# Patient Record
Sex: MALE | Race: WHITE | NOT HISPANIC OR LATINO | Employment: OTHER | URBAN - METROPOLITAN AREA
[De-identification: names, ages, dates, MRNs, and addresses within clinical notes are randomized per-mention and may not be internally consistent; named-entity substitution may affect disease eponyms.]

---

## 2017-08-09 ENCOUNTER — ALLSCRIPTS OFFICE VISIT (OUTPATIENT)
Dept: OTHER | Facility: OTHER | Age: 82
End: 2017-08-09

## 2017-08-09 LAB
BILIRUB UR QL STRIP: NEGATIVE
CLARITY UR: NORMAL
COLOR UR: YELLOW
GLUCOSE (HISTORICAL): NEGATIVE
HGB UR QL STRIP.AUTO: NORMAL
KETONES UR STRIP-MCNC: NEGATIVE MG/DL
LEUKOCYTE ESTERASE UR QL STRIP: NORMAL
NITRITE UR QL STRIP: NEGATIVE
PH UR STRIP.AUTO: 7 [PH]
PROT UR STRIP-MCNC: > = 300 MG/DL
SP GR UR STRIP.AUTO: 1.02
UROBILINOGEN UR QL STRIP.AUTO: 0.2

## 2018-01-12 VITALS
HEIGHT: 68 IN | BODY MASS INDEX: 24.86 KG/M2 | SYSTOLIC BLOOD PRESSURE: 144 MMHG | DIASTOLIC BLOOD PRESSURE: 68 MMHG | WEIGHT: 164 LBS

## 2018-05-24 ENCOUNTER — TELEPHONE (OUTPATIENT)
Dept: UROLOGY | Facility: MEDICAL CENTER | Age: 83
End: 2018-05-24

## 2018-05-24 NOTE — TELEPHONE ENCOUNTER
An Auto-fax Refill Request for Cephalexin was received from [facility] mail order pharmacy  Patient was last seen in August, 2017 by Dr Cheri Caputo  At that time, prolonged antibiotic treatment was only expected for several months, not a long-term therapy  Continuation of the medication is NOT APPROVED  [facility] notified via phone; I spoke with Aristeo Alva  Ph

## 2018-08-13 ENCOUNTER — TELEPHONE (OUTPATIENT)
Dept: UROLOGY | Facility: MEDICAL CENTER | Age: 83
End: 2018-08-13

## 2018-08-13 ENCOUNTER — OFFICE VISIT (OUTPATIENT)
Dept: UROLOGY | Facility: MEDICAL CENTER | Age: 83
End: 2018-08-13
Payer: COMMERCIAL

## 2018-08-13 VITALS
DIASTOLIC BLOOD PRESSURE: 78 MMHG | BODY MASS INDEX: 26.2 KG/M2 | HEIGHT: 66 IN | WEIGHT: 163 LBS | SYSTOLIC BLOOD PRESSURE: 126 MMHG

## 2018-08-13 DIAGNOSIS — N52.9 ERECTILE DYSFUNCTION, UNSPECIFIED ERECTILE DYSFUNCTION TYPE: ICD-10-CM

## 2018-08-13 DIAGNOSIS — N39.41 URGE INCONTINENCE OF URINE: Primary | ICD-10-CM

## 2018-08-13 DIAGNOSIS — R35.1 BENIGN PROSTATIC HYPERPLASIA WITH NOCTURIA: ICD-10-CM

## 2018-08-13 DIAGNOSIS — N40.1 BENIGN PROSTATIC HYPERPLASIA WITH NOCTURIA: ICD-10-CM

## 2018-08-13 LAB
SL AMB  POCT GLUCOSE, UA: NEGATIVE
SL AMB LEUKOCYTE ESTERASE,UA: ABNORMAL
SL AMB POCT BILIRUBIN,UA: ABNORMAL
SL AMB POCT BLOOD,UA: ABNORMAL
SL AMB POCT CLARITY,UA: ABNORMAL
SL AMB POCT COLOR,UA: YELLOW
SL AMB POCT KETONES,UA: ABNORMAL
SL AMB POCT NITRITE,UA: NEGATIVE
SL AMB POCT PH,UA: 7
SL AMB POCT SPECIFIC GRAVITY,UA: 1.02
SL AMB POCT URINE PROTEIN: ABNORMAL
SL AMB POCT UROBILINOGEN: 0.2

## 2018-08-13 PROCEDURE — 4040F PNEUMOC VAC/ADMIN/RCVD: CPT | Performed by: UROLOGY

## 2018-08-13 PROCEDURE — 99214 OFFICE O/P EST MOD 30 MIN: CPT | Performed by: UROLOGY

## 2018-08-13 PROCEDURE — 81003 URINALYSIS AUTO W/O SCOPE: CPT | Performed by: UROLOGY

## 2018-08-13 RX ORDER — DULOXETIN HYDROCHLORIDE 60 MG/1
60 CAPSULE, DELAYED RELEASE ORAL DAILY
COMMUNITY
End: 2021-01-01 | Stop reason: ALTCHOICE

## 2018-08-13 RX ORDER — NIACIN 500 MG
TABLET ORAL DAILY
COMMUNITY

## 2018-08-13 RX ORDER — TRAMADOL HYDROCHLORIDE 50 MG/1
TABLET ORAL AS NEEDED
COMMUNITY
Start: 2018-02-27 | End: 2019-08-21

## 2018-08-13 RX ORDER — CALCIUM CARBONATE/VITAMIN D3 500-10/5ML
LIQUID (ML) ORAL DAILY
COMMUNITY
Start: 2014-07-10

## 2018-08-13 RX ORDER — METOPROLOL TARTRATE 50 MG/1
TABLET, FILM COATED ORAL 2 TIMES DAILY
COMMUNITY
Start: 2018-06-13

## 2018-08-13 RX ORDER — CITALOPRAM 40 MG/1
TABLET ORAL DAILY
COMMUNITY
Start: 2018-07-17 | End: 2021-01-01 | Stop reason: ALTCHOICE

## 2018-08-13 RX ORDER — PRAVASTATIN SODIUM 80 MG/1
TABLET ORAL DAILY
COMMUNITY
Start: 2018-07-20

## 2018-08-13 RX ORDER — SILDENAFIL 100 MG/1
100 TABLET, FILM COATED ORAL DAILY PRN
Qty: 3 TABLET | Refills: 0 | Status: SHIPPED | OUTPATIENT
Start: 2018-08-13 | End: 2021-01-01 | Stop reason: ALTCHOICE

## 2018-08-13 RX ORDER — ASPIRIN 325 MG
325 TABLET ORAL DAILY
COMMUNITY
Start: 2008-06-11

## 2018-08-13 RX ORDER — GABAPENTIN 100 MG/1
100 CAPSULE ORAL AS NEEDED
COMMUNITY
Start: 2018-03-05 | End: 2019-08-21

## 2018-08-13 RX ORDER — CEPHALEXIN 500 MG/1
1 CAPSULE ORAL DAILY
COMMUNITY
Start: 2017-08-09 | End: 2018-08-15 | Stop reason: SDUPTHER

## 2018-08-13 RX ORDER — LOSARTAN POTASSIUM 100 MG/1
TABLET ORAL DAILY
COMMUNITY
Start: 2018-07-17

## 2018-08-13 NOTE — PATIENT INSTRUCTIONS
Erectile Dysfunction   WHAT YOU NEED TO KNOW:   What is erectile dysfunction? Erectile dysfunction (ED), or impotence, is when you cannot get or keep an erection for sexual activity  What causes ED? · Conditions that lead to nerve problems, such as a spinal cord injury, diabetes, or stroke    · Hormone imbalances, such as low testosterone, high prolactin, or a thyroid disorder    · Medical conditions that decrease blood flow, such as high blood pressure and arteriosclerosis    · Multiple sclerosis or Parkinson disease    · Medicines, such as those used to treat depression and high blood pressure    · Injury to the testicles from radiation therapy to the testicles  What increases my risk for ED? · Obesity    · Diabetes that is not controlled    · Smoking, or drug or alcohol abuse    · An enlarged prostate    · Increasing age    · Spine or groin surgeries    · Stress, depression, relationship problems, and anxiety  How is ED diagnosed? Your healthcare provider will ask questions about your symptoms  He will also ask about any medical conditions you have and medicines you take  Your healthcare provider will examine your abdomen, penis, and testicles  A rectal exam may also be done to check for an enlarged prostate  Blood and urine tests are done to check for medical conditions that may have caused your ED  You may also need tests to check your blood flow and nerve function  How is ED treated? Treatment depends on the cause of your ED  You may need any of the following:  · ED medicines  help you have an erection  These medicines are taken before you have sex  Follow your healthcare provider's instructions on how to use these medicines  You may have a life-threatening reaction if you mix ED medicines with medicines that contain nitrates  Medicines with nitrates include nitroglycerin and other heart medicines  · Testosterone  may be given to increase the levels in your blood and improve your ED   You may need to use a skin cream or wear a patch  · Penis injections  may be done to help improve your blood flow  · A vacuum device  is a tube that is placed over the penis  A hand pump is connected to the tube and acts as a vacuum  This may help increase blood flow to the penis  · Therapy  may be needed to treat emotional or relationship problems that may be causing your ED  · Surgery  may be recommended if other treatments do not work  Surgery includes a penile implant or prosthesis  Surgery may also be done to improve blood flow  Ask for more information about surgeries for ED  How can I decrease my risk for ED? · Do not smoke  Smoking can cause ED  Nicotine and other chemicals in cigarettes and cigars can also cause lung damage  Ask your healthcare provider for information if you currently smoke and need help to quit  E-cigarettes or smokeless tobacco still contain nicotine  Talk to your healthcare provider before you use these products  · Control your blood sugar levels if you have diabetes  Over time, high blood sugar levels can cause ED  · Limit alcohol  Men should limit alcohol to 2 drinks a day  A drink of alcohol is 12 ounces of beer, 5 ounces of wine, or 1½ ounces of liquor  · Do not use illegal drugs  They increase your risk of ED  · Manage your medical conditions  Eat a variety of healthy foods and stay physically active  Take your medicines as directed  They can help control conditions that may cause ED  · Manage stress  Learn ways to relax, such as deep breathing, meditation, and listening to music  When should I seek immediate care? · You have chest pain, dizziness, or nausea after you take ED medicines or during or after sex  · You have an erection for more than 4 hours after you take your ED medicine  · You see blood in your urine  When should I contact my healthcare provider?    · You have changes in your vision, headaches, or back pain after you take your ED medicine  · You have a painful erection  · You have questions or concerns about your condition or care  CARE AGREEMENT:   You have the right to help plan your care  Learn about your health condition and how it may be treated  Discuss treatment options with your caregivers to decide what care you want to receive  You always have the right to refuse treatment  The above information is an  only  It is not intended as medical advice for individual conditions or treatments  Talk to your doctor, nurse or pharmacist before following any medical regimen to see if it is safe and effective for you  © 2017 [facility] Information is for End User's use only and may not be sold, redistributed or otherwise used for commercial purposes  All illustrations and images included in CareNotes® are the copyrighted property of A D A M , Inc  or Vance Borja

## 2018-08-13 NOTE — PROGRESS NOTES
Assessment/Plan:    Erectile dysfunction   Trial of sildenafil in progress    Benign prostatic hyperplasia with nocturia   The patient is satisfied with the status quo  Urge incontinence of urine    Seldom occurs unless he is infected  Continue Keflex at bedtime  Diagnoses and all orders for this visit:    Urge incontinence of urine  -     POCT urine dip auto non-scope    Benign prostatic hyperplasia with nocturia    Erectile dysfunction, unspecified erectile dysfunction type  -     sildenafil (VIAGRA) 100 mg tablet; Take 1 tablet (100 mg total) by mouth daily as needed for erectile dysfunction    Other orders  -     cephalexin (KEFLEX) 500 mg capsule; Take 1 capsule by mouth daily  -     aspirin 325 mg tablet; Take 325 mg by mouth daily  -     citalopram (CeleXA) 40 mg tablet; daily  -     Omega-3 Fatty Acids (FISH OIL PO); daily  -     DULoxetine (CYMBALTA) 60 mg delayed release capsule; Take 60 mg by mouth daily  -     gabapentin (NEURONTIN) 100 mg capsule; Take 100 mg by mouth daily  -     losartan (COZAAR) 100 MG tablet; daily  -     Magnesium Oxide 400 MG CAPS; daily  -     metoprolol tartrate (LOPRESSOR) 50 mg tablet; 2 (two) times a day  -     pravastatin (PRAVACHOL) 80 mg tablet; daily  -     traMADol (ULTRAM) 50 mg tablet; as needed  -     niacin 500 mg tablet; daily          Subjective:      Patient ID: Stanislaw Zendejas is a 82 y o  male  HPI  BPH:   He notes urinary urgency, nocturia x 2  Able to fal back to sleep  Rosella Goldmann He denies other significant urinary symptoms  He denies gross hematuria, urinary tract infections or incontinence  He is taking neither medications nor supplements for his symptoms  Urge incontinence:    Abx have decreased leakage to a rare event and volumes are small  Does not wear a pad  Pt has been taking Keflex q h s  for a year     The patient is prone to urinary tract infections because of his post -void residual of approximately 150 mL as measured at the time of his cystoscopy 2 years ago  Keflex prescription will be renewed  Erectile dysfunction:  Tried Cialis (dose unknown) and Viagra 50 in the past and neither worked  Will try Viagra 100 mg  The following portions of the patient's history were reviewed and updated as appropriate: allergies, current medications, past family history, past medical history, past social history, past surgical history and problem list     Review of Systems   Constitutional: Negative for activity change and fatigue  Respiratory: Negative for shortness of breath and wheezing  Cardiovascular: Negative for chest pain  Gastrointestinal: Negative for abdominal pain  Genitourinary: Negative for difficulty urinating, dysuria, frequency, hematuria and urgency  Musculoskeletal: Negative for back pain and gait problem  Skin: Negative  Allergic/Immunologic: Negative  Neurological: Negative  Psychiatric/Behavioral: Negative  Objective:      /78 (BP Location: Left arm, Patient Position: Sitting, Cuff Size: Standard)   Ht 5' 6" (1 676 m)   Wt 73 9 kg (163 lb)   BMI 26 31 kg/m²          Physical Exam   Constitutional: He is oriented to person, place, and time  He appears well-developed and well-nourished  HENT:   Head: Normocephalic and atraumatic  Eyes: EOM are normal    Neck: Normal range of motion  Neck supple  Pulmonary/Chest: Effort normal    Genitourinary: Rectum normal    Genitourinary Comments: The prostate is 30 gm, firm, smooth, non-tender  Musculoskeletal: Normal range of motion  Neurological: He is alert and oriented to person, place, and time  Skin: Skin is warm and dry  Psychiatric: He has a normal mood and affect   His behavior is normal  Judgment and thought content normal

## 2018-08-13 NOTE — PROGRESS NOTES
IPSS Questionnaire (AUA-7): Over the past month    1)  How often have you had a sensation of not emptying your bladder completely after you finish urinating? 0 - Not at all   2)  How often have you had to urinate again less than two hours after you finished urinating? 1 - Less than 1 time in 5   3)  How often have you found you stopped and started again several times when you urinated? 0 - Not at all   4) How difficult have you found it to postpone urination? 2 - Less than half the time   5) How often have you had a weak urinary stream?  1 - Less than 1 time in 5   6) How often have you had to push or strain to begin urination? 0 - Not at all   7) How many times did you most typically get up to urinate from the time you went to bed until the time you got up in the morning? 2 - 2 times   Total Score:  6     QOL: Mostly satisfied

## 2018-08-13 NOTE — TELEPHONE ENCOUNTER
Patient left a message on the Medication Refill voice mail line  He was seen by Dr Ruthy Fernandez today and wanted to clarify if he was to continue a prescription for a "flex-something" named drug (Cephalexin) through [facility]  Office note from today's visit was incomplete and did not mention continuation of the medication  However, this very topic was discussed by on 5/24/18 (see note)  At that time, long-term treatment was not felt to be indicated and the medication was discontinued at that time  Wife thought he was to continue the drug  Message forwarded to Dr Ruthy Fernandez

## 2018-08-13 NOTE — LETTER
August 14, 2018     Rosalinaandrew SantosDO  [address]    Patient: Terrance Pepe   YOB: 1935   Date of Visit: 8/13/2018       Dear Dr Goldberg Slider: Thank you for referring Alfa Ibarra to me for evaluation  Below are my notes for this consultation  If you have questions, please do not hesitate to call me  I look forward to following your patient along with you  Sincerely,        Roverto Lester MD        CC: No Recipients  Roverto Lester MD  8/14/2018  2:41 PM  Sign at close encounter  Assessment/Plan:    Erectile dysfunction   Trial of sildenafil in progress    Benign prostatic hyperplasia with nocturia   The patient is satisfied with the status quo  Urge incontinence of urine    Seldom occurs unless he is infected  Continue Keflex at bedtime  Diagnoses and all orders for this visit:    Urge incontinence of urine  -     POCT urine dip auto non-scope    Benign prostatic hyperplasia with nocturia    Erectile dysfunction, unspecified erectile dysfunction type  -     sildenafil (VIAGRA) 100 mg tablet; Take 1 tablet (100 mg total) by mouth daily as needed for erectile dysfunction    Other orders  -     cephalexin (KEFLEX) 500 mg capsule; Take 1 capsule by mouth daily  -     aspirin 325 mg tablet; Take 325 mg by mouth daily  -     citalopram (CeleXA) 40 mg tablet; daily  -     Omega-3 Fatty Acids (FISH OIL PO); daily  -     DULoxetine (CYMBALTA) 60 mg delayed release capsule; Take 60 mg by mouth daily  -     gabapentin (NEURONTIN) 100 mg capsule; Take 100 mg by mouth daily  -     losartan (COZAAR) 100 MG tablet; daily  -     Magnesium Oxide 400 MG CAPS; daily  -     metoprolol tartrate (LOPRESSOR) 50 mg tablet; 2 (two) times a day  -     pravastatin (PRAVACHOL) 80 mg tablet; daily  -     traMADol (ULTRAM) 50 mg tablet; as needed  -     niacin 500 mg tablet; daily          Subjective:      Patient ID: Terrance Pepe is a 82 y o  male     HPI  BPH:   He notes urinary urgency, nocturia x 2  Able to fal back to sleep  Blaise Lam He denies other significant urinary symptoms  He denies gross hematuria, urinary tract infections or incontinence  He is taking neither medications nor supplements for his symptoms  Urge incontinence:    Abx have decreased leakage to a rare event and volumes are small  Does not wear a pad  Pt has been taking Keflex q h s  for a year  The patient is prone to urinary tract infections because of his post -void residual of approximately 150 mL as measured at the time of his cystoscopy 2 years ago  Keflex prescription will be renewed  Erectile dysfunction:  Tried Cialis (dose unknown) and Viagra 50 in the past and neither worked  Will try Viagra 100 mg  The following portions of the patient's history were reviewed and updated as appropriate: allergies, current medications, past family history, past medical history, past social history, past surgical history and problem list     Review of Systems   Constitutional: Negative for activity change and fatigue  Respiratory: Negative for shortness of breath and wheezing  Cardiovascular: Negative for chest pain  Gastrointestinal: Negative for abdominal pain  Genitourinary: Negative for difficulty urinating, dysuria, frequency, hematuria and urgency  Musculoskeletal: Negative for back pain and gait problem  Skin: Negative  Allergic/Immunologic: Negative  Neurological: Negative  Psychiatric/Behavioral: Negative  Objective:      /78 (BP Location: Left arm, Patient Position: Sitting, Cuff Size: Standard)   Ht 5' 6" (1 676 m)   Wt 73 9 kg (163 lb)   BMI 26 31 kg/m²           Physical Exam   Constitutional: He is oriented to person, place, and time  He appears well-developed and well-nourished  HENT:   Head: Normocephalic and atraumatic  Eyes: EOM are normal    Neck: Normal range of motion  Neck supple     Pulmonary/Chest: Effort normal    Genitourinary: Rectum normal    Genitourinary Comments: The prostate is 30 gm, firm, smooth, non-tender  Musculoskeletal: Normal range of motion  Neurological: He is alert and oriented to person, place, and time  Skin: Skin is warm and dry  Psychiatric: He has a normal mood and affect   His behavior is normal  Judgment and thought content normal

## 2018-08-14 PROBLEM — R35.1 BENIGN PROSTATIC HYPERPLASIA WITH NOCTURIA: Status: ACTIVE | Noted: 2018-08-14

## 2018-08-14 PROBLEM — N40.1 BENIGN PROSTATIC HYPERPLASIA WITH NOCTURIA: Status: ACTIVE | Noted: 2018-08-14

## 2018-08-14 PROBLEM — N39.41 URGE INCONTINENCE OF URINE: Status: ACTIVE | Noted: 2018-08-14

## 2018-08-14 PROBLEM — N52.9 ERECTILE DYSFUNCTION: Status: ACTIVE | Noted: 2018-08-14

## 2018-08-15 DIAGNOSIS — N39.0 RECURRENT UTI: ICD-10-CM

## 2018-08-15 DIAGNOSIS — N39.41 URGE INCONTINENCE: Primary | ICD-10-CM

## 2018-08-15 RX ORDER — CEPHALEXIN 500 MG/1
500 CAPSULE ORAL
Qty: 90 CAPSULE | Refills: 3 | Status: SHIPPED | OUTPATIENT
Start: 2018-08-15 | End: 2019-06-20 | Stop reason: SDUPTHER

## 2018-08-15 NOTE — TELEPHONE ENCOUNTER
Per office note dated 8/13/18, YES patient is to continue with QHS dosing of Cephalexin (Keflex) 500mg    Script for same, 90 day supply with 3 refills was queued and forwarded to Dr Faiza Gordillo for approval

## 2018-08-15 NOTE — TELEPHONE ENCOUNTER
Per office note dated 8/13/18 states patient to continue with QHS dosing of Cephalexin (Keflex) 500mg    Script for same, 90 day supply with 3 refills was queued and forwarded to Dr Hayde Simon for approval

## 2019-03-20 ENCOUNTER — TELEPHONE (OUTPATIENT)
Dept: UROLOGY | Facility: MEDICAL CENTER | Age: 84
End: 2019-03-20

## 2019-03-20 NOTE — TELEPHONE ENCOUNTER
Pt called asking to have his urine results in Care Everywhere reviewed states his protein level is elevated

## 2019-03-22 NOTE — TELEPHONE ENCOUNTER
Patient is to follow up with the provider that ordered the testing which would be his internal medicine physician    At that time he will be able to interpret the results with the patient and explain it is meaning to the patient

## 2019-06-20 DIAGNOSIS — N39.41 URGE INCONTINENCE: ICD-10-CM

## 2019-06-22 RX ORDER — CEPHALEXIN 500 MG/1
CAPSULE ORAL
Qty: 90 CAPSULE | Refills: 3 | Status: SHIPPED | OUTPATIENT
Start: 2019-06-22 | End: 2019-09-20

## 2019-08-21 ENCOUNTER — OFFICE VISIT (OUTPATIENT)
Dept: UROLOGY | Facility: MEDICAL CENTER | Age: 84
End: 2019-08-21
Payer: COMMERCIAL

## 2019-08-21 VITALS
SYSTOLIC BLOOD PRESSURE: 138 MMHG | WEIGHT: 162 LBS | BODY MASS INDEX: 24.55 KG/M2 | DIASTOLIC BLOOD PRESSURE: 84 MMHG | HEART RATE: 62 BPM | HEIGHT: 68 IN

## 2019-08-21 DIAGNOSIS — N40.1 BENIGN PROSTATIC HYPERPLASIA WITH NOCTURIA: Primary | ICD-10-CM

## 2019-08-21 DIAGNOSIS — N52.9 ERECTILE DYSFUNCTION, UNSPECIFIED ERECTILE DYSFUNCTION TYPE: ICD-10-CM

## 2019-08-21 DIAGNOSIS — N39.0 RECURRENT URINARY TRACT INFECTION: ICD-10-CM

## 2019-08-21 DIAGNOSIS — R35.1 BENIGN PROSTATIC HYPERPLASIA WITH NOCTURIA: Primary | ICD-10-CM

## 2019-08-21 LAB
SL AMB  POCT GLUCOSE, UA: NEGATIVE
SL AMB LEUKOCYTE ESTERASE,UA: ABNORMAL
SL AMB POCT BILIRUBIN,UA: NEGATIVE
SL AMB POCT BLOOD,UA: NEGATIVE
SL AMB POCT CLARITY,UA: ABNORMAL
SL AMB POCT COLOR,UA: YELLOW
SL AMB POCT KETONES,UA: NEGATIVE
SL AMB POCT NITRITE,UA: NEGATIVE
SL AMB POCT PH,UA: 8
SL AMB POCT SPECIFIC GRAVITY,UA: 1.02
SL AMB POCT URINE PROTEIN: ABNORMAL
SL AMB POCT UROBILINOGEN: 0.2

## 2019-08-21 PROCEDURE — 99214 OFFICE O/P EST MOD 30 MIN: CPT | Performed by: UROLOGY

## 2019-08-21 PROCEDURE — 81003 URINALYSIS AUTO W/O SCOPE: CPT | Performed by: UROLOGY

## 2019-08-21 RX ORDER — TADALAFIL 20 MG/1
20 TABLET ORAL AS NEEDED
Qty: 10 TABLET | Refills: 1 | Status: SHIPPED | OUTPATIENT
Start: 2019-08-21 | End: 2021-01-01 | Stop reason: ALTCHOICE

## 2019-08-21 RX ORDER — METHOCARBAMOL 750 MG/1
TABLET, FILM COATED ORAL
Refills: 0 | COMMUNITY
Start: 2019-08-08 | End: 2020-03-12

## 2019-08-21 RX ORDER — IBUPROFEN 400 MG/1
TABLET ORAL AS NEEDED
Refills: 0 | COMMUNITY
Start: 2019-08-08 | End: 2020-03-12

## 2019-08-21 NOTE — PROGRESS NOTES
Assessment/Plan:    Benign prostatic hyperplasia with nocturia  The patient is content with the status quo  Return in 1 year  Erectile dysfunction  Sildenafil did not work  Trial of tadalafil  Recurrent urinary tract infection  Continue Keflex at bedtime  Diagnoses and all orders for this visit:    Benign prostatic hyperplasia with nocturia  -     POCT urine dip auto non-scope    Erectile dysfunction, unspecified erectile dysfunction type  -     tadalafil (CIALIS) 20 MG tablet; Take 1 tablet (20 mg total) by mouth as needed for erectile dysfunction for up to 1010 doses    Recurrent urinary tract infection    Other orders  -     ibuprofen (MOTRIN) 400 mg tablet; as needed  -     methocarbamol (ROBAXIN) 750 mg tablet; take 1 tablets by mouth three times a day if needed FOR MUSCLE SPASMS          Subjective:      Patient ID: Michaela Calabrese is a 82 y o  male  HPI   Recurrent UTI:  Cephalexin at bedtime has kept him from getting infected  BPH:  He notes urinary urgency and weak stream   He denies other significant urinary symptoms  He denies gross hematuria, urinary tract infections or incontinence  He is taking neither medications nor supplements for his symptoms  PSA:  No longer monitored due to the patient's age  AUA SYMPTOM SCORE      Most Recent Value   AUA SYMPTOM SCORE   How often have you had a sensation of not emptying your bladder completely after you finished urinating? 1   How often have you had to urinate again less than two hours after you finished urinating? 1   How often have you found you stopped and started again several times when you urinate? 1   How often have you found it difficult to postpone urination? 3   How often have you had a weak urinary stream?  2   How often have you had to push or strain to begin urination? 0   How many times did you most typically get up to urinate from the time you went to bed at night until the time you got up in the morning?   1 Quality of Life: If you were to spend the rest of your life with your urinary condition just the way it is now, how would you feel about that?  3   AUA SYMPTOM SCORE  9        Erectile dysfunction:  Sildenafil 100 did not work  Will try tadalafil 20 mg  The following portions of the patient's history were reviewed and updated as appropriate: allergies, current medications, past family history, past medical history, past social history, past surgical history and problem list     Review of Systems   Constitutional: Negative for activity change and fatigue  Respiratory: Negative for shortness of breath and wheezing  Cardiovascular: Negative for chest pain  Hypertension  Gastrointestinal: Negative for abdominal pain  Genitourinary: Negative for difficulty urinating, dysuria, frequency, hematuria and urgency  Musculoskeletal: Negative for back pain and gait problem  Skin: Negative  Allergic/Immunologic: Negative  Neurological: Negative  Psychiatric/Behavioral: Negative  Taking duloxetine  Objective:      /84 (BP Location: Right arm, Patient Position: Sitting, Cuff Size: Standard)   Pulse 62   Ht 5' 8" (1 727 m)   Wt 73 5 kg (162 lb)   BMI 24 63 kg/m²          Physical Exam   Constitutional: He is oriented to person, place, and time  He appears well-developed and well-nourished  HENT:   Head: Normocephalic and atraumatic  Eyes: EOM are normal    Neck: Normal range of motion  Neck supple  Pulmonary/Chest: Effort normal    Genitourinary: Rectum normal    Genitourinary Comments: The prostate is 20 gm, firm, smooth, non-tender  Musculoskeletal: Normal range of motion  Neurological: He is alert and oriented to person, place, and time  Skin: Skin is warm and dry  Psychiatric: He has a normal mood and affect   His behavior is normal  Judgment and thought content normal

## 2019-08-22 ENCOUNTER — TELEPHONE (OUTPATIENT)
Dept: UROLOGY | Facility: MEDICAL CENTER | Age: 84
End: 2019-08-22

## 2019-08-22 NOTE — TELEPHONE ENCOUNTER
Patient of Dr Tatiana Leos seen in the [facility] office  Patient is requesting an after visit summary for his visit on 8/21/19 be mailed to his home address

## 2019-11-15 ENCOUNTER — TELEPHONE (OUTPATIENT)
Dept: NEUROLOGY | Facility: CLINIC | Age: 84
End: 2019-11-15

## 2019-11-15 NOTE — TELEPHONE ENCOUNTER
Best contact number for patient:[phone]    Emergency Contact name and number:    Referring provider:Rishi Guerra    Referring provider Telephone:________________    Primary Care Provider Name: Rudi Guerrero    Reason for Appointment/Dx: [place] of left lower extremity    Neurology Location patient would like to be seen:    Order received? Yes                                                Records Received? No    Have you ever seen another Neurologist? No    Insurance Information    Insurance Name:[employer] Medicare    ID/Policy #:    Secondary Insurance:    ID/Policy#: Workman's Comp/ Accident/ School  Information      Workman's Comp/Accident/School related?  No    If yes name of Insurance company:    Date of Injury:    Open Claim:No    [address] Name and Telephone Number:    Notes:Piedad Medina new pt pack sent                   Appointment date:05/04/20 3:00pm _____________________________

## 2019-12-18 ENCOUNTER — TELEPHONE (OUTPATIENT)
Dept: NEUROLOGY | Facility: CLINIC | Age: 84
End: 2019-12-18

## 2019-12-18 NOTE — TELEPHONE ENCOUNTER
LMOM letting patient know we are trying to schedule patient an appointment for him to come in and see Dr Guerita Ortiz had asked if it was possible for them to get an appointment  We have appointments available in April but once we schedule an appointment if their is any cancellations we will give them a call for a sooner appointment  Let them know to call and ask for Maximus Dumas

## 2019-12-19 NOTE — TELEPHONE ENCOUNTER
Patient's wife returning call  Please call patient back  She said she is going to be there until 10am, and after that she will be out

## 2020-01-01 ENCOUNTER — TELEPHONE (OUTPATIENT)
Dept: PAIN MEDICINE | Facility: CLINIC | Age: 85
End: 2020-01-01

## 2020-01-01 ENCOUNTER — TELEPHONE (OUTPATIENT)
Dept: UROLOGY | Facility: AMBULATORY SURGERY CENTER | Age: 85
End: 2020-01-01

## 2020-01-01 ENCOUNTER — TELEPHONE (OUTPATIENT)
Dept: NEUROSURGERY | Facility: CLINIC | Age: 85
End: 2020-01-01

## 2020-01-01 ENCOUNTER — TELEMEDICINE (OUTPATIENT)
Dept: PAIN MEDICINE | Facility: CLINIC | Age: 85
End: 2020-01-01
Payer: COMMERCIAL

## 2020-01-01 ENCOUNTER — TELEPHONE (OUTPATIENT)
Dept: UROLOGY | Facility: MEDICAL CENTER | Age: 85
End: 2020-01-01

## 2020-01-01 ENCOUNTER — OFFICE VISIT (OUTPATIENT)
Dept: NEUROSURGERY | Facility: CLINIC | Age: 85
End: 2020-01-01
Payer: COMMERCIAL

## 2020-01-01 VITALS
HEIGHT: 68 IN | SYSTOLIC BLOOD PRESSURE: 143 MMHG | TEMPERATURE: 97.1 F | BODY MASS INDEX: 23.64 KG/M2 | HEART RATE: 61 BPM | RESPIRATION RATE: 16 BRPM | WEIGHT: 156 LBS | DIASTOLIC BLOOD PRESSURE: 72 MMHG

## 2020-01-01 DIAGNOSIS — G89.29 CHRONIC BILATERAL LOW BACK PAIN WITHOUT SCIATICA: ICD-10-CM

## 2020-01-01 DIAGNOSIS — M48.062 SPINAL STENOSIS OF LUMBAR REGION WITH NEUROGENIC CLAUDICATION: ICD-10-CM

## 2020-01-01 DIAGNOSIS — M51.26 HERNIATED NUCLEUS PULPOSUS, L4-5: ICD-10-CM

## 2020-01-01 DIAGNOSIS — M96.1 LUMBAR POST-LAMINECTOMY SYNDROME: ICD-10-CM

## 2020-01-01 DIAGNOSIS — R26.1 SPASTIC GAIT: ICD-10-CM

## 2020-01-01 DIAGNOSIS — M54.50 CHRONIC BILATERAL LOW BACK PAIN WITHOUT SCIATICA: ICD-10-CM

## 2020-01-01 DIAGNOSIS — G89.4 CHRONIC PAIN SYNDROME: Primary | ICD-10-CM

## 2020-01-01 DIAGNOSIS — M48.02 CERVICAL SPINAL STENOSIS: Primary | ICD-10-CM

## 2020-01-01 DIAGNOSIS — N39.0 URINARY TRACT INFECTION WITHOUT HEMATURIA, SITE UNSPECIFIED: Primary | ICD-10-CM

## 2020-01-01 DIAGNOSIS — M54.16 LUMBAR RADICULITIS: ICD-10-CM

## 2020-01-01 PROCEDURE — 99443 PR PHYS/QHP TELEPHONE EVALUATION 21-30 MIN: CPT | Performed by: NURSE PRACTITIONER

## 2020-01-01 PROCEDURE — 99213 OFFICE O/P EST LOW 20 MIN: CPT | Performed by: NEUROLOGICAL SURGERY

## 2020-01-01 RX ORDER — NITROFURANTOIN 25; 75 MG/1; MG/1
100 CAPSULE ORAL 2 TIMES DAILY
Qty: 10 CAPSULE | Refills: 0 | Status: SHIPPED | OUTPATIENT
Start: 2020-01-01 | End: 2020-01-01

## 2020-01-01 RX ORDER — NITROFURANTOIN 25; 75 MG/1; MG/1
100 CAPSULE ORAL 2 TIMES DAILY
Qty: 10 CAPSULE | Refills: 0 | Status: SHIPPED | OUTPATIENT
Start: 2020-01-01 | End: 2021-01-01

## 2020-02-06 ENCOUNTER — TELEPHONE (OUTPATIENT)
Dept: NEUROLOGY | Facility: CLINIC | Age: 85
End: 2020-02-06

## 2020-02-06 NOTE — TELEPHONE ENCOUNTER
Ps with Zeina Heads rescheduled appt for 02/17/20 with Janeth Medina at [facility]

## 2020-02-17 ENCOUNTER — OFFICE VISIT (OUTPATIENT)
Dept: NEUROLOGY | Facility: CLINIC | Age: 85
End: 2020-02-17
Payer: COMMERCIAL

## 2020-02-17 VITALS
BODY MASS INDEX: 25.85 KG/M2 | WEIGHT: 170.6 LBS | HEART RATE: 59 BPM | DIASTOLIC BLOOD PRESSURE: 63 MMHG | HEIGHT: 68 IN | SYSTOLIC BLOOD PRESSURE: 140 MMHG

## 2020-02-17 DIAGNOSIS — M48.02 CERVICAL STENOSIS OF SPINAL CANAL: Primary | ICD-10-CM

## 2020-02-17 DIAGNOSIS — M48.062 LUMBAR STENOSIS WITH NEUROGENIC CLAUDICATION: ICD-10-CM

## 2020-02-17 PROCEDURE — 99204 OFFICE O/P NEW MOD 45 MIN: CPT | Performed by: PHYSICAL MEDICINE & REHABILITATION

## 2020-02-17 RX ORDER — TRAMADOL HYDROCHLORIDE 50 MG/1
50 TABLET ORAL
COMMUNITY
End: 2020-03-12

## 2020-02-17 RX ORDER — SOLIFENACIN SUCCINATE 10 MG/1
10 TABLET, FILM COATED ORAL DAILY
COMMUNITY
End: 2020-03-12

## 2020-02-17 NOTE — PROGRESS NOTES
Physical Medicine & Rehabilitation New Patient Evaluation  Estefani Martinez 82 y o  male      ASSESSMENT/PLAN:   Gait dysfunction   - history of lumbar and cervical spine surgeries, last MRI approximately 7 years ago  - history consistent with spinal stenosis, will check cervical and lumbar MRIs   - possible cerebellar component, history of alcoholic cirrhosis, but quit drinking 7 years ago   - consider checking B12/folate levels  - recommend follow up with neurology movement disorder specialist if MRI lumbar/cervical spine unchanged from prior (has appointment with neurology, will attempt to move to sooner date)  - PT referral for gait training, core/pelvic stabilization  - no significant dorsiflexion weakness noted LLE, advised patient that he does not need left AFO     No radicular symptoms or isolated motor weakness on exam, will defer EMG/NCS         *I have spent 60 minutes with Patient and family today in which greater than 50% of this time was spent in counseling/coordination of care regarding Prognosis, Risks and benefits of tx options, Intructions for management, Risk factor reductions and Impressions  SUBJECTIVE:  Patient presents today in the office with chief complaint of gait dysfunction    HPI:   Estefani Martinez 82 y o  male, who  has a past medical history of Acute cystitis without hematuria, Atherosclerotic heart disease, Erectile dysfunction, Flaccid neuropathic bladder, not elsewhere classified, Hypercholesteremia, Hypertension, Muscle spasms of neck, Urge incontinence of urine, Urinary retention, and UTI (urinary tract infection)  Old records were reviewed personally  He has history of C5-C6 spinal stenosis, L3-L4 stenosis  He had cervical decompression in 2008  He has history of lumbar laminectomies as well  He reports low back pain 10 years ago that was treated with epidural injections  At the time, he had radiating pain to the LLE  He reports good relief with epidurals for 2 years   He denies low back pain at rest, exacerbated with standing or walking  He reports chronic imbalance, gait dysfunction even before his cervical spine surgery  He reports worsening of gait in the past few months  He tried seeing PT approximately 3 months ago but stopped after 1 appointment as he felt it was ineffective  Imaging:   XR c-spine 9/12/2019 (per [facility] records)  5 erect projections of the cervical spine were obtained  There is no fracture  There is anterior screw fixation extending from C3-C6  There are no previous  studies  There is extensive carotid artery calcification  There is minimal  impingement upon the neuroforamina at C4/C5 on the left  There are degenerative  changes of the apophyseal joints of C2/C3 bilaterally  There is loss of the  normal cervical lordosis at the level of C5/C6  C5 is slightly angulated  compared to C6  MRI lumbar spine 2013 (per [facility] records)  1  Since the prior MRI of 2007, the patient has undergone right   lateral L5 laminectomies  The central canal stenosis at L4-5 has   decreased  2  Mild anterolisthesis of L4, increased since the prior study  No   change in the bilateral L4-5 facet arthropathy  Increased neural   foraminal narrowing at L4-5 with compression of the exiting L4 nerve   roots  3  Congenital central canal stenosis  MRI c-spine 2013 (per [facility] records)  1  Increased bulging of the C7-T1 disc  Mild central canal stenosis   at C7-T1  These are new findings  2  Mild central canal stenosis at C6-7  Neural foraminal narrowing on   the left at C2-3 and C7-T1  and bilaterally from C3-4 through C6-7  These findings are unchanged  3  Status post anterior fusion from C3-C6  ROS:  No fever, chills, chest pain, SOB, cough, nausea, vomiting, diarrhea, constipation, abdominal pain, dysuria, bowel/bladder incontinence, new weakness or changes in sensation   Complete ROS obtained and otherwise negative       OBJECTIVE:   /63 (BP Location: Left arm, Patient Position: Sitting, Cuff Size: Adult)   Pulse 59   Ht 5' 8" (1 727 m)   Wt 77 4 kg (170 lb 9 6 oz)   BMI 25 94 kg/m²      GEN: NAD, sitting comfortably in chair  Head: NCAT, no gross lesions  Eyes: PERRL, EOMI  Throat: clear, no thrush, MMM  Pulm: CTAB, no rales/wheezes  CV: RRR, normal s1/s2  Abd: soft, NTND  Ext: no pedal edema bilaterally, distal extremities warm and well perfused  Psych: normal affect, no agitation  Skin: no observable rashes  Neuro:  A+Ox3, fluent speech, follows commands  Negative ariza/babinski bilateral  2+ patellar and achilles reflex   Sensation intact light touch bilateral LE  Proprioception intact bilateral hallux  Able to perform finger to nose bilateral, heel to shin bilateral    Gait:   Unable to perform tandem gait  Short stride length, wide based gait, shuffling steps   No rigidity on exam     Motor Exam:   Right Left Site Right Left Site   5 5 S Ab:  Shoulder Abductors 5 5 HF:  Hip Flexors   5 5 EF:  Elbow Flexors 5 5 KE:  Knee Extensors   5 5 EE:  Elbow Extensors 5 5 DR:  Dorsi Flexors   5 5 WE:  Wrist Extensors 5 5 EHL: Ext Mcdonald Longus   5 5 FF:  Finger Flexors 5 5 PF:  Plantar Flexors   5 5 HI:  Hand Intrinsics          Labs:   No results found for: WBC, HGB, HCT, MCV, PLT  No results found for: GLUCOSE, CALCIUM, NA, K, CO2, CL, BUN, CREATININE      Past Medical History:   Diagnosis Date    Acute cystitis without hematuria     Atherosclerotic heart disease     Erectile dysfunction     Flaccid neuropathic bladder, not elsewhere classified     Hypercholesteremia     Hypertension     Muscle spasms of neck     Urge incontinence of urine     Urinary retention     UTI (urinary tract infection)        Patient Active Problem List    Diagnosis Date Noted    Recurrent urinary tract infection 08/21/2019    Erectile dysfunction 08/14/2018    Urge incontinence of urine 08/14/2018    Benign prostatic hyperplasia with nocturia 08/14/2018       Past Surgical History:   Procedure Laterality Date    CERVICAL SPINE SURGERY  2010    CYSTOSCOPY  02/08/2017    LUMBAR SPINE SURGERY  2006       Family History   Problem Relation Age of Onset    Cancer Mother     Heart disease Father        Social History    Prior alcohol use (quit 7 years ago)      Allergies   Allergen Reactions    Ace Inhibitors Cough     Cough    Magnesium Chloride Diarrhea    Oxycodone-Acetaminophen Diarrhea         Current Outpatient Medications:     aspirin 325 mg tablet, Take 325 mg by mouth daily, Disp: , Rfl:     citalopram (CeleXA) 40 mg tablet, daily, Disp: , Rfl:     DULoxetine (CYMBALTA) 60 mg delayed release capsule, Take 60 mg by mouth daily, Disp: , Rfl:     losartan (COZAAR) 100 MG tablet, daily, Disp: , Rfl:     Magnesium Oxide 400 MG CAPS, daily, Disp: , Rfl:     metoprolol tartrate (LOPRESSOR) 50 mg tablet, 2 (two) times a day, Disp: , Rfl:     niacin 500 mg tablet, daily, Disp: , Rfl:     Omega-3 Fatty Acids (FISH OIL PO), daily, Disp: , Rfl:     pravastatin (PRAVACHOL) 80 mg tablet, daily, Disp: , Rfl:     solifenacin (VESICARE) 10 MG tablet, Take 10 mg by mouth daily, Disp: , Rfl:     traMADol (ULTRAM) 50 mg tablet, Take 50 mg by mouth daily at bedtime, Disp: , Rfl:     ibuprofen (MOTRIN) 400 mg tablet, as needed, Disp: , Rfl: 0    methocarbamol (ROBAXIN) 750 mg tablet, take 1 tablets by mouth three times a day if needed FOR MUSCLE SPASMS, Disp: , Rfl: 0    sildenafil (VIAGRA) 100 mg tablet, Take 1 tablet (100 mg total) by mouth daily as needed for erectile dysfunction (Patient not taking: Reported on 2/17/2020), Disp: 3 tablet, Rfl: 0    tadalafil (CIALIS) 20 MG tablet, Take 1 tablet (20 mg total) by mouth as needed for erectile dysfunction for up to 1010 doses (Patient not taking: Reported on 2/17/2020), Disp: 10 tablet, Rfl: 1

## 2020-03-02 ENCOUNTER — HOSPITAL ENCOUNTER (OUTPATIENT)
Dept: RADIOLOGY | Facility: HOSPITAL | Age: 85
Discharge: HOME | End: 2020-03-02
Attending: PHYSICAL MEDICINE & REHABILITATION
Payer: COMMERCIAL

## 2020-03-02 DIAGNOSIS — M48.02 CERVICAL STENOSIS OF SPINAL CANAL: ICD-10-CM

## 2020-03-02 DIAGNOSIS — M48.062 LUMBAR STENOSIS WITH NEUROGENIC CLAUDICATION: ICD-10-CM

## 2020-03-02 PROCEDURE — 72148 MRI LUMBAR SPINE W/O DYE: CPT

## 2020-03-02 PROCEDURE — 72141 MRI NECK SPINE W/O DYE: CPT

## 2020-03-04 ENCOUNTER — TELEPHONE (OUTPATIENT)
Dept: NEUROLOGY | Facility: CLINIC | Age: 85
End: 2020-03-04

## 2020-03-04 DIAGNOSIS — M48.02 CERVICAL STENOSIS OF SPINAL CANAL: Primary | ICD-10-CM

## 2020-03-04 NOTE — TELEPHONE ENCOUNTER
Wife called regarding the same  Provided the contact number for neurosurgery, she will call to schedule referral appt

## 2020-03-04 NOTE — TELEPHONE ENCOUNTER
----- Message from Airam Burgos MD sent at 3/4/2020  8:55 AM EST -----  Regarding: MRI results   I called and left voicemail today  Can you call again and let them know I placed a referral to neurosurgery for cervical stenosis?

## 2020-03-04 NOTE — TELEPHONE ENCOUNTER
Patient returned call  Informed him of results and recommendations  Verbalized understanding  Transferred to Neurosurgery to schedule appt

## 2020-03-04 NOTE — TELEPHONE ENCOUNTER
Burak Tobias from [facility] ([facility] radiology called to report immediate findings on MRI cervical spine  Results in epic    Tiger text Dr Clem Jamison

## 2020-03-09 ENCOUNTER — TELEPHONE (OUTPATIENT)
Dept: NEUROLOGY | Facility: CLINIC | Age: 85
End: 2020-03-09

## 2020-03-09 NOTE — TELEPHONE ENCOUNTER
Dr Amaya Earl,    Patient states you gave him a call early Friday morning and he is returning your call  I do not see a telephone encounter but I did see a patient message via my chart   Please advise    Patient phone: [phone]

## 2020-03-09 NOTE — PROGRESS NOTES
DEPARTMENT OF NEUROLOGICAL SCIENCES  [address] and [facility]        NEW PATIENT EVALUATION NOTE    Patient: [address] [facility] Record Number: # [id number]  YOB: 1935  Date of visit: 3/10/2020    Referring provider: No ref  provider found    ASSESSMENT     Diagnoses for this encounter:  1  Cervical spinal stenosis     2  Lumbar radiculitis     3  Spastic gait       Impression of this 83 yo gentleman with known hx of cervical and lumbar stenosis s/p laminectomy and instrumentation, presenting for asymmetric lower extremity weakness  His examination was significant for an altered, stiff gait with some circumduction of the L leg and inability to balance without his walking stick  He did not have any shuffling gait  Also, he was hyperreflexic, no muscle atrophy noted  Recent neck and back imaging has revealed return of stenosis in high and mid cervical cord  The lumbar foraminal stenosis may contribute to his gait abnormalities but he denies any radiating pain  We will await Neurosurgical opinion and Neurology will likely assist in conservative management  He denies any ongoing pain however  PLAN     · Follow Neurosurgery opinion on the treatment of his new cervical stenosis  · No recent or relevant laboratory results in last 3 months to review in Epic  · Relevant neuroimaging results reviewed as per HPI  · Obtain EMG/NCS if he does show additional signs of progressive weakness beyond presently seen  · Less need for MRI Brain at this time given focal findings on exam can likely be explained by C-spine, L-spine pathology  · Thank you very much for sending me this interesting patient  · The patient has been instructed to call us about any new neurological problems or medication side effects  · Return to Clinic in 3 months   If his weakness improves after surgical intervention then it would be even more evident that weakness was secondary to structural cord lesion and less likely neurodegenerative  A total of 60 minutes were spent face-to-face with this patient, of which 25% was spent on counseling and coordination of care  We discussed the natural history of the patient's condition, differential diagnosis, level of diagnostic certainty, treatment alternatives and their side effects and possible complications  HISTORY OF PRESENT ILLNESS:     Mr Elder Warren is a 82 y o  right handed male with past hx of acute cystitis without hematuria, Atherosclerotic heart disease, Erectile dysfunction, Flaccid neuropathic bladder, not elsewhere classified, Hypercholesteremia, Hypertension, Muscle spasms of neck, Urge incontinence of urine, Urinary retention, and UTI (urinary tract infection) who has been referred to the [facility] [address] [place] [address] for evaluation of left lower leg weakness in setting of chronic back pain and spinal stenosis  The patient was accompanied today  History was obtained from patient and spouse  Referred from Dr Daisha Villareal    Per records, he has seen Dr Israel Neves on 2/1/7/20 recently for evaluation of the indication, originally referred from PCP  The following is taken from earlier notes and confirmed by patient:     He has history of C5-C6 spinal stenosis, L3-L4 stenosis  He had cervical decompression in 2008  He has history of lumbar laminectomies as well  He has a 10+ year history of low back pain treated with epidural injections  At the time, he had radiating pain to the LLE  He reported good relief with epidurals for 2 years  He denies low back pain at rest, but they are exacerbated with standing or walking  He reports chronic imbalance, gait dysfunction even before his cervical spine surgery  He reports worsening of gait in the past few months  He tried seeing PT approximately 3 months ago but stopped after 1 appointment as he felt it was ineffective       Imaging:   - XR c-spine 9/12/2019 (per [facility]): No fracture  There is anterior screw fixation extending from C3-C6  There are no previous studies  There is extensive carotid artery calcification  There is minimal impingement upon the neuroforamina at C4/C5 on the left  There are degenerative changes of the apophyseal joints of C2/C3 bilaterally  There is loss of the normal cervical lordosis at the level of C5/C6  C5 is slightly angulated compared to C6   - MRI lumbar spine 2013 (per [facility] records)  1  Since the prior MRI of 2007, the patient has undergone right lateral L5 laminectomies  The central canal stenosis at L4-5 has  decreased  2  Mild anterolisthesis of L4, increased since the prior study  No change in the bilateral L4-5 facet arthropathy  Increased neural  foraminal narrowing at L4-5 with compression of the exiting L4 nerve roots  3  Congenital central canal stenosis  - MRI Cervical spine 2013 (per [facility] records):   1  Increased bulging of the C7-T1 disc  Mild central canal stenosis at C7-T1  These are new findings  2  Mild central canal stenosis at C6-7  Neural foraminal narrowing on the left at C2-3 and C7-T1  and bilaterally from C3-4 through C6-7  These findings are unchanged  3  Status post anterior fusion from C3-C6   - MRI Lumbar Spine 3/2/2020:  Degenerative L4-L5, moderate severe bilateral foraminal stenosis and severe bilateral lateral recess stenosis at risk for L5 radiculitis  4 5cm aortic aneurysm    - MRI Cervical Spine 3/2/2020:  Cord compression at C2-C3 without corresponding signal changes, nonspecific narrowing beyond that  EMG/NCS was deferred given lack of findings on physical examination  He does have Neurosurgery appointment scheduled 3/17/20       REVIEW OF PAST MEDICAL, SOCIAL AND FAMILY HISTORY:  This is the list of problems as per our Medical Records:    Patient Active Problem List    Diagnosis Date Noted    Recurrent urinary tract infection 08/21/2019    Erectile dysfunction 08/14/2018    Urge incontinence of urine 08/14/2018  Benign prostatic hyperplasia with nocturia 08/14/2018     Past Medical History:   Diagnosis Date    Acute cystitis without hematuria     Atherosclerotic heart disease     Erectile dysfunction     Flaccid neuropathic bladder, not elsewhere classified     Hypercholesteremia     Hypertension     Muscle spasms of neck     Urge incontinence of urine     Urinary retention     UTI (urinary tract infection)       Past Surgical History:   Procedure Laterality Date    CERVICAL SPINE SURGERY  2010    CYSTOSCOPY  02/08/2017    LUMBAR SPINE SURGERY  2006      Allergies   Allergen Reactions    Ace Inhibitors Cough     Cough    Magnesium Chloride Diarrhea    Oxycodone-Acetaminophen Diarrhea      Outpatient Encounter Medications as of 3/10/2020   Medication Sig Dispense Refill    aspirin 325 mg tablet Take 325 mg by mouth daily      citalopram (CeleXA) 40 mg tablet daily      DULoxetine (CYMBALTA) 60 mg delayed release capsule Take 60 mg by mouth daily      ibuprofen (MOTRIN) 400 mg tablet as needed  0    losartan (COZAAR) 100 MG tablet daily      Magnesium Oxide 400 MG CAPS daily      methocarbamol (ROBAXIN) 750 mg tablet take 1 tablets by mouth three times a day if needed FOR MUSCLE SPASMS  0    metoprolol tartrate (LOPRESSOR) 50 mg tablet 2 (two) times a day      niacin 500 mg tablet daily      Omega-3 Fatty Acids (FISH OIL PO) daily      pravastatin (PRAVACHOL) 80 mg tablet daily      solifenacin (VESICARE) 10 MG tablet Take 10 mg by mouth daily      sildenafil (VIAGRA) 100 mg tablet Take 1 tablet (100 mg total) by mouth daily as needed for erectile dysfunction (Patient not taking: Reported on 2/17/2020) 3 tablet 0    tadalafil (CIALIS) 20 MG tablet Take 1 tablet (20 mg total) by mouth as needed for erectile dysfunction for up to 1010 doses (Patient not taking: Reported on 2/17/2020) 10 tablet 1    traMADol (ULTRAM) 50 mg tablet Take 50 mg by mouth daily at bedtime       No facility-administered encounter medications on file as of 3/10/2020  Social History     Tobacco Use    Smoking status: Never Smoker    Smokeless tobacco: Never Used   Substance Use Topics    Alcohol use: No      Family History   Problem Relation Age of Onset    Cancer Mother     Heart disease Father         REVIEW OF SYSTEMS:  The patient has entered data on an intake form regarding present illness, past medical and surgical history, medications, allergies, family and social history, and a full review of 14 systems  I have reviewed this form with the patient, and all the relevant information has been included on this note  The full review of systems was negative except as stated in HPI and below  Constitutional: Positive for fatigue  Negative for appetite change and fever  HENT: Negative  Negative for hearing loss, tinnitus, trouble swallowing and voice change  Eyes: Negative  Negative for photophobia and pain  Respiratory: Negative  Negative for shortness of breath  Cardiovascular: Negative  Negative for palpitations  Gastrointestinal: Negative  Negative for nausea and vomiting  Endocrine: Negative  Negative for cold intolerance and heat intolerance  Genitourinary: Negative  Negative for dysuria, frequency and urgency  Musculoskeletal: Positive for gait problem  Negative for myalgias and neck pain  Skin: Negative  Negative for rash  Neurological: Positive for numbness  Negative for dizziness, tremors, seizures, syncope, facial asymmetry, speech difficulty, weakness, light-headedness and headaches  Hematological: Negative  Does not bruise/bleed easily  Psychiatric/Behavioral: Negative  Negative for confusion, hallucinations and sleep disturbance       PHYSICAL EXAMINATION:     Vital signs:  BP (!) 171/79 (BP Location: Right arm, Patient Position: Sitting, Cuff Size: Adult)   Pulse 79   Ht 5' 8" (1 727 m)   Wt 78 5 kg (173 lb)   BMI 26 30 kg/m²     General: Well-appearing, well nourished, pleasant patient in no acute distress  Mood appropriate  Head:  Normocephalic, atraumatic  Oropharynx and conjunctiva are clear  Speech  No hypophonia, no bradylalia  No scanning speech  Language: Comprehension intact  Neck:  Supple, strong 5/5 forward flexion and retroflexion  Extremities: Range of motion is normal       Cognitive and Mental Exam:  The patient is alert, oriented to self, location, date and situation  Memory is normal to provide accurate details of health history    Cranial Nerves:  CN II:  Direct and consensual light reflexes were equally reactive to light symmetrically  No afferent pupillary defect   Visual fields are full to confrontation  CN III / IV / VI: Extraocular movements were full, with normal pursuit and saccades  CN V:   Facial sensation to light touch was intact  CN VII: Face is symmetric with normal strength  CN VIII: Hearing was assessed using the Calibrated Finger Rub Auditory Screening Test (CALFRAST) and was not abnormal (Better than CALFRAST-Strong-70)  CN X:   Palate is up going bilaterally and symmetrically  CN XI:  Neck muscles are strong  CN XII: Tongue protrusion is at midline with normal movements  No dysarthria  Motor:    Dystonia: hammertoe on the 2nd toes of feet, overriding toes  No flexed toes otherwise  Jeannetta Given Dyskinesia: none  Myoclonus: none  Chorea: none  Tics: none       [id number]                Time since last dose:   3/10/20      Speech  0     Facial Expression  0    Postural Tremor (Right) 0    Postural Tremor (Left) 0    Kinetic Tremor (Right)  0    Kinetic Tremor (Left)  0    Rest tremor amplitude RUE 0    Rest tremor amplitude LUE 0    Rest tremor amplitude RLE 0    Rest tremor amplitude LLE 0    Lip/Jaw Tremor  0    Consistency of tremor 0    Finger Taps (Right)   0    Finger Taps (Left)  0    Hand Movement (Right)  0    Hand Movement (Left)   0    Pronation/Supination (Right)  0 Pronation/Supination (Left)   0    Toe Tapping (Right) 1    Toe Tapping (Left) 1    Leg Agility (Right)  0    Leg Agility (Left)   0     Rigidity - Neck  2     Rigidity - Upper Extremity (Right)  0      Rigidity - Upper Extremity (Left)   0     Rigidity - Lower Extremity (Right)  0    Rigidity - Lower Extremity (Left)   0    Arising from Chair   1      Gait   2     Freezing of Gait 0     Postural Stability  -     Posture 0     Global spontaneity of movement 1     No clear rigidity, no clear spasticity    -------------------------------------------------------------------------------------    Muscle Strength Right Left  Muscle Strength Right Left   Deltoid 5/5 5/5  Hip Adductors 5/5 5/5   Biceps 5/5 5/5  Hip Abductors 5/5 5/5   Triceps 5/5 5/5  Knee Extensors 5/5 5/5   Wrist Extensors 5/5 5/5  Knee Flexors 5/5 5/5   Wrist Flexors 5/5 5/5  Ankle Extensors 5/5 5/5    5/5 5/5  Ankle Flexors 5/5 5/5   Finger Abductors 5/5 5/5       Hip Flexors 5/5 5/5   Hip Extensors 5/5 5/5   No clear spasticity on muscle group testing of limbs    Coordination:  Finger-to-nose-finger: normal except for intention tremor on the left hand  Gait:  Stands up normally, uses walking stick, has shortened R leg strides and letting the L leg swing more  Slightly scissoring  He cannot initiate Tandem gait at all  Reflexes:    Right Left   Biceps 2/4 3/4   Brachioradialis 2/4 2/4   Triceps 2/4 2/4   Knee 3/4 3/4   Ankle 2/4 2/4   Positive Mclean's on the R hand  Hyperreflexic in both knees, but without clonus        Plantar cutaneous reflex:  Right: upgoing  Left: upgoing

## 2020-03-10 ENCOUNTER — OFFICE VISIT (OUTPATIENT)
Dept: NEUROLOGY | Facility: CLINIC | Age: 85
End: 2020-03-10
Payer: COMMERCIAL

## 2020-03-10 VITALS
HEART RATE: 79 BPM | HEIGHT: 68 IN | BODY MASS INDEX: 26.22 KG/M2 | DIASTOLIC BLOOD PRESSURE: 79 MMHG | SYSTOLIC BLOOD PRESSURE: 171 MMHG | WEIGHT: 173 LBS

## 2020-03-10 DIAGNOSIS — M48.02 CERVICAL SPINAL STENOSIS: Primary | ICD-10-CM

## 2020-03-10 DIAGNOSIS — M54.16 LUMBAR RADICULITIS: ICD-10-CM

## 2020-03-10 DIAGNOSIS — R26.1 SPASTIC GAIT: ICD-10-CM

## 2020-03-10 PROCEDURE — 99204 OFFICE O/P NEW MOD 45 MIN: CPT | Performed by: PSYCHIATRY & NEUROLOGY

## 2020-03-10 NOTE — PROGRESS NOTES
Review of Systems   Constitutional: Positive for fatigue  Negative for appetite change and fever  HENT: Negative  Negative for hearing loss, tinnitus, trouble swallowing and voice change  Eyes: Negative  Negative for photophobia and pain  Respiratory: Negative  Negative for shortness of breath  Cardiovascular: Negative  Negative for palpitations  Gastrointestinal: Negative  Negative for nausea and vomiting  Endocrine: Negative  Negative for cold intolerance and heat intolerance  Genitourinary: Negative  Negative for dysuria, frequency and urgency  Musculoskeletal: Positive for gait problem  Negative for myalgias and neck pain  Skin: Negative  Negative for rash  Neurological: Positive for numbness  Negative for dizziness, tremors, seizures, syncope, facial asymmetry, speech difficulty, weakness, light-headedness and headaches  Hematological: Negative  Does not bruise/bleed easily  Psychiatric/Behavioral: Negative  Negative for confusion, hallucinations and sleep disturbance

## 2020-03-10 NOTE — PATIENT INSTRUCTIONS
· No recent or relevant laboratory results in last 3 months to review in Epic  · Relevant neuroimaging results reviewed as per HPI  · Obtain EMG/NCS if he does show additional signs  · Thank you very much for sending me this interesting patient  · The patient has been instructed to call us about any new neurological problems or medication side effects  · Return to Clinic in 3 months  If his weakness improves after surgical intervention then it would be even more evident that weakness was secondary to structural cord lesion and less likely neurodegenerative

## 2020-03-10 NOTE — LETTER
March 10, 2020     Juana Hamman, MD  [address] [address]    Patient: Juana Steward   YOB: 1935   Date of Visit: 3/10/2020       Dear Dr Shine Kraus:    Thank you for referring Marianna Bloch to me for evaluation  Below are my notes for this consultation  If you have questions, please do not hesitate to call me  I look forward to following your patient along with you  Sincerely,        Ahmet Sandy MD        CC: DO Esteban Davis  3/10/2020  3:39 PM  Sign at close encounter  Review of Systems   Constitutional: Positive for fatigue  Negative for appetite change and fever  HENT: Negative  Negative for hearing loss, tinnitus, trouble swallowing and voice change  Eyes: Negative  Negative for photophobia and pain  Respiratory: Negative  Negative for shortness of breath  Cardiovascular: Negative  Negative for palpitations  Gastrointestinal: Negative  Negative for nausea and vomiting  Endocrine: Negative  Negative for cold intolerance and heat intolerance  Genitourinary: Negative  Negative for dysuria, frequency and urgency  Musculoskeletal: Positive for gait problem  Negative for myalgias and neck pain  Skin: Negative  Negative for rash  Neurological: Positive for numbness  Negative for dizziness, tremors, seizures, syncope, facial asymmetry, speech difficulty, weakness, light-headedness and headaches  Hematological: Negative  Does not bruise/bleed easily  Psychiatric/Behavioral: Negative  Negative for confusion, hallucinations and sleep disturbance  Ahmet Sandy MD  3/10/2020  9:22 PM  Sign at close encounter  [facility]        NEW PATIENT EVALUATION NOTE    Patient: Juana Steward  Medical Record Number: # [id number]  YOB: 1935  Date of visit: 3/10/2020    Referring provider: No ref   provider found    ASSESSMENT     Diagnoses for this encounter:  1  Cervical spinal stenosis     2  Lumbar radiculitis     3  Spastic gait       Impression of this 83 yo gentleman with known hx of cervical and lumbar stenosis s/p laminectomy and instrumentation, presenting for asymmetric lower extremity weakness  His examination was significant for an altered, stiff gait with some circumduction of the L leg and inability to balance without his walking stick  He did not have any shuffling gait  Also, he was hyperreflexic, no muscle atrophy noted  Recent neck and back imaging has revealed return of stenosis in high and mid cervical cord  The lumbar foraminal stenosis may contribute to his gait abnormalities but he denies any radiating pain  We will await Neurosurgical opinion and Neurology will likely assist in conservative management  He denies any ongoing pain however  PLAN     · No recent or relevant laboratory results in last 3 months to review in Epic  · Relevant neuroimaging results reviewed as per HPI  · Obtain EMG/NCS if he does show additional signs of progressive weakness beyond presently seen  · Thank you very much for sending me this interesting patient  · The patient has been instructed to call us about any new neurological problems or medication side effects  · Return to Clinic in 3 months  If his weakness improves after surgical intervention then it would be even more evident that weakness was secondary to structural cord lesion and less likely neurodegenerative  A total of 60 minutes were spent face-to-face with this patient, of which 25% was spent on counseling and coordination of care  We discussed the natural history of the patient's condition, differential diagnosis, level of diagnostic certainty, treatment alternatives and their side effects and possible complications       HISTORY OF PRESENT ILLNESS:     Mr Lamont Galan is a 82 y o  right handed male with past hx of acute cystitis without hematuria, Atherosclerotic heart disease, Erectile dysfunction, Flaccid neuropathic bladder, not elsewhere classified, Hypercholesteremia, Hypertension, Muscle spasms of neck, Urge incontinence of urine, Urinary retention, and UTI (urinary tract infection) who has been referred to the [address] for evaluation of left lower leg weakness in setting of chronic back pain and spinal stenosis  The patient was accompanied today  History was obtained from patient and spouse  Referred from Dr Enid Paz    Per records, he has seen Dr Eduardo Sigala on 2/1/7/20 recently for evaluation of the indication, originally referred from PCP  The following is taken from earlier notes and confirmed by patient:     He has history of C5-C6 spinal stenosis, L3-L4 stenosis  He had cervical decompression in 2008  He has history of lumbar laminectomies as well  He has a 10+ year history of low back pain treated with epidural injections  At the time, he had radiating pain to the LLE  He reported good relief with epidurals for 2 years  He denies low back pain at rest, but they are exacerbated with standing or walking  He reports chronic imbalance, gait dysfunction even before his cervical spine surgery  He reports worsening of gait in the past few months  He tried seeing PT approximately 3 months ago but stopped after 1 appointment as he felt it was ineffective       Imaging:   - XR c-spine 9/12/2019 (per [facility] records): No fracture  There is anterior screw fixation extending from C3-C6  There are no previous studies  There is extensive carotid artery calcification  There is minimal impingement upon the neuroforamina at C4/C5 on the left  There are degenerative changes of the apophyseal joints of C2/C3 bilaterally  There is loss of the normal cervical lordosis at the level of C5/C6  C5 is slightly angulated compared to C6   - MRI lumbar spine 2013 (per [facility] records)  1   Since the prior MRI of 2007, the patient has undergone right lateral L5 laminectomies  The central canal stenosis at L4-5 has  decreased  2  Mild anterolisthesis of L4, increased since the prior study  No change in the bilateral L4-5 facet arthropathy  Increased neural  foraminal narrowing at L4-5 with compression of the exiting L4 nerve roots  3  Congenital central canal stenosis  - MRI Cervical spine 2013 (per [facility] records):   1  Increased bulging of the C7-T1 disc  Mild central canal stenosis at C7-T1  These are new findings  2  Mild central canal stenosis at C6-7  Neural foraminal narrowing on the left at C2-3 and C7-T1  and bilaterally from C3-4 through C6-7  These findings are unchanged  3  Status post anterior fusion from C3-C6   - MRI Lumbar Spine 3/2/2020:  Degenerative L4-L5, moderate severe bilateral foraminal stenosis and severe bilateral lateral recess stenosis at risk for L5 radiculitis  4 5cm aortic aneurysm    - MRI Cervical Spine 3/2/2020:  Cord compression at C2-C3 without corresponding signal changes, nonspecific narrowing beyond that  EMG/NCS was deferred given lack of findings on physical examination  He does have Neurosurgery appointment scheduled 3/17/20       REVIEW OF PAST MEDICAL, SOCIAL AND FAMILY HISTORY:  This is the list of problems as per our Medical Records:    Patient Active Problem List    Diagnosis Date Noted    Recurrent urinary tract infection 08/21/2019    Erectile dysfunction 08/14/2018    Urge incontinence of urine 08/14/2018    Benign prostatic hyperplasia with nocturia 08/14/2018     Past Medical History:   Diagnosis Date    Acute cystitis without hematuria     Atherosclerotic heart disease     Erectile dysfunction     Flaccid neuropathic bladder, not elsewhere classified     Hypercholesteremia     Hypertension     Muscle spasms of neck     Urge incontinence of urine     Urinary retention     UTI (urinary tract infection)       Past Surgical History:   Procedure Laterality Date    CERVICAL SPINE SURGERY 2010    CYSTOSCOPY  02/08/2017    LUMBAR SPINE SURGERY  2006      Allergies   Allergen Reactions    Ace Inhibitors Cough     Cough    Magnesium Chloride Diarrhea    Oxycodone-Acetaminophen Diarrhea      Outpatient Encounter Medications as of 3/10/2020   Medication Sig Dispense Refill    aspirin 325 mg tablet Take 325 mg by mouth daily      citalopram (CeleXA) 40 mg tablet daily      DULoxetine (CYMBALTA) 60 mg delayed release capsule Take 60 mg by mouth daily      ibuprofen (MOTRIN) 400 mg tablet as needed  0    losartan (COZAAR) 100 MG tablet daily      Magnesium Oxide 400 MG CAPS daily      methocarbamol (ROBAXIN) 750 mg tablet take 1 tablets by mouth three times a day if needed FOR MUSCLE SPASMS  0    metoprolol tartrate (LOPRESSOR) 50 mg tablet 2 (two) times a day      niacin 500 mg tablet daily      Omega-3 Fatty Acids (FISH OIL PO) daily      pravastatin (PRAVACHOL) 80 mg tablet daily      solifenacin (VESICARE) 10 MG tablet Take 10 mg by mouth daily      sildenafil (VIAGRA) 100 mg tablet Take 1 tablet (100 mg total) by mouth daily as needed for erectile dysfunction (Patient not taking: Reported on 2/17/2020) 3 tablet 0    tadalafil (CIALIS) 20 MG tablet Take 1 tablet (20 mg total) by mouth as needed for erectile dysfunction for up to 1010 doses (Patient not taking: Reported on 2/17/2020) 10 tablet 1    traMADol (ULTRAM) 50 mg tablet Take 50 mg by mouth daily at bedtime       No facility-administered encounter medications on file as of 3/10/2020        Social History     Tobacco Use    Smoking status: Never Smoker    Smokeless tobacco: Never Used   Substance Use Topics    Alcohol use: No      Family History   Problem Relation Age of Onset    Cancer Mother     Heart disease Father         REVIEW OF SYSTEMS:  The patient has entered data on an intake form regarding present illness, past medical and surgical history, medications, allergies, family and social history, and a full review of 14 systems  I have reviewed this form with the patient, and all the relevant information has been included on this note  The full review of systems was negative except as stated in HPI and below  Constitutional: Positive for fatigue  Negative for appetite change and fever  HENT: Negative  Negative for hearing loss, tinnitus, trouble swallowing and voice change  Eyes: Negative  Negative for photophobia and pain  Respiratory: Negative  Negative for shortness of breath  Cardiovascular: Negative  Negative for palpitations  Gastrointestinal: Negative  Negative for nausea and vomiting  Endocrine: Negative  Negative for cold intolerance and heat intolerance  Genitourinary: Negative  Negative for dysuria, frequency and urgency  Musculoskeletal: Positive for gait problem  Negative for myalgias and neck pain  Skin: Negative  Negative for rash  Neurological: Positive for numbness  Negative for dizziness, tremors, seizures, syncope, facial asymmetry, speech difficulty, weakness, light-headedness and headaches  Hematological: Negative  Does not bruise/bleed easily  Psychiatric/Behavioral: Negative  Negative for confusion, hallucinations and sleep disturbance  PHYSICAL EXAMINATION:     Vital signs:  BP (!) 171/79 (BP Location: Right arm, Patient Position: Sitting, Cuff Size: Adult)   Pulse 79   Ht 5' 8" (1 727 m)   Wt 78 5 kg (173 lb)   BMI 26 30 kg/m²      General:  Well-appearing, well nourished, pleasant patient in no acute distress  Mood appropriate  Head:  Normocephalic, atraumatic  Oropharynx and conjunctiva are clear  Speech  No hypophonia, no bradylalia  No scanning speech  Language: Comprehension intact  Neck:  Supple, strong 5/5 forward flexion and retroflexion  Extremities: Range of motion is normal       Cognitive and Mental Exam:  The patient is alert, oriented to self, location, date and situation   Memory is normal to provide accurate details of health history    Cranial Nerves:  CN II:  Direct and consensual light reflexes were equally reactive to light symmetrically  No afferent pupillary defect   Visual fields are full to confrontation  CN III / IV / VI: Extraocular movements were full, with normal pursuit and saccades  CN V:   Facial sensation to light touch was intact  CN VII: Face is symmetric with normal strength  CN VIII: Hearing was assessed using the Calibrated Finger Rub Auditory Screening Test (CALFRAST) and was not abnormal (Better than CALFRAST-Strong-70)  CN X:   Palate is up going bilaterally and symmetrically  CN XI:  Neck muscles are strong  CN XII: Tongue protrusion is at midline with normal movements  No dysarthria  Motor:    Dystonia: hammertoe on the 2nd toes of feet, overriding toes  No flexed toes otherwise  Patsi Reil Dyskinesia: none  Myoclonus: none  Chorea: none  Tics: none  [id number]                Time since last dose:    3/10/20      Speech  0     Facial Expression  0    Postural Tremor (Right) 0    Postural Tremor (Left) 0    Kinetic Tremor (Right)  0    Kinetic Tremor (Left)  0    Rest tremor amplitude RUE 0    Rest tremor amplitude LUE 0    Rest tremor amplitude RLE 0    Rest tremor amplitude LLE 0    Lip/Jaw Tremor  0    Consistency of tremor 0    Finger Taps (Right)   0    Finger Taps (Left)  0    Hand Movement (Right)  0    Hand Movement (Left)   0    Pronation/Supination (Right)  0    Pronation/Supination (Left)   0    Toe Tapping (Right) 1    Toe Tapping (Left) 1    Leg Agility (Right)  0    Leg Agility (Left)   0     Rigidity - Neck  2     Rigidity - Upper Extremity (Right)  0      Rigidity - Upper Extremity (Left)   0     Rigidity - Lower Extremity (Right)  0    Rigidity - Lower Extremity (Left)   0    Arising from Chair   1      Gait    2     Freezing of Gait 0     Postural Stability  -     Posture 0     Global spontaneity of movement 1     No clear rigidity, no clear spasticity  -------------------------------------------------------------------------------------    Muscle Strength Right Left  Muscle Strength Right Left   Deltoid 5/5 5/5  Hip Adductors 5/5 5/5   Biceps 5/5 5/5  Hip Abductors 5/5 5/5   Triceps 5/5 5/5  Knee Extensors 5/5 5/5   Wrist Extensors 5/5 5/5  Knee Flexors 5/5 5/5   Wrist Flexors 5/5 5/5  Ankle Extensors 5/5 5/5    5/5 5/5  Ankle Flexors 5/5 5/5   Finger Abductors 5/5 5/5       Hip Flexors 5/5 5/5   Hip Extensors 5/5 5/5   No clear spasticity on muscle group testing of limbs    Coordination:  Finger-to-nose-finger: normal except for intention tremor on the left hand  Gait:  Stands up normally, uses walking stick, has shortened R leg strides and letting the L leg swing more  Slightly scissoring  He cannot initiate Tandem gait at all  Reflexes:    Right Left   Biceps 2/4 3/4   Brachioradialis 2/4 2/4   Triceps 2/4 2/4   Knee 3/4 3/4   Ankle 2/4 2/4   Positive Mclean's on the R hand  Hyperreflexic in both knees, but without clonus        Plantar cutaneous reflex:  Right: upgoing  Left: upgoing

## 2020-03-12 ENCOUNTER — CONSULT (OUTPATIENT)
Dept: NEUROSURGERY | Facility: CLINIC | Age: 85
End: 2020-03-12
Payer: COMMERCIAL

## 2020-03-12 VITALS
BODY MASS INDEX: 25.67 KG/M2 | RESPIRATION RATE: 16 BRPM | WEIGHT: 169.4 LBS | SYSTOLIC BLOOD PRESSURE: 159 MMHG | DIASTOLIC BLOOD PRESSURE: 64 MMHG | HEIGHT: 68 IN | HEART RATE: 68 BPM | TEMPERATURE: 96.9 F

## 2020-03-12 DIAGNOSIS — M48.02 CERVICAL SPINAL STENOSIS: Primary | ICD-10-CM

## 2020-03-12 DIAGNOSIS — M48.02 CERVICAL STENOSIS OF SPINAL CANAL: ICD-10-CM

## 2020-03-12 DIAGNOSIS — R26.1 SPASTIC GAIT: ICD-10-CM

## 2020-03-12 PROCEDURE — 99205 OFFICE O/P NEW HI 60 MIN: CPT | Performed by: NEUROLOGICAL SURGERY

## 2020-03-12 RX ORDER — CHLORHEXIDINE GLUCONATE 0.12 MG/ML
15 RINSE ORAL ONCE
Status: CANCELLED | OUTPATIENT
Start: 2020-03-12 | End: 2020-03-12

## 2020-03-12 RX ORDER — CEFAZOLIN SODIUM 1 G/50ML
1000 SOLUTION INTRAVENOUS ONCE
Status: CANCELLED | OUTPATIENT
Start: 2020-03-12 | End: 2020-03-12
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
## 2020-03-17 ENCOUNTER — TELEPHONE (OUTPATIENT)
Dept: NEUROSURGERY | Facility: CLINIC | Age: 85
End: 2020-03-17

## 2020-03-17 NOTE — TELEPHONE ENCOUNTER
----- Message from Anshul Bruner MD sent at 3/12/2020  4:57 PM EDT -----  Please send a copy of the patient's note to the patient and his cardiologist and family doctor    Thank you

## 2020-03-19 ENCOUNTER — TELEPHONE (OUTPATIENT)
Dept: NEUROSURGERY | Facility: CLINIC | Age: 85
End: 2020-03-19

## 2020-03-19 NOTE — TELEPHONE ENCOUNTER
Wife reports pt was seen in consult and surgery recommended  She would like the OV note faxed to cardiologist [phone]  Pt notified it was sent

## 2020-04-15 ENCOUNTER — TELEPHONE (OUTPATIENT)
Dept: NEUROLOGY | Facility: CLINIC | Age: 85
End: 2020-04-15

## 2020-04-15 DIAGNOSIS — M65.322 TRIGGER INDEX FINGER OF LEFT HAND: Primary | ICD-10-CM

## 2020-06-08 ENCOUNTER — OFFICE VISIT (OUTPATIENT)
Dept: NEUROLOGY | Facility: CLINIC | Age: 85
End: 2020-06-08
Payer: COMMERCIAL

## 2020-06-08 VITALS
BODY MASS INDEX: 24.71 KG/M2 | DIASTOLIC BLOOD PRESSURE: 64 MMHG | HEIGHT: 68 IN | SYSTOLIC BLOOD PRESSURE: 134 MMHG | HEART RATE: 64 BPM | WEIGHT: 163 LBS

## 2020-06-08 DIAGNOSIS — G95.9 CERVICAL MYELOPATHY (HCC): Primary | ICD-10-CM

## 2020-06-08 PROCEDURE — 99213 OFFICE O/P EST LOW 20 MIN: CPT | Performed by: PHYSICAL MEDICINE & REHABILITATION

## 2020-06-22 ENCOUNTER — OFFICE VISIT (OUTPATIENT)
Dept: NEUROLOGY | Facility: CLINIC | Age: 85
End: 2020-06-22
Payer: COMMERCIAL

## 2020-06-22 VITALS
HEART RATE: 55 BPM | DIASTOLIC BLOOD PRESSURE: 56 MMHG | TEMPERATURE: 98.9 F | WEIGHT: 163.5 LBS | HEIGHT: 68 IN | SYSTOLIC BLOOD PRESSURE: 122 MMHG | BODY MASS INDEX: 24.78 KG/M2

## 2020-06-22 DIAGNOSIS — R26.1 SPASTIC GAIT: ICD-10-CM

## 2020-06-22 DIAGNOSIS — M48.02 CERVICAL SPINAL STENOSIS: Primary | ICD-10-CM

## 2020-06-22 PROCEDURE — 99213 OFFICE O/P EST LOW 20 MIN: CPT | Performed by: PSYCHIATRY & NEUROLOGY

## 2020-06-29 ENCOUNTER — EVALUATION (OUTPATIENT)
Dept: PHYSICAL THERAPY | Facility: CLINIC | Age: 85
End: 2020-06-29
Payer: COMMERCIAL

## 2020-06-29 DIAGNOSIS — G95.9 CERVICAL MYELOPATHY (HCC): ICD-10-CM

## 2020-06-29 PROCEDURE — 97163 PT EVAL HIGH COMPLEX 45 MIN: CPT | Performed by: PHYSICAL THERAPIST

## 2020-07-07 ENCOUNTER — OFFICE VISIT (OUTPATIENT)
Dept: PHYSICAL THERAPY | Facility: CLINIC | Age: 85
End: 2020-07-07
Payer: COMMERCIAL

## 2020-07-07 DIAGNOSIS — G95.9 CERVICAL MYELOPATHY (HCC): Primary | ICD-10-CM

## 2020-07-07 PROCEDURE — 97112 NEUROMUSCULAR REEDUCATION: CPT | Performed by: PHYSICAL THERAPIST

## 2020-07-07 PROCEDURE — 97116 GAIT TRAINING THERAPY: CPT | Performed by: PHYSICAL THERAPIST

## 2020-07-07 PROCEDURE — 97110 THERAPEUTIC EXERCISES: CPT | Performed by: PHYSICAL THERAPIST

## 2020-07-07 NOTE — PROGRESS NOTES
Daily Note     Today's date: 2020  Patient name: Bradley Thayer  : 1935  MRN: [id number]  Referring provider: Franky Barros MD  Dx:   Encounter Diagnosis     ICD-10-CM    1  Cervical myelopathy (HCC) G95 9                   Subjective: reports he has been having a recurrence of neck pain this week, had this  A while ago and re'cd muscle relaxers and injections which helped  Also reports he had a fall the day after his PT evaluation, was gardening with this his down for too long when he stood up he lost his balance backwards, feel backwards and hit his head , denies any pain, headache of dizziness at this time  Objective: See treatment diary below      Assessment: Tolerated treatment well demonstrate lob backwards with static balance, required verbal cues to readjust back to center  SOLO step in use for most of today's session, try more activities without use of hand support in harness systerm as able to focus on natural balance reactions  Requires verbal cues to lift feet, land with heel strike and take longer step while walking on treadmill, unable to continue adequate gait pattern when distracted  Good tolerance to ambulation over ground in solo step without use of AD  Plan: Continue per plan of care  continue to progress tolerance to activity and gait mechanics        Short Term Goal Expiration Date:(4 weeks 2020 )  Long Term Goal Expiration Date: (8 weeks 202012 weeks 2020)  POC Expiration Date: (12 weeks 2020)            Precautions Fall risk, cervical myelopathy        Manuals 2020                                       Neuro Re-Ed         sidestepping airex foam beams  4 laps       Static balance airex   FAEO 30"x2  HT 30"x2  HN 30"x2  FTEO 30"x1           Alt Cone taps airex  20x wUE  20x w/0 UE                                         Ther Ex        Step ups Fwd 10x ea   Lat 10x ea                                                               Ther Activity Gait Training        // bars 4 laps        SOLO step  No AD   120ft x2 with standing rest break       treadmill 1  1mph  5 min  RPE 7/10       Modalities

## 2020-07-10 ENCOUNTER — OFFICE VISIT (OUTPATIENT)
Dept: PHYSICAL THERAPY | Facility: CLINIC | Age: 85
End: 2020-07-10
Payer: COMMERCIAL

## 2020-07-10 DIAGNOSIS — G95.9 CERVICAL MYELOPATHY (HCC): Primary | ICD-10-CM

## 2020-07-10 PROCEDURE — 97116 GAIT TRAINING THERAPY: CPT | Performed by: PHYSICAL THERAPIST

## 2020-07-10 PROCEDURE — 97112 NEUROMUSCULAR REEDUCATION: CPT | Performed by: PHYSICAL THERAPIST

## 2020-07-10 NOTE — PROGRESS NOTES
Daily Note     Today's date: 7/10/2020  Patient name: Mj Alexis  : 1935  MRN: [id number]  Referring provider: Raymond Morales MD  Dx:   Encounter Diagnosis     ICD-10-CM    1  Cervical myelopathy (HCC) G95 9                   Subjective: Patient questions if he is coming to PT for diagnosis of treatment  Agrees he needs to work on his walking an balance, would like exercises to perform at home  Objective: See treatment diary below      Assessment: Reviewed activities for hep, educated patient on purpose of each activity performed today, required cues for safety awareness, little carry over noted during exercises - makes the same mistake each repeititon - unable to adjust footing to clear steps with side stepping over hurdles  Requires cues for heel strike and forward weightshift with gait overground and on treadmill  Patient issued written instructions and pictures for activities with noted safety advice  Plan: Continue per plan of care  continue to progress tolerance to activity and gait mechanics  Continue to progress tolerance to consecutive walking on treadmill and overground without seated rest break        Short Term Goal Expiration Date:(4 weeks 2020 )  Long Term Goal Expiration Date: (8 weeks 202012 weeks 2020)  POC Expiration Date: (12 weeks 2020)            Precautions Fall risk, cervical myelopathy        Manuals 2020 07/10/                                      Neuro Re-Ed         sidestepping airex foam beams  4 laps Solo step    no UE  15ft x 4 laps      Static balance airex   FAEO 30"x2  HT 30"x2  HN 30"x2  FTEO 30"x1     Weight shifting   A/P   EO 10x  EC 10x    M/L   EO 10x  EC 10x       Alt Cone taps airex  20x wUE  20x w/0 UE         Backwards walking  SOLO step   No Ue   4 laps      hurdels  SOLO step   Fwd 4 laps no UE  Lat 4 laps no UE                      Ther Ex        Step ups Fwd 10x ea   Lat 10x ea 6inch  Fwd 10x -15x  Lat 10x-15x        STS 20x                                                      Ther Activity                        Gait Training        // bars 4 laps        SOLO step  No AD   120ft x2 with standing rest break No AD  Fwd weight shifting  50ft    50ft concentrating on lifting feet from floor     100ft x 2      treadmill 1  1mph  5 min  RPE 7/10 SOLO step   1 5 mph  2min 30"  1 2 mph  3 min      Modalities

## 2020-07-13 ENCOUNTER — OFFICE VISIT (OUTPATIENT)
Dept: PHYSICAL THERAPY | Facility: CLINIC | Age: 85
End: 2020-07-13
Payer: COMMERCIAL

## 2020-07-13 DIAGNOSIS — G95.9 CERVICAL MYELOPATHY (HCC): Primary | ICD-10-CM

## 2020-07-13 PROCEDURE — 97112 NEUROMUSCULAR REEDUCATION: CPT | Performed by: PHYSICAL THERAPIST

## 2020-07-13 PROCEDURE — 97110 THERAPEUTIC EXERCISES: CPT | Performed by: PHYSICAL THERAPIST

## 2020-07-13 PROCEDURE — 97116 GAIT TRAINING THERAPY: CPT | Performed by: PHYSICAL THERAPIST

## 2020-07-13 NOTE — PROGRESS NOTES
Daily Note     Today's date: 2020  Patient name: Oswald Das  : 1935  MRN: [id number]  Referring provider: Josy Evans MD  Dx:   Encounter Diagnosis     ICD-10-CM    1  Cervical myelopathy (HCC) G95 9                   Subjective: Reports he has sore legs today, not necessarily from the "workouts" but in general  [place] Philip went okay  Objective: See treatment diary below      Assessment: step ups limited by patients fatigue  With instances of losing balance backwards, has a hard time recognizing his loss of balance and unable to correct quick enough, requires verbal and manual cues  Requires cues to attend to tasks  Plan: Continue per plan of care  continue to progress tolerance to activity and gait mechanics  Continue to progress tolerance to consecutive walking on treadmill and overground without seated rest break        Short Term Goal Expiration Date:(4 weeks 2020 )  Long Term Goal Expiration Date: (8 weeks 202012 weeks 2020)  POC Expiration Date: (12 weeks 2020)            Precautions Fall risk, cervical myelopathy        Manuals 2020 07/10/ 07/13                                     Neuro Re-Ed         sidestepping airex foam beams  4 laps Solo step    no UE  15ft x 4 laps      Static balance airex   FAEO 30"x2  HT 30"x2  HN 30"x2  FTEO 30"x1     Weight shifting   A/P   EO 10x  EC 10x    M/L   EO 10x  EC 10x  airex  FTEO 30"x1  FAEC 30"x2  FAEO  HT 30"x2  HN 30"x1     Alt Cone taps airex  20x wUE  20x w/0 UE         Backwards walking  SOLO step   No Ue   4 laps SOLO step 120ft     hurdels  SOLO step   Fwd 4 laps no UE  Lat 4 laps no UE                      Ther Ex        Step ups Fwd 10x ea   Lat 10x ea 6inch  Fwd 10x -15x  Lat 10x-15x   Solo step  6inch    Fwd: L10x  R 5x    Lat   R 6x  L 5x       STS  20x                                                      Ther Activity                        Gait Training        // bars 4 laps        SOLO step  No AD 120ft x2 with standing rest break No AD  Fwd weight shifting  50ft    50ft concentrating on lifting feet from floor     100ft x 2 Solo step no AD   180ft    Marching no 180ft       treadmill 1  1mph  5 min   RPE 7/10 SOLO step   1 5 mph  2min 30"  1 2 mph  3 min SOLO step   1  5mph  5 min x 1  7 min  X 1  RPE 8/10     Modalities

## 2020-07-16 ENCOUNTER — OFFICE VISIT (OUTPATIENT)
Dept: PHYSICAL THERAPY | Facility: CLINIC | Age: 85
End: 2020-07-16
Payer: COMMERCIAL

## 2020-07-16 DIAGNOSIS — G95.9 CERVICAL MYELOPATHY (HCC): Primary | ICD-10-CM

## 2020-07-16 PROCEDURE — 97110 THERAPEUTIC EXERCISES: CPT | Performed by: PHYSICAL THERAPIST

## 2020-07-16 PROCEDURE — 97112 NEUROMUSCULAR REEDUCATION: CPT | Performed by: PHYSICAL THERAPIST

## 2020-07-16 PROCEDURE — 97116 GAIT TRAINING THERAPY: CPT | Performed by: PHYSICAL THERAPIST

## 2020-07-16 NOTE — PROGRESS NOTES
Daily Note     Today's date: 2020  Patient name: Osvaldo Mora  : 1935  MRN: [id number]  Referring provider: Murtaza Kuo MD  Dx:   Encounter Diagnosis     ICD-10-CM    1  Cervical myelopathy (HCC) G95 9                   Subjective: has been compliant with HEP would like a list of cues to concentrate on while walking  Didn't like the pace of the treadmill last session, asked for adjustment this session  Objective: See treatment diary below      Assessment: patient found 1 4 mph to be a more comfortable pace on treadmill this session  Overall demonstrated improved  tolerance and performance with all activities with minor loss of balance  Plan: Continue per plan of care  continue to progress tolerance to activity and gait mechanics  Continue to progress tolerance to consecutive walking on treadmill and overground without seated rest break        Short Term Goal Expiration Date:(4 weeks 2020 )  Long Term Goal Expiration Date: (8 weeks 202012 weeks 2020)  POC Expiration Date: (12 weeks 2020)            Precautions Fall risk, cervical myelopathy        Manuals 2020 07/10/ 07/13 07/16                                    Neuro Re-Ed         sidestepping airex foam beams  4 laps Solo step    no UE  15ft x 4 laps      Static balance airex   FAEO 30"x2  HT 30"x2  HN 30"x2  FTEO 30"x1     Weight shifting   A/P   EO 10x  EC 10x    M/L   EO 10x  EC 10x  airex  FTEO 30"x1  FAEC 30"x2  FAEO  HT 30"x2  HN 30"x1     Alt Cone taps airex  20x wUE  20x w/0 UE         Backwards walking  SOLO step   No Ue   4 laps SOLO step 120ft SOLO step 120ft    hurdels  SOLO step   Fwd 4 laps no UE  Lat 4 laps no UE  SOLO step   Fwd 4 laps no UE  Lat 4 laps no UE                    Ther Ex        Step ups Fwd 10x ea   Lat 10x ea 6inch  Fwd 10x -15x  Lat 10x-15x   Solo step  6inch    Fwd: L10x  R 5x    Lat   R 6x  L 5x   Solo step   6 inch    HHA   10x ea     Lat 10x ea       STS  20x  2x10 Ther Activity                        Gait Training        // bars 4 laps        SOLO step  No AD   120ft x2 with standing rest break No AD  Fwd weight shifting  50ft    50ft concentrating on lifting feet from floor     100ft x 2 Solo step no AD   180ft    Marching no 180ft   No AD  180ft     Focus on forward weight shift and landing on heel    treadmill 1  1mph  5 min   RPE 7/10 SOLO step   1 5 mph  2min 30"  1 2 mph  3 min SOLO step   1  5mph  5 min x 1  7 min  X 1  RPE 8/10 SOLO step   1  4mph  12 min      Modalities

## 2020-07-20 ENCOUNTER — APPOINTMENT (OUTPATIENT)
Dept: PHYSICAL THERAPY | Facility: CLINIC | Age: 85
End: 2020-07-20
Payer: COMMERCIAL

## 2020-07-24 ENCOUNTER — OFFICE VISIT (OUTPATIENT)
Dept: PHYSICAL THERAPY | Facility: CLINIC | Age: 85
End: 2020-07-24
Payer: COMMERCIAL

## 2020-07-24 DIAGNOSIS — G95.9 CERVICAL MYELOPATHY (HCC): Primary | ICD-10-CM

## 2020-07-24 PROCEDURE — 97110 THERAPEUTIC EXERCISES: CPT | Performed by: PHYSICAL THERAPIST

## 2020-07-24 PROCEDURE — 97112 NEUROMUSCULAR REEDUCATION: CPT | Performed by: PHYSICAL THERAPIST

## 2020-07-24 NOTE — PROGRESS NOTES
Daily Note     Today's date: 2020  Patient name: Keaton Andres  : 1935  MRN: [id number]  Referring provider: Loulou Thorne MD  Dx:   Encounter Diagnosis     ICD-10-CM    1  Cervical myelopathy ([facility] [address] ) G95 9        Start Time: 1105          Subjective: reports he has  been compliant with hep  Notes his neck has been bothering him the only thing that has helped in the past was massage, but is taking muscle relaxer's      Objective: See treatment diary below      Assessment: Asked patient if he would like to start PT for cervical spine at this time he declined  Remains with decreased step height and length with all activities  Trouble with static balance redirecting back to center  continues to keep weight posterior  Plan: Continue per plan of care  continue to progress tolerance to activity and gait mechanics  Continue to progress tolerance to consecutive walking on treadmill and overground without seated rest break        Short Term Goal Expiration Date:(4 weeks 2020 )  Long Term Goal Expiration Date: (8 weeks 202012 weeks 2020)  POC Expiration Date: (12 weeks 2020)            Precautions Fall risk, cervical myelopathy        Manuals 2020 07/10/ 07/13 07/16 2020                                   Neuro Re-Ed         sidestepping airex foam beams  4 laps Solo step    no UE  15ft x 4 laps   // bars 4 laps   Static balance airex   FAEO 30"x2  HT 30"x2  HN 30"x2  FTEO 30"x1     Weight shifting   A/P   EO 10x  EC 10x    M/L   EO 10x  EC 10x  airex  FTEO 30"x1  FAEC 30"x2  FAEO  HT 30"x2  HN 30"x1  Airex  FAEO 30"  FAEC 30"x2    FTEO 30"  Ht 30"x2  HN 30"x  FTEC 30"x2   Alt Cone taps airex  20x wUE  20x w/0 UE         Backwards walking  SOLO step   No Ue   4 laps SOLO step 120ft SOLO step 120ft // bars   4 laps   hurdels  SOLO step   Fwd 4 laps no UE  Lat 4 laps no UE  SOLO step   Fwd 4 laps no UE  Lat 4 laps no UE    Dynamic      HT/HN   // bars 4 laps each            Ther Ex Step ups Fwd 10x ea   Lat 10x ea 6inch  Fwd 10x -15x  Lat 10x-15x   Solo step  6inch    Fwd: L10x  R 5x    Lat   R 6x  L 5x   Solo step   6 inch    HHA   10x ea     Lat 10x ea       STS  20x  2x10    bike     L1 10 min                                           Ther Activity                        Gait Training        // bars 4 laps        SOLO step  No AD   120ft x2 with standing rest break No AD  Fwd weight shifting  50ft    50ft concentrating on lifting feet from floor     100ft x 2 Solo step no AD   180ft    Marching no 180ft   No AD  180ft     Focus on forward weight shift and landing on heel    treadmill 1  1mph  5 min   RPE 7/10 SOLO step   1 5 mph  2min 30"  1 2 mph  3 min SOLO step   1  5mph  5 min x 1  7 min  X 1  RPE 8/10 SOLO step   1  4mph  12 min      Modalities

## 2020-07-27 ENCOUNTER — OFFICE VISIT (OUTPATIENT)
Dept: PHYSICAL THERAPY | Facility: CLINIC | Age: 85
End: 2020-07-27
Payer: COMMERCIAL

## 2020-07-27 DIAGNOSIS — G95.9 CERVICAL MYELOPATHY (HCC): Primary | ICD-10-CM

## 2020-07-27 PROCEDURE — 97112 NEUROMUSCULAR REEDUCATION: CPT | Performed by: PHYSICAL THERAPIST

## 2020-07-27 PROCEDURE — 97116 GAIT TRAINING THERAPY: CPT | Performed by: PHYSICAL THERAPIST

## 2020-07-27 NOTE — PROGRESS NOTES
Daily Note     Today's date: 2020  Patient name: Sandip Riddle  : 1935  MRN: [id number]  Referring provider: John Paige MD  Dx:   Encounter Diagnosis     ICD-10-CM    1  Cervical myelopathy (HCC) G95 9                   Subjective: denies any pain or issues prior to session today, but feels he isn't making much progress as he is still off balance  Objective: See treatment diary below      Assessment: trouble getting adjusted on bike today, feet frequently falling out of pedals requiring assistance to adjust - required seated rest break post  Challenged with static balance uneven surface with FT - especially eyes closed frequently illicits stepping pattern for recovery but needing outside support surface to maintain balance  Difficulty with marching  Requires consistent cues for adequate step length and landing on heel and for weight shift forward, with tendency to lean  with M/L weight shift   Plan: Continue per plan of care  PROGRESS UPDATE at [facility]  continue to progress tolerance to activity and gait mechanics  Continue to progress tolerance to consecutive walking on treadmill and overground without seated rest break        Short Term Goal Expiration Date:(4 weeks 2020 )  Long Term Goal Expiration Date: (8 weeks 202012 weeks 2020)  POC Expiration Date: (12 weeks 2020)            Precautions Fall risk, cervical myelopathy        Manuals 2020                                   Neuro Re-Ed         sidestepping     // bars 4 laps   Static balance solo step    airex  FAEO 30"  HT/HN 30" ea    FAEC 30"  FTEO 30"x2  FTEC 30"x3        airex  FTEO 30"x1  FAEC 30"x2  FAEO  HT 30"x2  HN 30"x1  Airex  FAEO 30"  FAEC 30"x2    FTEO 30"  Ht 30"x2  HN 30"x  FTEC 30"x2   Weight shifting  Level  EO  M/L 5x  A/P 5x   EC   M/L 7x  A/P 7x          Backwards walking   SOLO step 120ft SOLO step 120ft // bars   4 laps   hurdels    SOLO step   Fwd 4 laps no UE  Lat 4 laps no UE    Dynamic      HT/HN   // bars 4 laps each            Ther Ex        Step ups   Solo step  6inch    Fwd: L10x  R 5x    Lat   R 6x  L 5x   Solo step   6 inch    HHA   10x ea     Lat 10x ea       STS    2x10    bike L2 x 8 min on and off    L1 10 min                                           Ther Activity                        Gait Training        // bars        SOLO step  No AD  180ft  Solo step no AD   180ft    Marching no 180ft   No AD  180ft     Focus on forward weight shift and landing on heel    treadmill   SOLO step   1  5mph  5 min x 1  7 min  X 1  RPE 8/10 SOLO step   1  4mph  12 min      Modalities

## 2020-07-29 ENCOUNTER — OFFICE VISIT (OUTPATIENT)
Dept: PHYSICAL THERAPY | Facility: CLINIC | Age: 85
End: 2020-07-29
Payer: COMMERCIAL

## 2020-07-29 DIAGNOSIS — G95.9 CERVICAL MYELOPATHY (HCC): Primary | ICD-10-CM

## 2020-07-29 PROCEDURE — 97112 NEUROMUSCULAR REEDUCATION: CPT | Performed by: PHYSICAL THERAPIST

## 2020-07-29 PROCEDURE — 97116 GAIT TRAINING THERAPY: CPT | Performed by: PHYSICAL THERAPIST

## 2020-07-29 NOTE — PROGRESS NOTES
Discharge    Today's date: 2020  Patient name: Yesika Narayan  : 1935  MRN: [id number]  Referring provider: Abner Yeung MD  Dx:   Encounter Diagnosis     ICD-10-CM    1  Cervical myelopathy (HCC) G95 9                   Assessment: Despite patient subjective of not making any progress, he demonstrate improved endurance by being able to complete 6 MWT, improved gait speed and 5x STS  Remains a fall risk per TUG  Patient was educated on the importance of keeping up with exercises to maintain the gains he achieved  Instructed to use walking stick more often as he has tendency to carry it around and grab or furniture and walls  Despite PT's recommendation to continue with therapy, patient declines and wishes to be D/C'd  Goals  STG  Patient will improve TUG to 12s or less with or w/o AD indicating he is at decreased risk for falls within 4 weeks - NOT MET  Patient will improve gait speedy by 0 5ft/s or greater within 4 weeks - MET  Patient will improve 5x STS to 15sec or less indicating improve LE strength and endurance within 4 weeks -MET  LTG  Patient will be able to complete 6 MWT within 8 weeks  - MET  Patient will demonstrate improved static balance with eyes closed during 2nd and 4th condition of mCTSIB within 8 weeks - Partially MET  Patient will complete 6MWT with or without AD in 1000ft or greater within 12 weeks - NOT MET  Patient will improve gait speed to 3 0ft/s or greater within 12 weeks - NET MET     Plan: Continue per plan of care  PROGRESS UPDATE at [facility]  continue to progress tolerance to activity and gait mechanics  Continue to progress tolerance to consecutive walking on treadmill and overground without seated rest break     Subjective: denies any pain or issues prior to session today, but feels he isn't making much progress as he is still off balance  Objective: See treatment diary below      Balance Test    6 Minute Walk Test (ft): Unable to compltete  2020: 600ft w/walking stick 2 Minute Walk Test (ft): 200ft with walking stick    Gait Speed (ft/s): 20ft/10 69 = 1 8ft/s  2020: 20ft/8 59=2 32ft/s   5x Sit To Stand (s): 16 53s    Multiple attempts to get up   2020: 15s  NO UE 1st attempt   TU 62s w/walking stick   2020: 16 9s     MCTSIB:  FTEO 30"  FTEC 30"  Airex  FTEO 30"  FTEC 9"       Short Term Goal Expiration Date:(4 weeks 2020 )  Long Term Goal Expiration Date: (8 weeks 202012 weeks 2020)  POC Expiration Date: (12 weeks 2020)            Precautions Fall risk, cervical myelopathy        Manuals 2020                                   Neuro Re-Ed         sidestepping     // bars 4 laps   Static balance solo step    airex  FAEO 30"  HT/HN 30" ea    FAEC 30"  FTEO 30"x2  FTEC 30"x3        airex  FTEO 30"x1  FAEC 30"x2  FAEO  HT 30"x2  HN 30"x1  Airex  FAEO 30"  FAEC 30"x2    FTEO 30"  Ht 30"x2  HN 30"x  FTEC 30"x2   Weight shifting  Level  EO  M/L 5x  A/P 5x   EC   M/L 7x  A/P 7x    Level  EO  M/L 5x  A/P 5x   EC   M/L 7x  A/P 7x       Backwards walking   SOLO step 120ft SOLO step 120ft // bars   4 laps   hurdels    SOLO step   Fwd 4 laps no UE  Lat 4 laps no UE    Dynamic   Eyes closed // bars 4 laps    HT/HN   // bars 4 laps each            Ther Ex        Step ups   Solo step  6inch    Fwd: L10x  R 5x    Lat   R 6x  L 5x   Solo step   6 inch    HHA   10x ea     Lat 10x ea       STS    2x10    bike L2 x 8 min on and off    L1 10 min                                           Ther Activity                        Gait Training        // bars        SOLO step  No AD  180ft  Solo step no AD   180ft    Marching no 180ft   No AD  180ft     Focus on forward weight shift and landing on heel    treadmill   SOLO step   1  5mph  5 min x 1  7 min  X 1  RPE 8/10 SOLO step   1  4mph  12 min      Modalities

## 2020-08-26 ENCOUNTER — OFFICE VISIT (OUTPATIENT)
Dept: UROLOGY | Facility: MEDICAL CENTER | Age: 85
End: 2020-08-26
Payer: COMMERCIAL

## 2020-08-26 VITALS
HEART RATE: 56 BPM | HEIGHT: 68 IN | TEMPERATURE: 97.8 F | DIASTOLIC BLOOD PRESSURE: 70 MMHG | WEIGHT: 163 LBS | BODY MASS INDEX: 24.71 KG/M2 | SYSTOLIC BLOOD PRESSURE: 116 MMHG

## 2020-08-26 DIAGNOSIS — N39.41 URGE INCONTINENCE OF URINE: Primary | ICD-10-CM

## 2020-08-26 DIAGNOSIS — N52.9 ERECTILE DYSFUNCTION, UNSPECIFIED ERECTILE DYSFUNCTION TYPE: ICD-10-CM

## 2020-08-26 DIAGNOSIS — N40.1 BENIGN PROSTATIC HYPERPLASIA WITH NOCTURIA: ICD-10-CM

## 2020-08-26 DIAGNOSIS — R35.1 BENIGN PROSTATIC HYPERPLASIA WITH NOCTURIA: ICD-10-CM

## 2020-08-26 DIAGNOSIS — R39.15 URGENCY OF URINATION: ICD-10-CM

## 2020-08-26 DIAGNOSIS — N52.8 OTHER MALE ERECTILE DYSFUNCTION: ICD-10-CM

## 2020-08-26 PROCEDURE — 99214 OFFICE O/P EST MOD 30 MIN: CPT | Performed by: UROLOGY

## 2020-08-26 RX ORDER — AMLODIPINE BESYLATE 5 MG/1
TABLET ORAL
COMMUNITY
Start: 2020-08-08 | End: 2021-01-01 | Stop reason: ALTCHOICE

## 2020-08-26 RX ORDER — METHOCARBAMOL 500 MG/1
500 TABLET, FILM COATED ORAL 3 TIMES DAILY PRN
COMMUNITY
Start: 2020-08-21 | End: 2021-01-01 | Stop reason: ALTCHOICE

## 2020-08-26 RX ORDER — CEPHALEXIN 500 MG/1
CAPSULE ORAL
COMMUNITY
End: 2021-01-01

## 2020-08-26 RX ORDER — SOLIFENACIN SUCCINATE 5 MG/1
TABLET, FILM COATED ORAL
COMMUNITY
End: 2021-01-01 | Stop reason: ALTCHOICE

## 2020-08-26 NOTE — PROGRESS NOTES
Assessment/Plan:    Urgency of urination  Offered Rx but pt declined  Benign prostatic hyperplasia with nocturia  Symptomatic but content with the status quo  Reassess in 1 year  Erectile dysfunction  Oral medications have not worked  The patient does not desire further of treatment  Diagnoses and all orders for this visit:    Urge incontinence of urine    Benign prostatic hyperplasia with nocturia    Urgency of urination    Erectile dysfunction, unspecified erectile dysfunction type    Other male erectile dysfunction    Other orders  -     amLODIPine (NORVASC) 5 mg tablet  -     cephalexin (KEFLEX) 500 mg capsule; TAKE 1 CAPSULE BY MOUTH EVERY MORNING  -     methocarbamol (ROBAXIN) 500 mg tablet; Take 500 mg by mouth 3 (three) times a day as needed  -     solifenacin (VESICARE) 5 mg tablet; TAKE TWO TABLETS BY MOUTH EVERY DAY          Subjective:      Patient ID: Alin Kate is a 82 y o  male  HPI  BPH:  He notes urinary urgency and nocturia x 1  He denies other significant urinary symptoms  He denies gross hematuria, urinary tract infections or incontinence  He is taking neither medications nor supplements for his symptoms  Major complaint is urgency  No accidents  Knows the BR's in town  Does not carry urinal   Content to live with this  PSA:  No longer monitored due to the patient's age  AUA SYMPTOM SCORE      Most Recent Value   AUA SYMPTOM SCORE   How often have you had a sensation of not emptying your bladder completely after you finished urinating? 1   How often have you had to urinate again less than two hours after you finished urinating? 1   How often have you found you stopped and started again several times when you urinate?  0   How often have you found it difficult to postpone urination? 3   How often have you had a weak urinary stream?  0   How often have you had to push or strain to begin urination?   0   How many times did you most typically get up to urinate from the time you went to bed at night until the time you got up in the morning? 1   Quality of Life: If you were to spend the rest of your life with your urinary condition just the way it is now, how would you feel about that?  2   AUA SYMPTOM SCORE  6        No urine specimen  Erectile dysfunction:  Neither sildenafil nor tadalafil worked  No longer an issue  The following portions of the patient's history were reviewed and updated as appropriate: allergies, current medications, past family history, past medical history, past social history, past surgical history and problem list     Review of Systems   Constitutional: Negative for activity change and fatigue  Respiratory: Negative for shortness of breath and wheezing  Cardiovascular: Negative for chest pain  Hypertension  Gastrointestinal: Negative for abdominal pain  Genitourinary: Negative for difficulty urinating, dysuria, frequency, hematuria and urgency  Musculoskeletal: Negative for back pain and gait problem  Skin: Negative  Allergic/Immunologic: Negative  Neurological: Negative  Having balance problems  Uses staff to walk and clings to walls  Psychiatric/Behavioral: Negative  Objective:      /70   Pulse 56   Temp 97 8 °F (36 6 °C)   Ht 5' 8" (1 727 m)   Wt 73 9 kg (163 lb)   BMI 24 78 kg/m²          Physical Exam  Constitutional:       Appearance: He is well-developed  HENT:      Head: Normocephalic and atraumatic  Neck:      Musculoskeletal: Normal range of motion  Pulmonary:      Effort: Pulmonary effort is normal    Genitourinary:     Comments: Did not perform  Prostate has always been small  Musculoskeletal: Normal range of motion  Skin:     General: Skin is warm and dry  Neurological:      Mental Status: He is alert and oriented to person, place, and time  Psychiatric:         Behavior: Behavior normal          Thought Content:  Thought content normal  Judgment: Judgment normal

## 2020-09-02 ENCOUNTER — TELEPHONE (OUTPATIENT)
Dept: NEUROLOGY | Facility: CLINIC | Age: 85
End: 2020-09-02

## 2020-09-02 ENCOUNTER — OFFICE VISIT (OUTPATIENT)
Dept: NEUROLOGY | Facility: CLINIC | Age: 85
End: 2020-09-02
Payer: COMMERCIAL

## 2020-09-02 VITALS
SYSTOLIC BLOOD PRESSURE: 144 MMHG | HEIGHT: 68 IN | TEMPERATURE: 97 F | WEIGHT: 164 LBS | BODY MASS INDEX: 24.86 KG/M2 | DIASTOLIC BLOOD PRESSURE: 78 MMHG | HEART RATE: 61 BPM

## 2020-09-02 DIAGNOSIS — M48.02 CERVICAL SPINAL STENOSIS: Primary | ICD-10-CM

## 2020-09-02 DIAGNOSIS — R26.1 SPASTIC GAIT: ICD-10-CM

## 2020-09-02 PROCEDURE — 99214 OFFICE O/P EST MOD 30 MIN: CPT | Performed by: PHYSICIAN ASSISTANT

## 2020-09-02 NOTE — TELEPHONE ENCOUNTER
MSW attempted to reach patient/wife at [phone]  No answer  MSW left a message requesting callback  Awaiting same

## 2020-09-02 NOTE — PROGRESS NOTES
Patient ID: Bradley Thayer is a 82 y o  male  Assessment/Plan:    Cervical spinal stenosis  Patient presents for follow-up regarding chronic issues including cervical stenosis and neck pain/spasms  He had been following with Dr Ora Guardado with physiatry however he is no longer outpatient and we discussed referral to [address] [place] [address] physiatry at this time  We also had a long discussion regarding his MRI of the cervical spine  We reviewed this in detail in the office today with both patient and his wife  We discussed the severity of the stenosis or tightening around the cervical cord at 2 levels within the spine  I also reviewed his previous neurosurgery note which discussed potential surgery for this  I advised that he contact the neurosurgeon to have further discussion regarding the risk versus benefit of having this surgery performed  In the interim I did discuss the importance of fall precautions and trying to avoid situations put him risk  All questions were answered  Subjective:    Mr Shen Bradley is an 82 y o  right handed male with past hx of acute cystitis without hematuria, Atherosclerotic heart disease, Erectile dysfunction, Flaccid neuropathic bladder, Hypercholesteremia, Hypertension, Muscle spasms of neck, Urge incontinence, Urinary retention, and UTI who returns for follow up regarding leg weakness, cervical / lumbar stenosis and myelopathy  Last visit 6/22/20 with Dr Chilo Chanel  At his last visit no changes were made and he was planning on starting PT  He was recommended have a cervical laminectomy and fusion but the patient opted to defer  He also continuesdto follow with Dr Ora Guardado in physiatry  Interval history:  No real changes since his last visit  He continues to use a walking stick for extra balance  He was recently seen at [facility] for neck pain and spasms  He was told to continue using Robaxin which had been prescribed by his PCP as well as Lidoderm patches  He has completed PT  He does have less sensation in the fingers however he is not dropping things out of the hands  He does have more trouble using the nail clipper than in the past    He uses the cane or walker  No recent falls  No issues with controlling bowel or bladder function  INITIAL HISTORY  Per records, he has seen Dr Bernardino Romero on 2/1/7/20 recently for evaluation of the indication, originally referred from PCP  The following is taken from earlier notes and confirmed by patient:      He has history of C5-C6 spinal stenosis, L3-L4 stenosis  He had cervical decompression in 2008  He has history of lumbar laminectomies as well  He has a 10+ year history of low back pain treated with epidural injections  At the time, he had radiating pain to the LLE  He reported good relief with epidurals for 2 years  He denies low back pain at rest, but they are exacerbated with standing or walking  He reports chronic imbalance, gait dysfunction even before his cervical spine surgery  He reports worsening of gait in the past few months  He tried seeing PT approximately 3 months ago but stopped after 1 appointment as he felt it was ineffective  Imaging:   - XR c-spine 9/12/2019 (per [facility] records): No fracture  There is anterior screw fixation extending from C3-C6  There are no previous studies  There is extensive carotid artery calcification  There is minimal impingement upon the neuroforamina at C4/C5 on the left  There are degenerative changes of the apophyseal joints of C2/C3 bilaterally  There is loss of the normal cervical lordosis at the level of C5/C6  C5 is slightly angulated compared to C6   - MRI lumbar spine 2013 (per [facility] records)  1  Since the prior MRI of 2007, the patient has undergone right lateral L5 laminectomies  The central canal stenosis at L4-5 has  decreased  2  Mild anterolisthesis of L4, increased since the prior study  No change in the bilateral L4-5 facet arthropathy   Increased neural  foraminal narrowing at L4-5 with compression of the exiting L4 nerve roots  3  Congenital central canal stenosis  - MRI Cervical spine 2013 (per [facility] records):   1  Increased bulging of the C7-T1 disc  Mild central canal stenosis at C7-T1  These are new findings  2  Mild central canal stenosis at C6-7  Neural foraminal narrowing on the left at C2-3 and C7-T1  and bilaterally from C3-4 through C6-7  These findings are unchanged  3  Status post anterior fusion from C3-C6   - MRI Lumbar Spine 3/2/2020:  Degenerative L4-L5, moderate severe bilateral foraminal stenosis and severe bilateral lateral recess stenosis at risk for L5 radiculitis  4 5cm aortic aneurysm    - MRI Cervical Spine 3/2/2020:  Cord compression at C2-C3 without corresponding signal changes, nonspecific narrowing beyond that  I personally reviewed and updated the ROS  Objective:    Blood pressure 144/78, pulse 61, temperature (!) 97 °F (36 1 °C), temperature source Temporal, height 5' 8" (1 727 m), weight 74 4 kg (164 lb)  Physical Exam  Constitutional:       Appearance: Normal appearance  HENT:      Right Ear: Hearing normal       Left Ear: Hearing normal    Eyes:      General: Lids are normal       Extraocular Movements: Extraocular movements intact  Pupils: Pupils are equal, round, and reactive to light  Pulmonary:      Effort: Pulmonary effort is normal    Neurological:      Mental Status: He is alert  Deep Tendon Reflexes:      Reflex Scores:       Bicep reflexes are 2+ on the right side and 2+ on the left side  Brachioradialis reflexes are 1+ on the left side  Psychiatric:         Speech: Speech normal          Neurological Exam  Mental Status  Alert  Oriented to person, place and time  Speech is normal     Cranial Nerves  CN III, IV, VI: Extraocular movements intact bilaterally  Normal lids and orbits bilaterally  Pupils equal round and reactive to light bilaterally    CN V:  Right: Facial sensation is normal   Left: Facial sensation is normal on the left  CN VIII:  Right: Hearing is normal   Left: Hearing is normal   CN XI: Shoulder shrug strength is normal   Patient wearing face mask  Motor   Strength is 5/5 in all four extremities except as noted  4+/5 bilateral tricep and bicep   Sensory  Light touch is normal in upper and lower extremities  Reflexes                                           Right                      Left  Brachioradialis                                           1+  Biceps                                 2+                         2+    Right pathological reflexes: Yoana's absent  Left pathological reflexes: Yoana's absent  Coordination  Right: Finger-to-nose normal   Left: Finger-to-nose normal     Gait  Casual gait:  Ambulating with cane  Shortened stride with the left leg with tendency to keep leg straight   ROS:    Review of Systems   Constitutional: Negative  Negative for appetite change and fever  HENT: Negative  Negative for hearing loss, tinnitus, trouble swallowing and voice change  Eyes: Negative  Negative for photophobia and pain  Respiratory: Negative  Negative for shortness of breath  Cardiovascular: Negative  Negative for palpitations  Gastrointestinal: Negative  Negative for nausea and vomiting  Endocrine: Negative  Negative for cold intolerance  Genitourinary: Negative  Negative for dysuria, frequency and urgency  Musculoskeletal: Positive for gait problem (difficulty walking), neck pain and neck stiffness  Negative for myalgias  Positive for muscle spasms on the neck     Skin: Negative  Negative for rash  Allergic/Immunologic: Negative  Neurological: Negative for dizziness, tremors, seizures, syncope, facial asymmetry, speech difficulty, weakness, light-headedness, numbness and headaches  Hematological: Negative  Does not bruise/bleed easily  Psychiatric/Behavioral: Negative    Negative for confusion, hallucinations and sleep disturbance

## 2020-09-02 NOTE — TELEPHONE ENCOUNTER
----- Message from Zach Richards sent at 9/2/2020  2:47 PM EDT -----  Hi James,     How are you? I hope you are well  This patient's wife had asked if you can get in touch with them to let them know about an upcoming parkinson's event       Thank you so much,  Zach Richards

## 2020-09-02 NOTE — PATIENT INSTRUCTIONS
Patient presents for follow-up regarding some chronic issues including cervical stenosis and neck pain/spasms  He had been following with Dr James Richards with physiatry however he is no longer outpatient and we discussed referral to [address] [place] [address] physiatry at this time  We also had a long discussion regarding his MRI of the cervical spine  We reviewed this in detail in the office today with both patient and his wife  We discussed the severity of the stenosis or tightening around the cervical cord at 2 levels within the spine  I also reviewed his previous neurosurgery note which also discuss this as well as discussed potential surgery for this  I advised that he contact the neurosurgeon to have further discussion regarding the risk versus benefit of having this surgery performed  In the interim I did discuss the importance of fall precautions and trying to prevent falls at this time  All questions were answered

## 2020-09-02 NOTE — ASSESSMENT & PLAN NOTE
Patient presents for follow-up regarding chronic issues including cervical stenosis and neck pain/spasms  He had been following with Dr Heather Edwards with physiatry however he is no longer outpatient and we discussed referral to [address] [place] [address] physiatry at this time  We also had a long discussion regarding his MRI of the cervical spine  We reviewed this in detail in the office today with both patient and his wife  We discussed the severity of the stenosis or tightening around the cervical cord at 2 levels within the spine  I also reviewed his previous neurosurgery note which discussed potential surgery for this  I advised that he contact the neurosurgeon to have further discussion regarding the risk versus benefit of having this surgery performed  In the interim I did discuss the importance of fall precautions and trying to avoid situations put him risk  All questions were answered

## 2020-09-04 NOTE — TELEPHONE ENCOUNTER
MSW was able to connect with patient's wife this date at [phone]  She shared that it is her who has the PD diagnosis  MSW answered questions that patient's wife had in regard upcoming programs  Please refer to patient's wife record for more information

## 2020-10-09 ENCOUNTER — TRANSCRIBE ORDERS (OUTPATIENT)
Dept: ADMINISTRATIVE | Facility: HOSPITAL | Age: 85
End: 2020-10-09

## 2020-10-09 DIAGNOSIS — M48.02 SPINAL STENOSIS, CERVICAL REGION: Primary | ICD-10-CM

## 2020-10-30 ENCOUNTER — HOSPITAL ENCOUNTER (OUTPATIENT)
Dept: RADIOLOGY | Age: 85
Discharge: HOME | End: 2020-10-30
Payer: COMMERCIAL

## 2020-10-30 DIAGNOSIS — M48.02 SPINAL STENOSIS, CERVICAL REGION: ICD-10-CM

## 2020-10-30 PROCEDURE — G1004 CDSM NDSC: HCPCS

## 2020-10-30 PROCEDURE — 72156 MRI NECK SPINE W/O & W/DYE: CPT

## 2020-10-30 PROCEDURE — A9585 GADOBUTROL INJECTION: HCPCS | Performed by: PHYSICAL MEDICINE & REHABILITATION

## 2020-10-30 RX ADMIN — GADOBUTROL 7 ML: 604.72 INJECTION INTRAVENOUS at 09:59

## 2020-11-05 ENCOUNTER — TELEPHONE (OUTPATIENT)
Dept: UROLOGY | Facility: MEDICAL CENTER | Age: 85
End: 2020-11-05

## 2020-11-05 DIAGNOSIS — N39.41 URGE INCONTINENCE OF URINE: ICD-10-CM

## 2020-11-05 DIAGNOSIS — R35.1 BENIGN PROSTATIC HYPERPLASIA WITH NOCTURIA: Primary | ICD-10-CM

## 2020-11-05 DIAGNOSIS — N40.1 BENIGN PROSTATIC HYPERPLASIA WITH NOCTURIA: Primary | ICD-10-CM

## 2020-11-06 ENCOUNTER — TELEPHONE (OUTPATIENT)
Dept: NEUROSURGERY | Facility: CLINIC | Age: 85
End: 2020-11-06

## 2020-11-06 ENCOUNTER — TRANSCRIBE ORDERS (OUTPATIENT)
Dept: NEUROSURGERY | Facility: CLINIC | Age: 85
End: 2020-11-06

## 2020-11-12 ENCOUNTER — TELEPHONE (OUTPATIENT)
Dept: UROLOGY | Facility: MEDICAL CENTER | Age: 85
End: 2020-11-12

## 2020-12-23 PROBLEM — M48.062 SPINAL STENOSIS OF LUMBAR REGION WITH NEUROGENIC CLAUDICATION: Status: ACTIVE | Noted: 2020-01-01

## 2020-12-23 PROBLEM — G89.29 CHRONIC BILATERAL LOW BACK PAIN WITHOUT SCIATICA: Status: ACTIVE | Noted: 2020-01-01

## 2020-12-23 PROBLEM — M51.26 HERNIATED NUCLEUS PULPOSUS, L4-5: Status: ACTIVE | Noted: 2020-01-01

## 2020-12-23 PROBLEM — M54.50 CHRONIC BILATERAL LOW BACK PAIN WITHOUT SCIATICA: Status: ACTIVE | Noted: 2020-01-01

## 2020-12-23 PROBLEM — G89.4 CHRONIC PAIN SYNDROME: Status: ACTIVE | Noted: 2020-01-01

## 2021-01-01 ENCOUNTER — APPOINTMENT (OUTPATIENT)
Dept: LAB | Facility: CLINIC | Age: 86
End: 2021-01-01
Payer: COMMERCIAL

## 2021-01-01 ENCOUNTER — TELEPHONE (OUTPATIENT)
Dept: UROLOGY | Facility: MEDICAL CENTER | Age: 86
End: 2021-01-01

## 2021-01-01 ENCOUNTER — TELEPHONE (OUTPATIENT)
Dept: HEMATOLOGY ONCOLOGY | Facility: CLINIC | Age: 86
End: 2021-01-01

## 2021-01-01 ENCOUNTER — TELEPHONE (OUTPATIENT)
Dept: SURGICAL ONCOLOGY | Facility: CLINIC | Age: 86
End: 2021-01-01

## 2021-01-01 ENCOUNTER — HOSPITAL ENCOUNTER (OUTPATIENT)
Dept: INFUSION CENTER | Facility: HOSPITAL | Age: 86
Discharge: HOME | End: 2021-08-06
Attending: INTERNAL MEDICINE
Payer: COMMERCIAL

## 2021-01-01 ENCOUNTER — PATIENT MESSAGE (OUTPATIENT)
Dept: PALLIATIVE MEDICINE | Facility: CLINIC | Age: 86
End: 2021-01-01

## 2021-01-01 ENCOUNTER — CONSULT (OUTPATIENT)
Dept: PALLIATIVE MEDICINE | Facility: CLINIC | Age: 86
End: 2021-01-01
Payer: COMMERCIAL

## 2021-01-01 ENCOUNTER — TELEPHONE (OUTPATIENT)
Dept: UROLOGY | Facility: AMBULATORY SURGERY CENTER | Age: 86
End: 2021-01-01

## 2021-01-01 ENCOUNTER — PATIENT OUTREACH (OUTPATIENT)
Dept: UROLOGY | Facility: AMBULATORY SURGERY CENTER | Age: 86
End: 2021-01-01

## 2021-01-01 ENCOUNTER — HOSPITAL ENCOUNTER (OUTPATIENT)
Dept: INFUSION CENTER | Facility: HOSPITAL | Age: 86
Discharge: HOME | End: 2021-10-07
Attending: INTERNAL MEDICINE
Payer: COMMERCIAL

## 2021-01-01 ENCOUNTER — OFFICE VISIT (OUTPATIENT)
Dept: HEMATOLOGY ONCOLOGY | Facility: CLINIC | Age: 86
End: 2021-01-01
Payer: COMMERCIAL

## 2021-01-01 ENCOUNTER — TELEPHONE (OUTPATIENT)
Dept: OTHER | Facility: OTHER | Age: 86
End: 2021-01-01

## 2021-01-01 ENCOUNTER — HOSPITAL ENCOUNTER (OUTPATIENT)
Dept: INFUSION CENTER | Facility: HOSPITAL | Age: 86
Discharge: HOME | End: 2021-07-16
Attending: INTERNAL MEDICINE
Payer: COMMERCIAL

## 2021-01-01 ENCOUNTER — OFFICE VISIT (OUTPATIENT)
Dept: UROLOGY | Facility: AMBULATORY SURGERY CENTER | Age: 86
End: 2021-01-01
Payer: COMMERCIAL

## 2021-01-01 ENCOUNTER — TELEPHONE (OUTPATIENT)
Dept: PAIN MEDICINE | Facility: CLINIC | Age: 86
End: 2021-01-01

## 2021-01-01 ENCOUNTER — HOSPITAL ENCOUNTER (OUTPATIENT)
Dept: RADIOLOGY | Facility: HOSPITAL | Age: 86
Discharge: HOME | End: 2021-06-30
Attending: UROLOGY
Payer: COMMERCIAL

## 2021-01-01 ENCOUNTER — CONSULT (OUTPATIENT)
Dept: HEMATOLOGY ONCOLOGY | Facility: CLINIC | Age: 86
End: 2021-01-01
Payer: COMMERCIAL

## 2021-01-01 ENCOUNTER — TELEMEDICINE (OUTPATIENT)
Dept: PALLIATIVE MEDICINE | Facility: CLINIC | Age: 86
End: 2021-01-01

## 2021-01-01 ENCOUNTER — PROCEDURE VISIT (OUTPATIENT)
Dept: UROLOGY | Facility: AMBULATORY SURGERY CENTER | Age: 86
End: 2021-01-01
Payer: COMMERCIAL

## 2021-01-01 ENCOUNTER — TELEPHONE (OUTPATIENT)
Dept: RADIATION ONCOLOGY | Facility: CLINIC | Age: 86
End: 2021-01-01

## 2021-01-01 ENCOUNTER — PATIENT OUTREACH (OUTPATIENT)
Dept: UROLOGY | Facility: CLINIC | Age: 86
End: 2021-01-01

## 2021-01-01 ENCOUNTER — TRANSCRIBE ORDERS (OUTPATIENT)
Dept: ADMINISTRATIVE | Facility: HOSPITAL | Age: 86
End: 2021-01-01

## 2021-01-01 ENCOUNTER — TELEPHONE (OUTPATIENT)
Dept: PALLIATIVE MEDICINE | Facility: CLINIC | Age: 86
End: 2021-01-01

## 2021-01-01 ENCOUNTER — TELEPHONE (OUTPATIENT)
Dept: RADIOLOGY | Facility: CLINIC | Age: 86
End: 2021-01-01

## 2021-01-01 ENCOUNTER — OFFICE VISIT (OUTPATIENT)
Dept: PALLIATIVE MEDICINE | Facility: CLINIC | Age: 86
End: 2021-01-01
Payer: COMMERCIAL

## 2021-01-01 ENCOUNTER — TELEPHONE (OUTPATIENT)
Dept: NUTRITION | Facility: CLINIC | Age: 86
End: 2021-01-01

## 2021-01-01 ENCOUNTER — PATIENT OUTREACH (OUTPATIENT)
Dept: CASE MANAGEMENT | Facility: HOSPITAL | Age: 86
End: 2021-01-01

## 2021-01-01 ENCOUNTER — HOSPITAL ENCOUNTER (OUTPATIENT)
Facility: HOSPITAL | Age: 86
Setting detail: OBSERVATION
Discharge: HOME | End: 2021-09-27
Attending: EMERGENCY MEDICINE | Admitting: INTERNAL MEDICINE
Payer: COMMERCIAL

## 2021-01-01 ENCOUNTER — DOCUMENTATION (OUTPATIENT)
Dept: HEMATOLOGY ONCOLOGY | Facility: CLINIC | Age: 86
End: 2021-01-01

## 2021-01-01 ENCOUNTER — HOSPITAL ENCOUNTER (OUTPATIENT)
Dept: RADIOLOGY | Facility: HOSPITAL | Age: 86
Discharge: HOME | End: 2021-07-26
Attending: RADIOLOGY | Admitting: RADIOLOGY
Payer: COMMERCIAL

## 2021-01-01 ENCOUNTER — ANESTHESIA (OUTPATIENT)
Dept: PERIOP | Facility: HOSPITAL | Age: 86
End: 2021-01-01
Payer: COMMERCIAL

## 2021-01-01 ENCOUNTER — IMMUNIZATIONS (OUTPATIENT)
Dept: FAMILY MEDICINE CLINIC | Facility: HOSPITAL | Age: 86
End: 2021-01-01

## 2021-01-01 ENCOUNTER — PREP FOR PROCEDURE (OUTPATIENT)
Dept: INTERVENTIONAL RADIOLOGY/VASCULAR | Facility: CLINIC | Age: 86
End: 2021-01-01

## 2021-01-01 ENCOUNTER — HOSPITAL ENCOUNTER (INPATIENT)
Facility: HOSPITAL | Age: 86
LOS: 2 days | Discharge: HOME | DRG: 644 | End: 2021-10-01
Attending: EMERGENCY MEDICINE
Payer: COMMERCIAL

## 2021-01-01 ENCOUNTER — HOSPITAL ENCOUNTER (OUTPATIENT)
Dept: INFUSION CENTER | Facility: HOSPITAL | Age: 86
Discharge: HOME | End: 2021-08-27
Attending: INTERNAL MEDICINE
Payer: COMMERCIAL

## 2021-01-01 ENCOUNTER — HOSPITAL ENCOUNTER (OUTPATIENT)
Dept: RADIOLOGY | Facility: HOSPITAL | Age: 86
Discharge: HOME | End: 2021-02-11
Payer: COMMERCIAL

## 2021-01-01 ENCOUNTER — TELEPHONE (OUTPATIENT)
Dept: UROLOGY | Facility: HOSPITAL | Age: 86
End: 2021-01-01

## 2021-01-01 ENCOUNTER — HOSPITAL ENCOUNTER (OUTPATIENT)
Dept: RADIOLOGY | Facility: CLINIC | Age: 86
Discharge: HOME | End: 2021-01-21
Attending: ANESTHESIOLOGY | Admitting: ANESTHESIOLOGY
Payer: COMMERCIAL

## 2021-01-01 ENCOUNTER — PREP FOR PROCEDURE (OUTPATIENT)
Dept: UROLOGY | Facility: AMBULATORY SURGERY CENTER | Age: 86
End: 2021-01-01

## 2021-01-01 ENCOUNTER — TELEPHONE (OUTPATIENT)
Dept: PHYSICAL THERAPY | Facility: OTHER | Age: 86
End: 2021-01-01

## 2021-01-01 ENCOUNTER — HOSPITAL ENCOUNTER (OUTPATIENT)
Dept: INFUSION CENTER | Facility: HOSPITAL | Age: 86
Discharge: HOME | End: 2021-09-17
Attending: INTERNAL MEDICINE
Payer: COMMERCIAL

## 2021-01-01 ENCOUNTER — HOSPITAL ENCOUNTER (OUTPATIENT)
Facility: HOSPITAL | Age: 86
Setting detail: OUTPATIENT SURGERY
Discharge: HOME | End: 2021-06-07
Attending: UROLOGY | Admitting: UROLOGY
Payer: COMMERCIAL

## 2021-01-01 ENCOUNTER — ANESTHESIA EVENT (OUTPATIENT)
Dept: PERIOP | Facility: HOSPITAL | Age: 86
End: 2021-01-01
Payer: COMMERCIAL

## 2021-01-01 ENCOUNTER — TELEPHONE (OUTPATIENT)
Dept: OTHER | Facility: HOSPITAL | Age: 86
End: 2021-01-01

## 2021-01-01 ENCOUNTER — HOSPITAL ENCOUNTER (OUTPATIENT)
Dept: CT IMAGING | Facility: HOSPITAL | Age: 86
Discharge: HOME | End: 2021-09-21
Payer: COMMERCIAL

## 2021-01-01 ENCOUNTER — APPOINTMENT (EMERGENCY)
Dept: RADIOLOGY | Facility: HOSPITAL | Age: 86
End: 2021-01-01
Payer: COMMERCIAL

## 2021-01-01 ENCOUNTER — HOSPITAL ENCOUNTER (OUTPATIENT)
Dept: RADIOLOGY | Facility: CLINIC | Age: 86
Discharge: HOME | End: 2021-01-04
Attending: ANESTHESIOLOGY | Admitting: ANESTHESIOLOGY
Payer: COMMERCIAL

## 2021-01-01 ENCOUNTER — TELEMEDICINE (OUTPATIENT)
Dept: PAIN MEDICINE | Facility: CLINIC | Age: 86
End: 2021-01-01
Payer: COMMERCIAL

## 2021-01-01 VITALS
DIASTOLIC BLOOD PRESSURE: 69 MMHG | HEART RATE: 83 BPM | TEMPERATURE: 97.6 F | SYSTOLIC BLOOD PRESSURE: 138 MMHG | BODY MASS INDEX: 22.32 KG/M2 | WEIGHT: 146.83 LBS | RESPIRATION RATE: 18 BRPM

## 2021-01-01 VITALS
TEMPERATURE: 98 F | HEIGHT: 68 IN | WEIGHT: 148 LBS | OXYGEN SATURATION: 98 % | RESPIRATION RATE: 20 BRPM | HEART RATE: 69 BPM | SYSTOLIC BLOOD PRESSURE: 139 MMHG | BODY MASS INDEX: 22.43 KG/M2 | DIASTOLIC BLOOD PRESSURE: 63 MMHG

## 2021-01-01 VITALS
TEMPERATURE: 97.8 F | SYSTOLIC BLOOD PRESSURE: 153 MMHG | HEART RATE: 91 BPM | RESPIRATION RATE: 18 BRPM | DIASTOLIC BLOOD PRESSURE: 64 MMHG | OXYGEN SATURATION: 98 % | WEIGHT: 150.13 LBS | BODY MASS INDEX: 27.46 KG/M2

## 2021-01-01 VITALS
RESPIRATION RATE: 18 BRPM | BODY MASS INDEX: 22.43 KG/M2 | TEMPERATURE: 98.4 F | OXYGEN SATURATION: 98 % | WEIGHT: 148 LBS | HEIGHT: 68 IN | SYSTOLIC BLOOD PRESSURE: 112 MMHG | HEART RATE: 103 BPM | DIASTOLIC BLOOD PRESSURE: 72 MMHG

## 2021-01-01 VITALS
TEMPERATURE: 97.7 F | TEMPERATURE: 97.9 F | WEIGHT: 148.59 LBS | DIASTOLIC BLOOD PRESSURE: 76 MMHG | OXYGEN SATURATION: 97 % | DIASTOLIC BLOOD PRESSURE: 70 MMHG | BODY MASS INDEX: 26.5 KG/M2 | HEART RATE: 78 BPM | SYSTOLIC BLOOD PRESSURE: 167 MMHG | SYSTOLIC BLOOD PRESSURE: 157 MMHG | WEIGHT: 144 LBS | RESPIRATION RATE: 15 BRPM | HEART RATE: 74 BPM | BODY MASS INDEX: 27.18 KG/M2 | HEIGHT: 62 IN | RESPIRATION RATE: 20 BRPM

## 2021-01-01 VITALS
SYSTOLIC BLOOD PRESSURE: 182 MMHG | RESPIRATION RATE: 18 BRPM | WEIGHT: 149.69 LBS | DIASTOLIC BLOOD PRESSURE: 76 MMHG | TEMPERATURE: 97.8 F | HEART RATE: 76 BPM | OXYGEN SATURATION: 93 % | HEIGHT: 68 IN | BODY MASS INDEX: 22.69 KG/M2

## 2021-01-01 VITALS
DIASTOLIC BLOOD PRESSURE: 63 MMHG | RESPIRATION RATE: 20 BRPM | TEMPERATURE: 97 F | SYSTOLIC BLOOD PRESSURE: 144 MMHG | HEART RATE: 62 BPM | OXYGEN SATURATION: 96 %

## 2021-01-01 VITALS
HEART RATE: 79 BPM | OXYGEN SATURATION: 99 % | SYSTOLIC BLOOD PRESSURE: 122 MMHG | TEMPERATURE: 96.7 F | HEIGHT: 62 IN | WEIGHT: 149 LBS | DIASTOLIC BLOOD PRESSURE: 76 MMHG | BODY MASS INDEX: 27.42 KG/M2 | RESPIRATION RATE: 18 BRPM

## 2021-01-01 VITALS
DIASTOLIC BLOOD PRESSURE: 78 MMHG | SYSTOLIC BLOOD PRESSURE: 150 MMHG | BODY MASS INDEX: 22.66 KG/M2 | HEIGHT: 68 IN | HEART RATE: 80 BPM

## 2021-01-01 VITALS
HEART RATE: 97 BPM | OXYGEN SATURATION: 98 % | WEIGHT: 146.61 LBS | DIASTOLIC BLOOD PRESSURE: 62 MMHG | SYSTOLIC BLOOD PRESSURE: 118 MMHG | TEMPERATURE: 97.4 F | BODY MASS INDEX: 22.29 KG/M2 | RESPIRATION RATE: 16 BRPM

## 2021-01-01 VITALS
HEIGHT: 68 IN | BODY MASS INDEX: 22.58 KG/M2 | SYSTOLIC BLOOD PRESSURE: 160 MMHG | DIASTOLIC BLOOD PRESSURE: 80 MMHG | TEMPERATURE: 98 F | RESPIRATION RATE: 17 BRPM | HEART RATE: 78 BPM | OXYGEN SATURATION: 98 % | WEIGHT: 149 LBS

## 2021-01-01 VITALS
SYSTOLIC BLOOD PRESSURE: 96 MMHG | RESPIRATION RATE: 18 BRPM | DIASTOLIC BLOOD PRESSURE: 71 MMHG | TEMPERATURE: 97.1 F | HEART RATE: 78 BPM

## 2021-01-01 VITALS
SYSTOLIC BLOOD PRESSURE: 132 MMHG | BODY MASS INDEX: 25.31 KG/M2 | RESPIRATION RATE: 18 BRPM | WEIGHT: 167 LBS | HEART RATE: 89 BPM | OXYGEN SATURATION: 97 % | DIASTOLIC BLOOD PRESSURE: 62 MMHG | HEIGHT: 62 IN | SYSTOLIC BLOOD PRESSURE: 118 MMHG | WEIGHT: 149.5 LBS | HEIGHT: 68 IN | HEART RATE: 62 BPM | BODY MASS INDEX: 27.51 KG/M2 | DIASTOLIC BLOOD PRESSURE: 58 MMHG | TEMPERATURE: 97.4 F

## 2021-01-01 VITALS
TEMPERATURE: 97 F | TEMPERATURE: 98.6 F | WEIGHT: 153 LBS | BODY MASS INDEX: 23.19 KG/M2 | WEIGHT: 150.5 LBS | HEART RATE: 101 BPM | OXYGEN SATURATION: 99 % | DIASTOLIC BLOOD PRESSURE: 72 MMHG | SYSTOLIC BLOOD PRESSURE: 167 MMHG | HEIGHT: 68 IN | SYSTOLIC BLOOD PRESSURE: 122 MMHG | RESPIRATION RATE: 18 BRPM | HEIGHT: 68 IN | DIASTOLIC BLOOD PRESSURE: 70 MMHG | OXYGEN SATURATION: 98 % | HEART RATE: 88 BPM | RESPIRATION RATE: 18 BRPM | BODY MASS INDEX: 22.81 KG/M2

## 2021-01-01 VITALS
SYSTOLIC BLOOD PRESSURE: 138 MMHG | HEIGHT: 62 IN | DIASTOLIC BLOOD PRESSURE: 70 MMHG | OXYGEN SATURATION: 97 % | BODY MASS INDEX: 27.6 KG/M2 | HEART RATE: 102 BPM | RESPIRATION RATE: 20 BRPM | TEMPERATURE: 97.1 F | WEIGHT: 150 LBS

## 2021-01-01 VITALS
DIASTOLIC BLOOD PRESSURE: 62 MMHG | BODY MASS INDEX: 27.33 KG/M2 | SYSTOLIC BLOOD PRESSURE: 112 MMHG | WEIGHT: 148.5 LBS | OXYGEN SATURATION: 97 % | RESPIRATION RATE: 18 BRPM | HEART RATE: 75 BPM | HEIGHT: 62 IN

## 2021-01-01 VITALS
DIASTOLIC BLOOD PRESSURE: 56 MMHG | RESPIRATION RATE: 20 BRPM | TEMPERATURE: 97.6 F | HEART RATE: 65 BPM | SYSTOLIC BLOOD PRESSURE: 99 MMHG

## 2021-01-01 VITALS
TEMPERATURE: 97.5 F | DIASTOLIC BLOOD PRESSURE: 67 MMHG | HEART RATE: 61 BPM | OXYGEN SATURATION: 95 % | SYSTOLIC BLOOD PRESSURE: 161 MMHG | RESPIRATION RATE: 20 BRPM

## 2021-01-01 VITALS
SYSTOLIC BLOOD PRESSURE: 120 MMHG | HEIGHT: 68 IN | WEIGHT: 150 LBS | BODY MASS INDEX: 22.73 KG/M2 | DIASTOLIC BLOOD PRESSURE: 74 MMHG | HEART RATE: 104 BPM

## 2021-01-01 VITALS
TEMPERATURE: 97.3 F | HEART RATE: 72 BPM | DIASTOLIC BLOOD PRESSURE: 60 MMHG | BODY MASS INDEX: 22.2 KG/M2 | WEIGHT: 146 LBS | RESPIRATION RATE: 20 BRPM | SYSTOLIC BLOOD PRESSURE: 112 MMHG

## 2021-01-01 VITALS — DIASTOLIC BLOOD PRESSURE: 62 MMHG | SYSTOLIC BLOOD PRESSURE: 112 MMHG | HEART RATE: 68 BPM

## 2021-01-01 DIAGNOSIS — N17.9 ACUTE KIDNEY INJURY (HCC): ICD-10-CM

## 2021-01-01 DIAGNOSIS — R63.0 LOSS OF APPETITE: ICD-10-CM

## 2021-01-01 DIAGNOSIS — G89.29 CHRONIC BILATERAL LOW BACK PAIN WITHOUT SCIATICA: ICD-10-CM

## 2021-01-01 DIAGNOSIS — G47.01 INSOMNIA DUE TO MEDICAL CONDITION: ICD-10-CM

## 2021-01-01 DIAGNOSIS — C34.12 MALIGNANT NEOPLASM OF UPPER LOBE OF LEFT LUNG (HCC): ICD-10-CM

## 2021-01-01 DIAGNOSIS — Z79.899 MEDICAL MARIJUANA USE: ICD-10-CM

## 2021-01-01 DIAGNOSIS — C67.9 MALIGNANT NEOPLASM OF URINARY BLADDER, UNSPECIFIED SITE (HCC): ICD-10-CM

## 2021-01-01 DIAGNOSIS — R33.9 URINARY RETENTION: Primary | ICD-10-CM

## 2021-01-01 DIAGNOSIS — R68.83 CHILLS: ICD-10-CM

## 2021-01-01 DIAGNOSIS — M54.50 CHRONIC BILATERAL LOW BACK PAIN WITHOUT SCIATICA: ICD-10-CM

## 2021-01-01 DIAGNOSIS — E87.1 HYPONATREMIA: ICD-10-CM

## 2021-01-01 DIAGNOSIS — E46 PROTEIN-CALORIE MALNUTRITION, UNSPECIFIED SEVERITY (HCC): ICD-10-CM

## 2021-01-01 DIAGNOSIS — E43 SEVERE PROTEIN-CALORIE MALNUTRITION (HCC): ICD-10-CM

## 2021-01-01 DIAGNOSIS — Z71.89 GOALS OF CARE, COUNSELING/DISCUSSION: ICD-10-CM

## 2021-01-01 DIAGNOSIS — N39.0 RECURRENT URINARY TRACT INFECTION: Primary | ICD-10-CM

## 2021-01-01 DIAGNOSIS — R53.0 NEOPLASTIC MALIGNANT RELATED FATIGUE: ICD-10-CM

## 2021-01-01 DIAGNOSIS — G89.4 CHRONIC PAIN SYNDROME: ICD-10-CM

## 2021-01-01 DIAGNOSIS — D64.9 ANEMIA, UNSPECIFIED TYPE: ICD-10-CM

## 2021-01-01 DIAGNOSIS — D50.0 IRON DEFICIENCY ANEMIA DUE TO CHRONIC BLOOD LOSS: ICD-10-CM

## 2021-01-01 DIAGNOSIS — C67.8 MALIGNANT NEOPLASM OF OVERLAPPING SITES OF BLADDER (HCC): ICD-10-CM

## 2021-01-01 DIAGNOSIS — M54.16 LUMBAR RADICULITIS: ICD-10-CM

## 2021-01-01 DIAGNOSIS — C67.8 MALIGNANT NEOPLASM OF OVERLAPPING SITES OF BLADDER (HCC): Primary | ICD-10-CM

## 2021-01-01 DIAGNOSIS — C34.12 MALIGNANT NEOPLASM OF UPPER LOBE OF LEFT LUNG (HCC): Primary | ICD-10-CM

## 2021-01-01 DIAGNOSIS — R64 MALIGNANT CACHEXIA (HCC): ICD-10-CM

## 2021-01-01 DIAGNOSIS — R45.89 DYSPHORIC MOOD: ICD-10-CM

## 2021-01-01 DIAGNOSIS — M96.1 LUMBAR POST-LAMINECTOMY SYNDROME: ICD-10-CM

## 2021-01-01 DIAGNOSIS — R31.9 URINARY TRACT INFECTION WITH HEMATURIA, SITE UNSPECIFIED: Primary | ICD-10-CM

## 2021-01-01 DIAGNOSIS — C67.9 MALIGNANT NEOPLASM OF URINARY BLADDER, UNSPECIFIED SITE (HCC): Primary | ICD-10-CM

## 2021-01-01 DIAGNOSIS — N39.0 URINARY TRACT INFECTION WITHOUT HEMATURIA, SITE UNSPECIFIED: Primary | ICD-10-CM

## 2021-01-01 DIAGNOSIS — Z71.89 ADVANCED CARE PLANNING/COUNSELING DISCUSSION: ICD-10-CM

## 2021-01-01 DIAGNOSIS — Z51.5 PALLIATIVE CARE PATIENT: ICD-10-CM

## 2021-01-01 DIAGNOSIS — R31.0 GROSS HEMATURIA: Primary | ICD-10-CM

## 2021-01-01 DIAGNOSIS — M48.062 SPINAL STENOSIS OF LUMBAR REGION WITH NEUROGENIC CLAUDICATION: ICD-10-CM

## 2021-01-01 DIAGNOSIS — F41.9 ANXIOUSNESS: ICD-10-CM

## 2021-01-01 DIAGNOSIS — M54.2 ACUTE NECK PAIN: ICD-10-CM

## 2021-01-01 DIAGNOSIS — N32.89 BLADDER MASS: Primary | ICD-10-CM

## 2021-01-01 DIAGNOSIS — R30.0 DYSURIA: ICD-10-CM

## 2021-01-01 DIAGNOSIS — M51.26 HERNIATED NUCLEUS PULPOSUS, L4-5: ICD-10-CM

## 2021-01-01 DIAGNOSIS — D64.9 ANEMIA: ICD-10-CM

## 2021-01-01 DIAGNOSIS — Z23 ENCOUNTER FOR IMMUNIZATION: Primary | ICD-10-CM

## 2021-01-01 DIAGNOSIS — R31.0 GROSS HEMATURIA: ICD-10-CM

## 2021-01-01 DIAGNOSIS — R30.0 BURNING WITH URINATION: ICD-10-CM

## 2021-01-01 DIAGNOSIS — R35.1 BENIGN PROSTATIC HYPERPLASIA WITH NOCTURIA: ICD-10-CM

## 2021-01-01 DIAGNOSIS — R53.0 NEOPLASTIC MALIGNANT RELATED FATIGUE: Primary | ICD-10-CM

## 2021-01-01 DIAGNOSIS — T40.2X5A THERAPEUTIC OPIOID-INDUCED CONSTIPATION (OIC): Primary | ICD-10-CM

## 2021-01-01 DIAGNOSIS — R91.8 LUNG MASS: ICD-10-CM

## 2021-01-01 DIAGNOSIS — M48.02 CERVICAL SPINAL STENOSIS: ICD-10-CM

## 2021-01-01 DIAGNOSIS — L30.8 DERMATITIS ASSOCIATED WITH MOISTURE: Primary | ICD-10-CM

## 2021-01-01 DIAGNOSIS — N39.0 RECURRENT URINARY TRACT INFECTION: ICD-10-CM

## 2021-01-01 DIAGNOSIS — Z71.89 COUNSELING AND COORDINATION OF CARE: Primary | ICD-10-CM

## 2021-01-01 DIAGNOSIS — M54.16 LUMBAR RADICULITIS: Primary | ICD-10-CM

## 2021-01-01 DIAGNOSIS — K59.03 THERAPEUTIC OPIOID-INDUCED CONSTIPATION (OIC): Primary | ICD-10-CM

## 2021-01-01 DIAGNOSIS — D50.0 IRON DEFICIENCY ANEMIA DUE TO CHRONIC BLOOD LOSS: Primary | ICD-10-CM

## 2021-01-01 DIAGNOSIS — R91.8 LUNG MASS: Primary | ICD-10-CM

## 2021-01-01 DIAGNOSIS — N30.00 ACUTE CYSTITIS WITHOUT HEMATURIA: ICD-10-CM

## 2021-01-01 DIAGNOSIS — N39.0 UTI (URINARY TRACT INFECTION): Primary | ICD-10-CM

## 2021-01-01 DIAGNOSIS — R25.2 CRAMP IN MUSCLE: ICD-10-CM

## 2021-01-01 DIAGNOSIS — E22.2 SIADH (SYNDROME OF INAPPROPRIATE ADH PRODUCTION) (HCC): ICD-10-CM

## 2021-01-01 DIAGNOSIS — N39.0 URINARY TRACT INFECTION WITHOUT HEMATURIA, SITE UNSPECIFIED: ICD-10-CM

## 2021-01-01 DIAGNOSIS — N32.89 BLADDER MASS: ICD-10-CM

## 2021-01-01 DIAGNOSIS — R10.30 LOWER ABDOMINAL PAIN: ICD-10-CM

## 2021-01-01 DIAGNOSIS — N40.1 BENIGN PROSTATIC HYPERPLASIA WITH NOCTURIA: ICD-10-CM

## 2021-01-01 DIAGNOSIS — N39.0 URINARY TRACT INFECTION WITH HEMATURIA, SITE UNSPECIFIED: Primary | ICD-10-CM

## 2021-01-01 DIAGNOSIS — Z78.9 NEED FOR FOLLOW-UP BY SOCIAL WORKER: ICD-10-CM

## 2021-01-01 DIAGNOSIS — R39.15 URINARY URGENCY: ICD-10-CM

## 2021-01-01 DIAGNOSIS — R33.9 URINARY RETENTION: ICD-10-CM

## 2021-01-01 DIAGNOSIS — R25.2 CRAMP IN MUSCLE: Primary | ICD-10-CM

## 2021-01-01 DIAGNOSIS — R30.0 BURNING WITH URINATION: Primary | ICD-10-CM

## 2021-01-01 DIAGNOSIS — R39.198 DIFFICULTY URINATING: Primary | ICD-10-CM

## 2021-01-01 DIAGNOSIS — Z01.810 PRE-OPERATIVE CARDIOVASCULAR EXAMINATION: ICD-10-CM

## 2021-01-01 DIAGNOSIS — R53.1 GENERALIZED WEAKNESS: ICD-10-CM

## 2021-01-01 LAB
ABO GROUP BLD BPU: NORMAL
ABO GROUP BLD: NORMAL
ABO GROUP BLD: NORMAL
ALBUMIN SERPL BCP-MCNC: 2.4 G/DL (ref 3.5–5)
ALBUMIN SERPL BCP-MCNC: 2.6 G/DL (ref 3.5–5)
ALBUMIN SERPL BCP-MCNC: 2.6 G/DL (ref 3.5–5)
ALBUMIN SERPL BCP-MCNC: 2.7 G/DL (ref 3.5–5)
ALBUMIN SERPL BCP-MCNC: 2.7 G/DL (ref 3.5–5)
ALBUMIN SERPL BCP-MCNC: 2.8 G/DL (ref 3.5–5)
ALBUMIN SERPL BCP-MCNC: 2.9 G/DL (ref 3.5–5)
ALBUMIN SERPL BCP-MCNC: 3 G/DL (ref 3.5–5)
ALP SERPL-CCNC: 111 U/L (ref 46–116)
ALP SERPL-CCNC: 66 U/L (ref 46–116)
ALP SERPL-CCNC: 70 U/L (ref 46–116)
ALP SERPL-CCNC: 74 U/L (ref 46–116)
ALP SERPL-CCNC: 76 U/L (ref 46–116)
ALP SERPL-CCNC: 79 U/L (ref 46–116)
ALP SERPL-CCNC: 84 U/L (ref 46–116)
ALP SERPL-CCNC: 86 U/L (ref 46–116)
ALT SERPL W P-5'-P-CCNC: 20 U/L (ref 12–78)
ALT SERPL W P-5'-P-CCNC: 23 U/L (ref 12–78)
ALT SERPL W P-5'-P-CCNC: 24 U/L (ref 12–78)
ALT SERPL W P-5'-P-CCNC: 29 U/L (ref 12–78)
ALT SERPL W P-5'-P-CCNC: 32 U/L (ref 12–78)
ANION GAP SERPL CALCULATED.3IONS-SCNC: 3 MMOL/L (ref 4–13)
ANION GAP SERPL CALCULATED.3IONS-SCNC: 5 MMOL/L (ref 4–13)
ANION GAP SERPL CALCULATED.3IONS-SCNC: 6 MMOL/L (ref 4–13)
ANION GAP SERPL CALCULATED.3IONS-SCNC: 6 MMOL/L (ref 4–13)
ANION GAP SERPL CALCULATED.3IONS-SCNC: 7 MMOL/L (ref 4–13)
ANION GAP SERPL CALCULATED.3IONS-SCNC: 8 MMOL/L (ref 4–13)
ANION GAP SERPL CALCULATED.3IONS-SCNC: 9 MMOL/L (ref 4–13)
APTT PPP: 38 SECONDS (ref 23–37)
AST SERPL W P-5'-P-CCNC: 19 U/L (ref 5–45)
AST SERPL W P-5'-P-CCNC: 21 U/L (ref 5–45)
AST SERPL W P-5'-P-CCNC: 21 U/L (ref 5–45)
AST SERPL W P-5'-P-CCNC: 22 U/L (ref 5–45)
AST SERPL W P-5'-P-CCNC: 24 U/L (ref 5–45)
AST SERPL W P-5'-P-CCNC: 25 U/L (ref 5–45)
AST SERPL W P-5'-P-CCNC: 29 U/L (ref 5–45)
AST SERPL W P-5'-P-CCNC: 38 U/L (ref 5–45)
ATRIAL RATE: 70 BPM
ATRIAL RATE: 86 BPM
BACTERIA BLD CULT: NORMAL
BACTERIA BLD CULT: NORMAL
BACTERIA UR CULT: ABNORMAL
BACTERIA UR CULT: NORMAL
BACTERIA UR QL AUTO: ABNORMAL /HPF
BASOPHILS # BLD AUTO: 0.02 THOUSANDS/ΜL (ref 0–0.1)
BASOPHILS # BLD AUTO: 0.03 THOUSANDS/ΜL (ref 0–0.1)
BASOPHILS # BLD AUTO: 0.05 THOUSANDS/ΜL (ref 0–0.1)
BASOPHILS # BLD AUTO: 0.06 THOUSANDS/ΜL (ref 0–0.1)
BASOPHILS NFR BLD AUTO: 0 % (ref 0–1)
BASOPHILS NFR BLD AUTO: 1 % (ref 0–1)
BASOPHILS NFR BLD AUTO: 1 % (ref 0–1)
BILIRUB SERPL-MCNC: 0.2 MG/DL (ref 0.2–1)
BILIRUB SERPL-MCNC: 0.26 MG/DL (ref 0.2–1)
BILIRUB SERPL-MCNC: 0.27 MG/DL (ref 0.2–1)
BILIRUB SERPL-MCNC: 0.32 MG/DL (ref 0.2–1)
BILIRUB SERPL-MCNC: 0.34 MG/DL (ref 0.2–1)
BILIRUB SERPL-MCNC: 0.4 MG/DL (ref 0.2–1)
BILIRUB SERPL-MCNC: 0.49 MG/DL (ref 0.2–1)
BILIRUB SERPL-MCNC: 0.57 MG/DL (ref 0.2–1)
BILIRUB UR QL STRIP: NEGATIVE
BLD GP AB SCN SERPL QL: NEGATIVE
BPU ID: NORMAL
BUN SERPL-MCNC: 21 MG/DL (ref 5–25)
BUN SERPL-MCNC: 21 MG/DL (ref 5–25)
BUN SERPL-MCNC: 23 MG/DL (ref 5–25)
BUN SERPL-MCNC: 23 MG/DL (ref 5–25)
BUN SERPL-MCNC: 24 MG/DL (ref 5–25)
BUN SERPL-MCNC: 24 MG/DL (ref 5–25)
BUN SERPL-MCNC: 25 MG/DL (ref 5–25)
BUN SERPL-MCNC: 25 MG/DL (ref 5–25)
BUN SERPL-MCNC: 26 MG/DL (ref 5–25)
BUN SERPL-MCNC: 27 MG/DL (ref 5–25)
BUN SERPL-MCNC: 28 MG/DL (ref 5–25)
BUN SERPL-MCNC: 29 MG/DL (ref 5–25)
BUN SERPL-MCNC: 30 MG/DL (ref 5–25)
CALCIUM ALBUM COR SERPL-MCNC: 10 MG/DL (ref 8.3–10.1)
CALCIUM ALBUM COR SERPL-MCNC: 10 MG/DL (ref 8.3–10.1)
CALCIUM ALBUM COR SERPL-MCNC: 10.1 MG/DL (ref 8.3–10.1)
CALCIUM ALBUM COR SERPL-MCNC: 10.4 MG/DL (ref 8.3–10.1)
CALCIUM ALBUM COR SERPL-MCNC: 10.6 MG/DL (ref 8.3–10.1)
CALCIUM ALBUM COR SERPL-MCNC: 10.6 MG/DL (ref 8.3–10.1)
CALCIUM ALBUM COR SERPL-MCNC: 11.2 MG/DL (ref 8.3–10.1)
CALCIUM ALBUM COR SERPL-MCNC: 9.7 MG/DL (ref 8.3–10.1)
CALCIUM SERPL-MCNC: 10.1 MG/DL (ref 8.3–10.1)
CALCIUM SERPL-MCNC: 8.4 MG/DL (ref 8.3–10.1)
CALCIUM SERPL-MCNC: 8.5 MG/DL (ref 8.3–10.1)
CALCIUM SERPL-MCNC: 8.6 MG/DL (ref 8.3–10.1)
CALCIUM SERPL-MCNC: 8.7 MG/DL (ref 8.3–10.1)
CALCIUM SERPL-MCNC: 8.7 MG/DL (ref 8.3–10.1)
CALCIUM SERPL-MCNC: 8.8 MG/DL (ref 8.3–10.1)
CALCIUM SERPL-MCNC: 8.9 MG/DL (ref 8.3–10.1)
CALCIUM SERPL-MCNC: 9 MG/DL (ref 8.3–10.1)
CALCIUM SERPL-MCNC: 9.2 MG/DL (ref 8.3–10.1)
CALCIUM SERPL-MCNC: 9.5 MG/DL (ref 8.3–10.1)
CALCIUM SERPL-MCNC: 9.6 MG/DL (ref 8.3–10.1)
CALCIUM SERPL-MCNC: 9.6 MG/DL (ref 8.3–10.1)
CAOX CRY URNS QL MICRO: ABNORMAL /HPF
CHLORIDE SERPL-SCNC: 100 MMOL/L (ref 100–108)
CHLORIDE SERPL-SCNC: 106 MMOL/L (ref 100–108)
CHLORIDE SERPL-SCNC: 91 MMOL/L (ref 100–108)
CHLORIDE SERPL-SCNC: 93 MMOL/L (ref 100–108)
CHLORIDE SERPL-SCNC: 94 MMOL/L (ref 100–108)
CHLORIDE SERPL-SCNC: 94 MMOL/L (ref 100–108)
CHLORIDE SERPL-SCNC: 96 MMOL/L (ref 100–108)
CHLORIDE SERPL-SCNC: 96 MMOL/L (ref 100–108)
CHLORIDE SERPL-SCNC: 97 MMOL/L (ref 100–108)
CHLORIDE SERPL-SCNC: 97 MMOL/L (ref 100–108)
CHLORIDE SERPL-SCNC: 98 MMOL/L (ref 100–108)
CHLORIDE SERPL-SCNC: 98 MMOL/L (ref 100–108)
CHLORIDE SERPL-SCNC: 99 MMOL/L (ref 100–108)
CLARITY UR: ABNORMAL
CO2 SERPL-SCNC: 22 MMOL/L (ref 21–32)
CO2 SERPL-SCNC: 23 MMOL/L (ref 21–32)
CO2 SERPL-SCNC: 24 MMOL/L (ref 21–32)
CO2 SERPL-SCNC: 24 MMOL/L (ref 21–32)
CO2 SERPL-SCNC: 25 MMOL/L (ref 21–32)
COLOR UR: ABNORMAL
COLOR UR: ABNORMAL
COLOR UR: YELLOW
CREAT SERPL-MCNC: 0.8 MG/DL (ref 0.6–1.3)
CREAT SERPL-MCNC: 0.9 MG/DL (ref 0.6–1.3)
CREAT SERPL-MCNC: 0.9 MG/DL (ref 0.6–1.3)
CREAT SERPL-MCNC: 0.93 MG/DL (ref 0.6–1.3)
CREAT SERPL-MCNC: 1.05 MG/DL (ref 0.6–1.3)
CREAT SERPL-MCNC: 1.07 MG/DL (ref 0.6–1.3)
CREAT SERPL-MCNC: 1.11 MG/DL (ref 0.6–1.3)
CREAT SERPL-MCNC: 1.14 MG/DL (ref 0.6–1.3)
CREAT SERPL-MCNC: 1.27 MG/DL (ref 0.6–1.3)
CREAT SERPL-MCNC: 1.36 MG/DL (ref 0.6–1.3)
CREAT SERPL-MCNC: 1.4 MG/DL (ref 0.6–1.3)
CREAT SERPL-MCNC: 1.41 MG/DL (ref 0.6–1.3)
CREAT SERPL-MCNC: 1.42 MG/DL (ref 0.6–1.3)
CREAT SERPL-MCNC: 1.44 MG/DL (ref 0.6–1.3)
CREAT SERPL-MCNC: 1.47 MG/DL (ref 0.6–1.3)
CROSSMATCH: NORMAL
EOSINOPHIL # BLD AUTO: 0.04 THOUSAND/ΜL (ref 0–0.61)
EOSINOPHIL # BLD AUTO: 0.1 THOUSAND/ΜL (ref 0–0.61)
EOSINOPHIL # BLD AUTO: 0.13 THOUSAND/ΜL (ref 0–0.61)
EOSINOPHIL # BLD AUTO: 0.19 THOUSAND/ΜL (ref 0–0.61)
EOSINOPHIL # BLD AUTO: 0.19 THOUSAND/ΜL (ref 0–0.61)
EOSINOPHIL # BLD AUTO: 0.23 THOUSAND/ΜL (ref 0–0.61)
EOSINOPHIL # BLD AUTO: 0.23 THOUSAND/ΜL (ref 0–0.61)
EOSINOPHIL # BLD AUTO: 0.3 THOUSAND/ΜL (ref 0–0.61)
EOSINOPHIL # BLD AUTO: 0.39 THOUSAND/ΜL (ref 0–0.61)
EOSINOPHIL NFR BLD AUTO: 0 % (ref 0–6)
EOSINOPHIL NFR BLD AUTO: 1 % (ref 0–6)
EOSINOPHIL NFR BLD AUTO: 1 % (ref 0–6)
EOSINOPHIL NFR BLD AUTO: 2 % (ref 0–6)
EOSINOPHIL NFR BLD AUTO: 2 % (ref 0–6)
EOSINOPHIL NFR BLD AUTO: 3 % (ref 0–6)
ERYTHROCYTE [DISTWIDTH] IN BLOOD BY AUTOMATED COUNT: 13.6 % (ref 11.6–15.1)
ERYTHROCYTE [DISTWIDTH] IN BLOOD BY AUTOMATED COUNT: 14 % (ref 11.6–15.1)
ERYTHROCYTE [DISTWIDTH] IN BLOOD BY AUTOMATED COUNT: 14.9 % (ref 11.6–15.1)
ERYTHROCYTE [DISTWIDTH] IN BLOOD BY AUTOMATED COUNT: 15.5 % (ref 11.6–15.1)
ERYTHROCYTE [DISTWIDTH] IN BLOOD BY AUTOMATED COUNT: 15.9 % (ref 11.6–15.1)
ERYTHROCYTE [DISTWIDTH] IN BLOOD BY AUTOMATED COUNT: 16.5 % (ref 11.6–15.1)
ERYTHROCYTE [DISTWIDTH] IN BLOOD BY AUTOMATED COUNT: 17 % (ref 11.6–15.1)
ERYTHROCYTE [DISTWIDTH] IN BLOOD BY AUTOMATED COUNT: 17.2 % (ref 11.6–15.1)
ERYTHROCYTE [DISTWIDTH] IN BLOOD BY AUTOMATED COUNT: 17.2 % (ref 11.6–15.1)
ERYTHROCYTE [DISTWIDTH] IN BLOOD BY AUTOMATED COUNT: 17.4 % (ref 11.6–15.1)
ERYTHROCYTE [DISTWIDTH] IN BLOOD BY AUTOMATED COUNT: 17.4 % (ref 11.6–15.1)
ERYTHROCYTE [DISTWIDTH] IN BLOOD BY AUTOMATED COUNT: 17.8 % (ref 11.6–15.1)
FERRITIN SERPL-MCNC: 193 NG/ML (ref 8–388)
FOLATE SERPL-MCNC: 14.7 NG/ML (ref 3.1–17.5)
GFR SERPL CREATININE-BSD FRML MDRD: 43 ML/MIN/1.73SQ M
GFR SERPL CREATININE-BSD FRML MDRD: 44 ML/MIN/1.73SQ M
GFR SERPL CREATININE-BSD FRML MDRD: 45 ML/MIN/1.73SQ M
GFR SERPL CREATININE-BSD FRML MDRD: 45 ML/MIN/1.73SQ M
GFR SERPL CREATININE-BSD FRML MDRD: 46 ML/MIN/1.73SQ M
GFR SERPL CREATININE-BSD FRML MDRD: 47 ML/MIN/1.73SQ M
GFR SERPL CREATININE-BSD FRML MDRD: 52 ML/MIN/1.73SQ M
GFR SERPL CREATININE-BSD FRML MDRD: 59 ML/MIN/1.73SQ M
GFR SERPL CREATININE-BSD FRML MDRD: 61 ML/MIN/1.73SQ M
GFR SERPL CREATININE-BSD FRML MDRD: 63 ML/MIN/1.73SQ M
GFR SERPL CREATININE-BSD FRML MDRD: 65 ML/MIN/1.73SQ M
GFR SERPL CREATININE-BSD FRML MDRD: 75 ML/MIN/1.73SQ M
GFR SERPL CREATININE-BSD FRML MDRD: 78 ML/MIN/1.73SQ M
GFR SERPL CREATININE-BSD FRML MDRD: 78 ML/MIN/1.73SQ M
GFR SERPL CREATININE-BSD FRML MDRD: 82 ML/MIN/1.73SQ M
GLUCOSE P FAST SERPL-MCNC: 103 MG/DL (ref 65–99)
GLUCOSE P FAST SERPL-MCNC: 112 MG/DL (ref 65–99)
GLUCOSE P FAST SERPL-MCNC: 119 MG/DL (ref 65–99)
GLUCOSE P FAST SERPL-MCNC: 89 MG/DL (ref 65–99)
GLUCOSE P FAST SERPL-MCNC: 90 MG/DL (ref 65–99)
GLUCOSE P FAST SERPL-MCNC: 94 MG/DL (ref 65–99)
GLUCOSE SERPL-MCNC: 110 MG/DL (ref 65–140)
GLUCOSE SERPL-MCNC: 114 MG/DL (ref 65–140)
GLUCOSE SERPL-MCNC: 118 MG/DL (ref 65–140)
GLUCOSE SERPL-MCNC: 136 MG/DL (ref 65–140)
GLUCOSE SERPL-MCNC: 139 MG/DL (ref 65–140)
GLUCOSE SERPL-MCNC: 83 MG/DL (ref 65–140)
GLUCOSE SERPL-MCNC: 86 MG/DL (ref 65–140)
GLUCOSE SERPL-MCNC: 91 MG/DL (ref 65–140)
GLUCOSE SERPL-MCNC: 92 MG/DL (ref 65–140)
GLUCOSE SERPL-MCNC: 94 MG/DL (ref 65–140)
GLUCOSE UR STRIP-MCNC: NEGATIVE MG/DL
HCT VFR BLD AUTO: 22.6 % (ref 36.5–49.3)
HCT VFR BLD AUTO: 23.4 % (ref 36.5–49.3)
HCT VFR BLD AUTO: 24.6 % (ref 36.5–49.3)
HCT VFR BLD AUTO: 24.8 % (ref 36.5–49.3)
HCT VFR BLD AUTO: 24.9 % (ref 36.5–49.3)
HCT VFR BLD AUTO: 26.5 % (ref 36.5–49.3)
HCT VFR BLD AUTO: 27.2 % (ref 36.5–49.3)
HCT VFR BLD AUTO: 28 % (ref 36.5–49.3)
HCT VFR BLD AUTO: 30.1 % (ref 36.5–49.3)
HCT VFR BLD AUTO: 31.6 % (ref 36.5–49.3)
HCT VFR BLD AUTO: 32.3 % (ref 36.5–49.3)
HCT VFR BLD AUTO: 33.3 % (ref 36.5–49.3)
HCT VFR BLD AUTO: 35.5 % (ref 36.5–49.3)
HGB BLD-MCNC: 10 G/DL (ref 12–17)
HGB BLD-MCNC: 10.1 G/DL (ref 12–17)
HGB BLD-MCNC: 10.5 G/DL (ref 12–17)
HGB BLD-MCNC: 11.2 G/DL (ref 12–17)
HGB BLD-MCNC: 6.7 G/DL (ref 12–17)
HGB BLD-MCNC: 7 G/DL (ref 12–17)
HGB BLD-MCNC: 7.4 G/DL (ref 12–17)
HGB BLD-MCNC: 7.7 G/DL (ref 12–17)
HGB BLD-MCNC: 7.8 G/DL (ref 12–17)
HGB BLD-MCNC: 7.9 G/DL (ref 12–17)
HGB BLD-MCNC: 8.2 G/DL (ref 12–17)
HGB BLD-MCNC: 8.4 G/DL (ref 12–17)
HGB BLD-MCNC: 8.4 G/DL (ref 12–17)
HGB BLD-MCNC: 9.4 G/DL (ref 12–17)
HGB UR QL STRIP.AUTO: ABNORMAL
HGB UR QL STRIP.AUTO: NEGATIVE
IMM GRANULOCYTES # BLD AUTO: 0.04 THOUSAND/UL (ref 0–0.2)
IMM GRANULOCYTES # BLD AUTO: 0.05 THOUSAND/UL (ref 0–0.2)
IMM GRANULOCYTES # BLD AUTO: 0.06 THOUSAND/UL (ref 0–0.2)
IMM GRANULOCYTES # BLD AUTO: 0.08 THOUSAND/UL (ref 0–0.2)
IMM GRANULOCYTES # BLD AUTO: 0.11 THOUSAND/UL (ref 0–0.2)
IMM GRANULOCYTES NFR BLD AUTO: 0 % (ref 0–2)
IMM GRANULOCYTES NFR BLD AUTO: 1 % (ref 0–2)
INR PPP: 1.06 (ref 0.84–1.19)
INR PPP: 1.15 (ref 0.84–1.19)
IRON SATN MFR SERPL: 6 % (ref 20–50)
IRON SERPL-MCNC: 20 UG/DL (ref 65–175)
KETONES UR STRIP-MCNC: NEGATIVE MG/DL
LACTATE SERPL-SCNC: 2 MMOL/L (ref 0.5–2)
LEUKOCYTE ESTERASE UR QL STRIP: ABNORMAL
LYMPHOCYTES # BLD AUTO: 0.86 THOUSANDS/ΜL (ref 0.6–4.47)
LYMPHOCYTES # BLD AUTO: 0.99 THOUSANDS/ΜL (ref 0.6–4.47)
LYMPHOCYTES # BLD AUTO: 1 THOUSANDS/ΜL (ref 0.6–4.47)
LYMPHOCYTES # BLD AUTO: 1.11 THOUSANDS/ΜL (ref 0.6–4.47)
LYMPHOCYTES # BLD AUTO: 1.44 THOUSANDS/ΜL (ref 0.6–4.47)
LYMPHOCYTES # BLD AUTO: 1.58 THOUSANDS/ΜL (ref 0.6–4.47)
LYMPHOCYTES # BLD AUTO: 2.2 THOUSANDS/ΜL (ref 0.6–4.47)
LYMPHOCYTES # BLD AUTO: 2.39 THOUSANDS/ΜL (ref 0.6–4.47)
LYMPHOCYTES # BLD AUTO: 2.88 THOUSANDS/ΜL (ref 0.6–4.47)
LYMPHOCYTES NFR BLD AUTO: 10 % (ref 14–44)
LYMPHOCYTES NFR BLD AUTO: 12 % (ref 14–44)
LYMPHOCYTES NFR BLD AUTO: 15 % (ref 14–44)
LYMPHOCYTES NFR BLD AUTO: 15 % (ref 14–44)
LYMPHOCYTES NFR BLD AUTO: 19 % (ref 14–44)
LYMPHOCYTES NFR BLD AUTO: 19 % (ref 14–44)
LYMPHOCYTES NFR BLD AUTO: 21 % (ref 14–44)
LYMPHOCYTES NFR BLD AUTO: 28 % (ref 14–44)
LYMPHOCYTES NFR BLD AUTO: 9 % (ref 14–44)
MCH RBC QN AUTO: 26.7 PG (ref 26.8–34.3)
MCH RBC QN AUTO: 26.8 PG (ref 26.8–34.3)
MCH RBC QN AUTO: 26.9 PG (ref 26.8–34.3)
MCH RBC QN AUTO: 27 PG (ref 26.8–34.3)
MCH RBC QN AUTO: 27.1 PG (ref 26.8–34.3)
MCH RBC QN AUTO: 27.3 PG (ref 26.8–34.3)
MCH RBC QN AUTO: 27.3 PG (ref 26.8–34.3)
MCH RBC QN AUTO: 28 PG (ref 26.8–34.3)
MCH RBC QN AUTO: 28.1 PG (ref 26.8–34.3)
MCH RBC QN AUTO: 28.3 PG (ref 26.8–34.3)
MCH RBC QN AUTO: 28.3 PG (ref 26.8–34.3)
MCH RBC QN AUTO: 28.6 PG (ref 26.8–34.3)
MCHC RBC AUTO-ENTMCNC: 30 G/DL (ref 31.4–37.4)
MCHC RBC AUTO-ENTMCNC: 30.1 G/DL (ref 31.4–37.4)
MCHC RBC AUTO-ENTMCNC: 31 G/DL (ref 31.4–37.4)
MCHC RBC AUTO-ENTMCNC: 31 G/DL (ref 31.4–37.4)
MCHC RBC AUTO-ENTMCNC: 31.2 G/DL (ref 31.4–37.4)
MCHC RBC AUTO-ENTMCNC: 31.3 G/DL (ref 31.4–37.4)
MCHC RBC AUTO-ENTMCNC: 31.3 G/DL (ref 31.4–37.4)
MCHC RBC AUTO-ENTMCNC: 31.5 G/DL (ref 31.4–37.4)
MCHC RBC AUTO-ENTMCNC: 31.5 G/DL (ref 31.4–37.4)
MCHC RBC AUTO-ENTMCNC: 31.6 G/DL (ref 31.4–37.4)
MCHC RBC AUTO-ENTMCNC: 31.7 G/DL (ref 31.4–37.4)
MCHC RBC AUTO-ENTMCNC: 32 G/DL (ref 31.4–37.4)
MCV RBC AUTO: 86 FL (ref 82–98)
MCV RBC AUTO: 87 FL (ref 82–98)
MCV RBC AUTO: 88 FL (ref 82–98)
MCV RBC AUTO: 89 FL (ref 82–98)
MCV RBC AUTO: 90 FL (ref 82–98)
MCV RBC AUTO: 90 FL (ref 82–98)
MCV RBC AUTO: 91 FL (ref 82–98)
MONOCYTES # BLD AUTO: 0.63 THOUSAND/ΜL (ref 0.17–1.22)
MONOCYTES # BLD AUTO: 0.83 THOUSAND/ΜL (ref 0.17–1.22)
MONOCYTES # BLD AUTO: 0.87 THOUSAND/ΜL (ref 0.17–1.22)
MONOCYTES # BLD AUTO: 0.91 THOUSAND/ΜL (ref 0.17–1.22)
MONOCYTES # BLD AUTO: 0.92 THOUSAND/ΜL (ref 0.17–1.22)
MONOCYTES # BLD AUTO: 1.06 THOUSAND/ΜL (ref 0.17–1.22)
MONOCYTES # BLD AUTO: 1.1 THOUSAND/ΜL (ref 0.17–1.22)
MONOCYTES # BLD AUTO: 1.16 THOUSAND/ΜL (ref 0.17–1.22)
MONOCYTES # BLD AUTO: 1.16 THOUSAND/ΜL (ref 0.17–1.22)
MONOCYTES NFR BLD AUTO: 10 % (ref 4–12)
MONOCYTES NFR BLD AUTO: 10 % (ref 4–12)
MONOCYTES NFR BLD AUTO: 11 % (ref 4–12)
MONOCYTES NFR BLD AUTO: 12 % (ref 4–12)
MONOCYTES NFR BLD AUTO: 14 % (ref 4–12)
MONOCYTES NFR BLD AUTO: 8 % (ref 4–12)
MONOCYTES NFR BLD AUTO: 9 % (ref 4–12)
NEUTROPHILS # BLD AUTO: 4.82 THOUSANDS/ΜL (ref 1.85–7.62)
NEUTROPHILS # BLD AUTO: 5.03 THOUSANDS/ΜL (ref 1.85–7.62)
NEUTROPHILS # BLD AUTO: 5.03 THOUSANDS/ΜL (ref 1.85–7.62)
NEUTROPHILS # BLD AUTO: 6.55 THOUSANDS/ΜL (ref 1.85–7.62)
NEUTROPHILS # BLD AUTO: 7.64 THOUSANDS/ΜL (ref 1.85–7.62)
NEUTROPHILS # BLD AUTO: 7.73 THOUSANDS/ΜL (ref 1.85–7.62)
NEUTROPHILS # BLD AUTO: 8.01 THOUSANDS/ΜL (ref 1.85–7.62)
NEUTROPHILS # BLD AUTO: 8.13 THOUSANDS/ΜL (ref 1.85–7.62)
NEUTROPHILS # BLD AUTO: 9.52 THOUSANDS/ΜL (ref 1.85–7.62)
NEUTS SEG NFR BLD AUTO: 58 % (ref 43–75)
NEUTS SEG NFR BLD AUTO: 63 % (ref 43–75)
NEUTS SEG NFR BLD AUTO: 67 % (ref 43–75)
NEUTS SEG NFR BLD AUTO: 68 % (ref 43–75)
NEUTS SEG NFR BLD AUTO: 69 % (ref 43–75)
NEUTS SEG NFR BLD AUTO: 73 % (ref 43–75)
NEUTS SEG NFR BLD AUTO: 76 % (ref 43–75)
NEUTS SEG NFR BLD AUTO: 78 % (ref 43–75)
NEUTS SEG NFR BLD AUTO: 82 % (ref 43–75)
NITRITE UR QL STRIP: NEGATIVE
NON-SQ EPI CELLS URNS QL MICRO: ABNORMAL /HPF
NRBC BLD AUTO-RTO: 0 /100 WBCS
OSMOLALITY UR/SERPL-RTO: 268 MMOL/KG (ref 282–298)
OSMOLALITY UR: 385 MMOL/KG
OTHER STN SPEC: ABNORMAL
P AXIS: 73 DEGREES
P AXIS: 84 DEGREES
PH UR STRIP.AUTO: 6.5 [PH]
PH UR STRIP.AUTO: 8 [PH]
PLATELET # BLD AUTO: 312 THOUSANDS/UL (ref 149–390)
PLATELET # BLD AUTO: 357 THOUSANDS/UL (ref 149–390)
PLATELET # BLD AUTO: 364 THOUSANDS/UL (ref 149–390)
PLATELET # BLD AUTO: 403 THOUSANDS/UL (ref 149–390)
PLATELET # BLD AUTO: 407 THOUSANDS/UL (ref 149–390)
PLATELET # BLD AUTO: 408 THOUSANDS/UL (ref 149–390)
PLATELET # BLD AUTO: 418 THOUSANDS/UL (ref 149–390)
PLATELET # BLD AUTO: 427 THOUSANDS/UL (ref 149–390)
PLATELET # BLD AUTO: 452 THOUSANDS/UL (ref 149–390)
PLATELET # BLD AUTO: 458 THOUSANDS/UL (ref 149–390)
PLATELET # BLD AUTO: 463 THOUSANDS/UL (ref 149–390)
PLATELET # BLD AUTO: 497 THOUSANDS/UL (ref 149–390)
PMV BLD AUTO: 10 FL (ref 8.9–12.7)
PMV BLD AUTO: 8.7 FL (ref 8.9–12.7)
PMV BLD AUTO: 8.8 FL (ref 8.9–12.7)
PMV BLD AUTO: 8.8 FL (ref 8.9–12.7)
PMV BLD AUTO: 8.9 FL (ref 8.9–12.7)
PMV BLD AUTO: 9.2 FL (ref 8.9–12.7)
PMV BLD AUTO: 9.2 FL (ref 8.9–12.7)
PMV BLD AUTO: 9.3 FL (ref 8.9–12.7)
PMV BLD AUTO: 9.4 FL (ref 8.9–12.7)
PMV BLD AUTO: 9.5 FL (ref 8.9–12.7)
PMV BLD AUTO: 9.7 FL (ref 8.9–12.7)
PMV BLD AUTO: 9.8 FL (ref 8.9–12.7)
POST-VOID RESIDUAL VOLUME, ML POC: 127 ML
POST-VOID RESIDUAL VOLUME, ML POC: 272 ML
POST-VOID RESIDUAL VOLUME, ML POC: 294 ML
POTASSIUM SERPL-SCNC: 4 MMOL/L (ref 3.5–5.3)
POTASSIUM SERPL-SCNC: 4.1 MMOL/L (ref 3.5–5.3)
POTASSIUM SERPL-SCNC: 4.1 MMOL/L (ref 3.5–5.3)
POTASSIUM SERPL-SCNC: 4.3 MMOL/L (ref 3.5–5.3)
POTASSIUM SERPL-SCNC: 4.4 MMOL/L (ref 3.5–5.3)
POTASSIUM SERPL-SCNC: 4.5 MMOL/L (ref 3.5–5.3)
POTASSIUM SERPL-SCNC: 4.5 MMOL/L (ref 3.5–5.3)
POTASSIUM SERPL-SCNC: 4.6 MMOL/L (ref 3.5–5.3)
POTASSIUM SERPL-SCNC: 4.6 MMOL/L (ref 3.5–5.3)
POTASSIUM SERPL-SCNC: 4.8 MMOL/L (ref 3.5–5.3)
POTASSIUM SERPL-SCNC: 4.9 MMOL/L (ref 3.5–5.3)
POTASSIUM SERPL-SCNC: 4.9 MMOL/L (ref 3.5–5.3)
POTASSIUM SERPL-SCNC: 5.2 MMOL/L (ref 3.5–5.3)
PR INTERVAL: 242 MS
PR INTERVAL: 272 MS
PROCALCITONIN SERPL-MCNC: <0.05 NG/ML
PROT SERPL-MCNC: 6.8 G/DL (ref 6.4–8.2)
PROT SERPL-MCNC: 6.8 G/DL (ref 6.4–8.2)
PROT SERPL-MCNC: 7 G/DL (ref 6.4–8.2)
PROT SERPL-MCNC: 7.3 G/DL (ref 6.4–8.2)
PROT SERPL-MCNC: 7.4 G/DL (ref 6.4–8.2)
PROT SERPL-MCNC: 7.7 G/DL (ref 6.4–8.2)
PROT SERPL-MCNC: 7.9 G/DL (ref 6.4–8.2)
PROT SERPL-MCNC: 7.9 G/DL (ref 6.4–8.2)
PROT UR STRIP-MCNC: ABNORMAL MG/DL
PROTHROMBIN TIME: 13.8 SECONDS (ref 11.6–14.5)
PROTHROMBIN TIME: 14.3 SECONDS (ref 11.6–14.5)
QRS AXIS: 69 DEGREES
QRS AXIS: 74 DEGREES
QRSD INTERVAL: 88 MS
QRSD INTERVAL: 90 MS
QT INTERVAL: 364 MS
QT INTERVAL: 414 MS
QTC INTERVAL: 428 MS
QTC INTERVAL: 447 MS
RBC # BLD AUTO: 2.62 MILLION/UL (ref 3.88–5.62)
RBC # BLD AUTO: 2.71 MILLION/UL (ref 3.88–5.62)
RBC # BLD AUTO: 2.86 MILLION/UL (ref 3.88–5.62)
RBC # BLD AUTO: 2.88 MILLION/UL (ref 3.88–5.62)
RBC # BLD AUTO: 3.06 MILLION/UL (ref 3.88–5.62)
RBC # BLD AUTO: 3.08 MILLION/UL (ref 3.88–5.62)
RBC # BLD AUTO: 3.11 MILLION/UL (ref 3.88–5.62)
RBC # BLD AUTO: 3.32 MILLION/UL (ref 3.88–5.62)
RBC # BLD AUTO: 3.57 MILLION/UL (ref 3.88–5.62)
RBC # BLD AUTO: 3.59 MILLION/UL (ref 3.88–5.62)
RBC # BLD AUTO: 3.71 MILLION/UL (ref 3.88–5.62)
RBC # BLD AUTO: 3.91 MILLION/UL (ref 3.88–5.62)
RBC #/AREA URNS AUTO: ABNORMAL /HPF
RH BLD: POSITIVE
RH BLD: POSITIVE
SODIUM 24H UR-SCNC: 61 MOL/L
SODIUM SERPL-SCNC: 123 MMOL/L (ref 136–145)
SODIUM SERPL-SCNC: 124 MMOL/L (ref 136–145)
SODIUM SERPL-SCNC: 125 MMOL/L (ref 136–145)
SODIUM SERPL-SCNC: 125 MMOL/L (ref 136–145)
SODIUM SERPL-SCNC: 126 MMOL/L (ref 136–145)
SODIUM SERPL-SCNC: 127 MMOL/L (ref 136–145)
SODIUM SERPL-SCNC: 127 MMOL/L (ref 136–145)
SODIUM SERPL-SCNC: 128 MMOL/L (ref 136–145)
SODIUM SERPL-SCNC: 129 MMOL/L (ref 136–145)
SODIUM SERPL-SCNC: 129 MMOL/L (ref 136–145)
SODIUM SERPL-SCNC: 130 MMOL/L (ref 136–145)
SODIUM SERPL-SCNC: 130 MMOL/L (ref 136–145)
SODIUM SERPL-SCNC: 134 MMOL/L (ref 136–145)
SP GR UR STRIP.AUTO: 1.01 (ref 1–1.03)
SPECIMEN EXPIRATION DATE: NORMAL
T WAVE AXIS: 56 DEGREES
T WAVE AXIS: 82 DEGREES
T3FREE SERPL-MCNC: 1.88 PG/ML (ref 2.3–4.2)
T3FREE SERPL-MCNC: 1.9 PG/ML (ref 2.3–4.2)
T3FREE SERPL-MCNC: 1.92 PG/ML (ref 2.3–4.2)
T3FREE SERPL-MCNC: 1.93 PG/ML (ref 2.3–4.2)
TIBC SERPL-MCNC: 315 UG/DL (ref 250–450)
TRI-PHOS CRY URNS QL MICRO: ABNORMAL /HPF
TRI-PHOS CRY URNS QL MICRO: ABNORMAL /HPF
TROPONIN I SERPL-MCNC: <0.02 NG/ML
TROPONIN I SERPL-MCNC: <0.02 NG/ML
TSH SERPL DL<=0.05 MIU/L-ACNC: 0.77 UIU/ML (ref 0.36–3.74)
TSH SERPL DL<=0.05 MIU/L-ACNC: 0.91 UIU/ML (ref 0.36–3.74)
TSH SERPL DL<=0.05 MIU/L-ACNC: 1.19 UIU/ML (ref 0.36–3.74)
TSH SERPL DL<=0.05 MIU/L-ACNC: 1.5 UIU/ML (ref 0.36–3.74)
TSH SERPL DL<=0.05 MIU/L-ACNC: 1.71 UIU/ML (ref 0.36–3.74)
UNIT DISPENSE STATUS: NORMAL
UNIT PRODUCT CODE: NORMAL
UNIT PRODUCT VOLUME: 350 ML
UNIT RH: NORMAL
UROBILINOGEN UR QL STRIP.AUTO: 0.2 E.U./DL
UROBILINOGEN UR QL STRIP.AUTO: 1 E.U./DL
VENTRICULAR RATE: 70 BPM
VENTRICULAR RATE: 83 BPM
VIT B12 SERPL-MCNC: 679 PG/ML (ref 100–900)
WBC # BLD AUTO: 10.47 THOUSAND/UL (ref 4.31–10.16)
WBC # BLD AUTO: 10.61 THOUSAND/UL (ref 4.31–10.16)
WBC # BLD AUTO: 11.86 THOUSAND/UL (ref 4.31–10.16)
WBC # BLD AUTO: 13.93 THOUSAND/UL (ref 4.31–10.16)
WBC # BLD AUTO: 14.26 THOUSAND/UL (ref 4.31–10.16)
WBC # BLD AUTO: 6.39 THOUSAND/UL (ref 4.31–10.16)
WBC # BLD AUTO: 7.32 THOUSAND/UL (ref 4.31–10.16)
WBC # BLD AUTO: 7.59 THOUSAND/UL (ref 4.31–10.16)
WBC # BLD AUTO: 8.37 THOUSAND/UL (ref 4.31–10.16)
WBC # BLD AUTO: 8.6 THOUSAND/UL (ref 4.31–10.16)
WBC # BLD AUTO: 9.96 THOUSAND/UL (ref 4.31–10.16)
WBC # BLD AUTO: 9.96 THOUSAND/UL (ref 4.31–10.16)
WBC #/AREA URNS AUTO: ABNORMAL /HPF

## 2021-01-01 PROCEDURE — 62323 NJX INTERLAMINAR LMBR/SAC: CPT | Performed by: ANESTHESIOLOGY

## 2021-01-01 PROCEDURE — 81001 URINALYSIS AUTO W/SCOPE: CPT | Performed by: INTERNAL MEDICINE

## 2021-01-01 PROCEDURE — 99285 EMERGENCY DEPT VISIT HI MDM: CPT

## 2021-01-01 PROCEDURE — 80053 COMPREHEN METABOLIC PANEL: CPT | Performed by: EMERGENCY MEDICINE

## 2021-01-01 PROCEDURE — 83540 ASSAY OF IRON: CPT | Performed by: INTERNAL MEDICINE

## 2021-01-01 PROCEDURE — 80048 BASIC METABOLIC PNL TOTAL CA: CPT | Performed by: INTERNAL MEDICINE

## 2021-01-01 PROCEDURE — 99223 1ST HOSP IP/OBS HIGH 75: CPT | Performed by: NURSE PRACTITIONER

## 2021-01-01 PROCEDURE — 99215 OFFICE O/P EST HI 40 MIN: CPT | Performed by: PHYSICIAN ASSISTANT

## 2021-01-01 PROCEDURE — 87086 URINE CULTURE/COLONY COUNT: CPT

## 2021-01-01 PROCEDURE — 96366 THER/PROPH/DIAG IV INF ADDON: CPT

## 2021-01-01 PROCEDURE — 84443 ASSAY THYROID STIM HORMONE: CPT

## 2021-01-01 PROCEDURE — 52240 CYSTOSCOPY AND TREATMENT: CPT | Performed by: UROLOGY

## 2021-01-01 PROCEDURE — 80053 COMPREHEN METABOLIC PANEL: CPT

## 2021-01-01 PROCEDURE — 96413 CHEMO IV INFUSION 1 HR: CPT

## 2021-01-01 PROCEDURE — 88333 PATH CONSLTJ SURG CYTO XM 1: CPT | Performed by: PATHOLOGY

## 2021-01-01 PROCEDURE — 88307 TISSUE EXAM BY PATHOLOGIST: CPT | Performed by: PATHOLOGY

## 2021-01-01 PROCEDURE — 84145 PROCALCITONIN (PCT): CPT | Performed by: EMERGENCY MEDICINE

## 2021-01-01 PROCEDURE — 91300 SARS-COV-2 / COVID-19 MRNA VACCINE (PFIZER-BIONTECH) 30 MCG: CPT

## 2021-01-01 PROCEDURE — 84481 FREE ASSAY (FT-3): CPT

## 2021-01-01 PROCEDURE — G1004 CDSM NDSC: HCPCS

## 2021-01-01 PROCEDURE — 83935 ASSAY OF URINE OSMOLALITY: CPT | Performed by: EMERGENCY MEDICINE

## 2021-01-01 PROCEDURE — 87086 URINE CULTURE/COLONY COUNT: CPT | Performed by: NURSE PRACTITIONER

## 2021-01-01 PROCEDURE — 86901 BLOOD TYPING SEROLOGIC RH(D): CPT | Performed by: EMERGENCY MEDICINE

## 2021-01-01 PROCEDURE — 71250 CT THORAX DX C-: CPT

## 2021-01-01 PROCEDURE — 71260 CT THORAX DX C+: CPT

## 2021-01-01 PROCEDURE — 83550 IRON BINDING TEST: CPT | Performed by: INTERNAL MEDICINE

## 2021-01-01 PROCEDURE — 85025 COMPLETE CBC W/AUTO DIFF WBC: CPT | Performed by: INTERNAL MEDICINE

## 2021-01-01 PROCEDURE — 99214 OFFICE O/P EST MOD 30 MIN: CPT | Performed by: INTERNAL MEDICINE

## 2021-01-01 PROCEDURE — 85025 COMPLETE CBC W/AUTO DIFF WBC: CPT

## 2021-01-01 PROCEDURE — 99285 EMERGENCY DEPT VISIT HI MDM: CPT | Performed by: EMERGENCY MEDICINE

## 2021-01-01 PROCEDURE — P9016 RBC LEUKOCYTES REDUCED: HCPCS

## 2021-01-01 PROCEDURE — 32408 CORE NDL BX LNG/MED PERQ: CPT | Performed by: RADIOLOGY

## 2021-01-01 PROCEDURE — 74177 CT ABD & PELVIS W/CONTRAST: CPT

## 2021-01-01 PROCEDURE — 85730 THROMBOPLASTIN TIME PARTIAL: CPT | Performed by: EMERGENCY MEDICINE

## 2021-01-01 PROCEDURE — 93010 ELECTROCARDIOGRAM REPORT: CPT | Performed by: INTERNAL MEDICINE

## 2021-01-01 PROCEDURE — 96365 THER/PROPH/DIAG IV INF INIT: CPT

## 2021-01-01 PROCEDURE — 99215 OFFICE O/P EST HI 40 MIN: CPT | Performed by: INTERNAL MEDICINE

## 2021-01-01 PROCEDURE — NC001 PR NO CHARGE

## 2021-01-01 PROCEDURE — 99215 OFFICE O/P EST HI 40 MIN: CPT | Performed by: UROLOGY

## 2021-01-01 PROCEDURE — 88305 TISSUE EXAM BY PATHOLOGIST: CPT | Performed by: PATHOLOGY

## 2021-01-01 PROCEDURE — 82607 VITAMIN B-12: CPT | Performed by: INTERNAL MEDICINE

## 2021-01-01 PROCEDURE — 93005 ELECTROCARDIOGRAM TRACING: CPT

## 2021-01-01 PROCEDURE — 32408 CORE NDL BX LNG/MED PERQ: CPT

## 2021-01-01 PROCEDURE — 36415 COLL VENOUS BLD VENIPUNCTURE: CPT

## 2021-01-01 PROCEDURE — 87077 CULTURE AEROBIC IDENTIFY: CPT

## 2021-01-01 PROCEDURE — 88341 IMHCHEM/IMCYTCHM EA ADD ANTB: CPT | Performed by: PATHOLOGY

## 2021-01-01 PROCEDURE — 99232 SBSQ HOSP IP/OBS MODERATE 35: CPT | Performed by: INTERNAL MEDICINE

## 2021-01-01 PROCEDURE — 80048 BASIC METABOLIC PNL TOTAL CA: CPT

## 2021-01-01 PROCEDURE — 85027 COMPLETE CBC AUTOMATED: CPT | Performed by: RADIOLOGY

## 2021-01-01 PROCEDURE — 85014 HEMATOCRIT: CPT | Performed by: INTERNAL MEDICINE

## 2021-01-01 PROCEDURE — 99205 OFFICE O/P NEW HI 60 MIN: CPT | Performed by: INTERNAL MEDICINE

## 2021-01-01 PROCEDURE — 51798 US URINE CAPACITY MEASURE: CPT | Performed by: UROLOGY

## 2021-01-01 PROCEDURE — 85018 HEMOGLOBIN: CPT | Performed by: INTERNAL MEDICINE

## 2021-01-01 PROCEDURE — 88363 XM ARCHIVE TISSUE MOLEC ANAL: CPT | Performed by: PATHOLOGY

## 2021-01-01 PROCEDURE — 86920 COMPATIBILITY TEST SPIN: CPT

## 2021-01-01 PROCEDURE — 99214 OFFICE O/P EST MOD 30 MIN: CPT | Performed by: FAMILY MEDICINE

## 2021-01-01 PROCEDURE — 99220 PR INITIAL OBSERVATION CARE/DAY 70 MINUTES: CPT | Performed by: INTERNAL MEDICINE

## 2021-01-01 PROCEDURE — 88342 IMHCHEM/IMCYTCHM 1ST ANTB: CPT | Performed by: PATHOLOGY

## 2021-01-01 PROCEDURE — 85610 PROTHROMBIN TIME: CPT | Performed by: EMERGENCY MEDICINE

## 2021-01-01 PROCEDURE — 81001 URINALYSIS AUTO W/SCOPE: CPT

## 2021-01-01 PROCEDURE — 86850 RBC ANTIBODY SCREEN: CPT | Performed by: EMERGENCY MEDICINE

## 2021-01-01 PROCEDURE — 86900 BLOOD TYPING SEROLOGIC ABO: CPT | Performed by: EMERGENCY MEDICINE

## 2021-01-01 PROCEDURE — 0001A SARS-COV-2 / COVID-19 MRNA VACCINE (PFIZER-BIONTECH) 30 MCG: CPT

## 2021-01-01 PROCEDURE — 51798 US URINE CAPACITY MEASURE: CPT

## 2021-01-01 PROCEDURE — 99211 OFF/OP EST MAY X REQ PHY/QHP: CPT

## 2021-01-01 PROCEDURE — 77012 CT SCAN FOR NEEDLE BIOPSY: CPT

## 2021-01-01 PROCEDURE — 99217 PR OBSERVATION CARE DISCHARGE MANAGEMENT: CPT | Performed by: NURSE PRACTITIONER

## 2021-01-01 PROCEDURE — 81001 URINALYSIS AUTO W/SCOPE: CPT | Performed by: EMERGENCY MEDICINE

## 2021-01-01 PROCEDURE — 99215 OFFICE O/P EST HI 40 MIN: CPT | Performed by: SURGERY

## 2021-01-01 PROCEDURE — 87186 SC STD MICRODIL/AGAR DIL: CPT | Performed by: NURSE PRACTITIONER

## 2021-01-01 PROCEDURE — 87186 SC STD MICRODIL/AGAR DIL: CPT

## 2021-01-01 PROCEDURE — 84484 ASSAY OF TROPONIN QUANT: CPT | Performed by: EMERGENCY MEDICINE

## 2021-01-01 PROCEDURE — 87077 CULTURE AEROBIC IDENTIFY: CPT | Performed by: EMERGENCY MEDICINE

## 2021-01-01 PROCEDURE — 99214 OFFICE O/P EST MOD 30 MIN: CPT | Performed by: UROLOGY

## 2021-01-01 PROCEDURE — 99223 1ST HOSP IP/OBS HIGH 75: CPT | Performed by: INTERNAL MEDICINE

## 2021-01-01 PROCEDURE — 85025 COMPLETE CBC W/AUTO DIFF WBC: CPT | Performed by: EMERGENCY MEDICINE

## 2021-01-01 PROCEDURE — 96367 TX/PROPH/DG ADDL SEQ IV INF: CPT

## 2021-01-01 PROCEDURE — 0002A SARS-COV-2 / COVID-19 MRNA VACCINE (PFIZER-BIONTECH) 30 MCG: CPT

## 2021-01-01 PROCEDURE — 99232 SBSQ HOSP IP/OBS MODERATE 35: CPT | Performed by: NURSE PRACTITIONER

## 2021-01-01 PROCEDURE — 87086 URINE CULTURE/COLONY COUNT: CPT | Performed by: EMERGENCY MEDICINE

## 2021-01-01 PROCEDURE — 99239 HOSP IP/OBS DSCHRG MGMT >30: CPT | Performed by: INTERNAL MEDICINE

## 2021-01-01 PROCEDURE — 64483 NJX AA&/STRD TFRM EPI L/S 1: CPT | Performed by: ANESTHESIOLOGY

## 2021-01-01 PROCEDURE — 99213 OFFICE O/P EST LOW 20 MIN: CPT | Performed by: NURSE PRACTITIONER

## 2021-01-01 PROCEDURE — 85027 COMPLETE CBC AUTOMATED: CPT | Performed by: INTERNAL MEDICINE

## 2021-01-01 PROCEDURE — 36415 COLL VENOUS BLD VENIPUNCTURE: CPT | Performed by: EMERGENCY MEDICINE

## 2021-01-01 PROCEDURE — 99153 MOD SED SAME PHYS/QHP EA: CPT

## 2021-01-01 PROCEDURE — 87077 CULTURE AEROBIC IDENTIFY: CPT | Performed by: NURSE PRACTITIONER

## 2021-01-01 PROCEDURE — 84300 ASSAY OF URINE SODIUM: CPT | Performed by: EMERGENCY MEDICINE

## 2021-01-01 PROCEDURE — 83605 ASSAY OF LACTIC ACID: CPT | Performed by: EMERGENCY MEDICINE

## 2021-01-01 PROCEDURE — 82728 ASSAY OF FERRITIN: CPT | Performed by: INTERNAL MEDICINE

## 2021-01-01 PROCEDURE — 99152 MOD SED SAME PHYS/QHP 5/>YRS: CPT

## 2021-01-01 PROCEDURE — 36415 COLL VENOUS BLD VENIPUNCTURE: CPT | Performed by: INTERNAL MEDICINE

## 2021-01-01 PROCEDURE — 81001 URINALYSIS AUTO W/SCOPE: CPT | Performed by: NURSE PRACTITIONER

## 2021-01-01 PROCEDURE — 99152 MOD SED SAME PHYS/QHP 5/>YRS: CPT | Performed by: RADIOLOGY

## 2021-01-01 PROCEDURE — 64484 NJX AA&/STRD TFRM EPI L/S EA: CPT | Performed by: ANESTHESIOLOGY

## 2021-01-01 PROCEDURE — 87186 SC STD MICRODIL/AGAR DIL: CPT | Performed by: EMERGENCY MEDICINE

## 2021-01-01 PROCEDURE — 85610 PROTHROMBIN TIME: CPT | Performed by: RADIOLOGY

## 2021-01-01 PROCEDURE — 72146 MRI CHEST SPINE W/O DYE: CPT

## 2021-01-01 PROCEDURE — 82746 ASSAY OF FOLIC ACID SERUM: CPT | Performed by: INTERNAL MEDICINE

## 2021-01-01 PROCEDURE — 84145 PROCALCITONIN (PCT): CPT

## 2021-01-01 PROCEDURE — 87040 BLOOD CULTURE FOR BACTERIA: CPT | Performed by: EMERGENCY MEDICINE

## 2021-01-01 PROCEDURE — 52000 CYSTOURETHROSCOPY: CPT | Performed by: UROLOGY

## 2021-01-01 PROCEDURE — 83930 ASSAY OF BLOOD OSMOLALITY: CPT | Performed by: EMERGENCY MEDICINE

## 2021-01-01 RX ORDER — LIDOCAINE HYDROCHLORIDE 10 MG/ML
5 INJECTION, SOLUTION EPIDURAL; INFILTRATION; INTRACAUDAL; PERINEURAL ONCE
Status: COMPLETED | OUTPATIENT
Start: 2021-01-01 | End: 2021-01-01

## 2021-01-01 RX ORDER — NYSTATIN 100000 [USP'U]/G
POWDER TOPICAL 2 TIMES DAILY
Qty: 15 G | Refills: 0 | Status: SHIPPED | OUTPATIENT
Start: 2021-01-01

## 2021-01-01 RX ORDER — SENNA PLUS 8.6 MG/1
1 TABLET ORAL 2 TIMES DAILY PRN
Qty: 60 TABLET | Refills: 11 | Status: SHIPPED | OUTPATIENT
Start: 2021-01-01

## 2021-01-01 RX ORDER — LEVOFLOXACIN 750 MG/1
750 TABLET ORAL EVERY 24 HOURS
Qty: 7 TABLET | Refills: 0 | Status: SHIPPED | OUTPATIENT
Start: 2021-01-01 | End: 2021-01-01

## 2021-01-01 RX ORDER — CEPHALEXIN 500 MG/1
500 CAPSULE ORAL EVERY 12 HOURS SCHEDULED
Qty: 10 CAPSULE | Refills: 0 | Status: SHIPPED | OUTPATIENT
Start: 2021-01-01 | End: 2021-01-01

## 2021-01-01 RX ORDER — OXYCODONE HYDROCHLORIDE 5 MG/1
10 TABLET ORAL EVERY 4 HOURS PRN
Status: DISCONTINUED | OUTPATIENT
Start: 2021-01-01 | End: 2021-01-01 | Stop reason: HOSPADM

## 2021-01-01 RX ORDER — LEVOFLOXACIN 500 MG/1
500 TABLET, FILM COATED ORAL EVERY 24 HOURS
Qty: 7 TABLET | Refills: 0 | Status: SHIPPED | OUTPATIENT
Start: 2021-01-01 | End: 2021-01-01

## 2021-01-01 RX ORDER — NYSTATIN 100000 [USP'U]/G
POWDER TOPICAL 2 TIMES DAILY
Status: DISCONTINUED | OUTPATIENT
Start: 2021-01-01 | End: 2021-01-01

## 2021-01-01 RX ORDER — MIRTAZAPINE 15 MG/1
15 TABLET, FILM COATED ORAL
COMMUNITY
End: 2021-01-01

## 2021-01-01 RX ORDER — NITROFURANTOIN 25; 75 MG/1; MG/1
100 CAPSULE ORAL 2 TIMES DAILY
Qty: 14 CAPSULE | Refills: 0 | Status: SHIPPED | OUTPATIENT
Start: 2021-01-01 | End: 2021-01-01

## 2021-01-01 RX ORDER — SODIUM CHLORIDE 9 MG/ML
75 INJECTION, SOLUTION INTRAVENOUS CONTINUOUS
Status: DISCONTINUED | OUTPATIENT
Start: 2021-01-01 | End: 2021-01-01

## 2021-01-01 RX ORDER — FENTANYL CITRATE 50 UG/ML
25 INJECTION, SOLUTION INTRAMUSCULAR; INTRAVENOUS EVERY 2 HOUR PRN
Status: DISCONTINUED | OUTPATIENT
Start: 2021-01-01 | End: 2021-01-01 | Stop reason: HOSPADM

## 2021-01-01 RX ORDER — PRAVASTATIN SODIUM 80 MG/1
80 TABLET ORAL DAILY
Status: DISCONTINUED | OUTPATIENT
Start: 2021-01-01 | End: 2021-01-01 | Stop reason: HOSPADM

## 2021-01-01 RX ORDER — CHLORAL HYDRATE 500 MG
1000 CAPSULE ORAL DAILY
Status: DISCONTINUED | OUTPATIENT
Start: 2021-01-01 | End: 2021-01-01 | Stop reason: HOSPADM

## 2021-01-01 RX ORDER — SODIUM CHLORIDE 9 MG/ML
20 INJECTION, SOLUTION INTRAVENOUS ONCE
Status: COMPLETED | OUTPATIENT
Start: 2021-01-01 | End: 2021-01-01

## 2021-01-01 RX ORDER — METHOCARBAMOL 500 MG/1
500 TABLET, FILM COATED ORAL 3 TIMES DAILY PRN
COMMUNITY

## 2021-01-01 RX ORDER — GLYCINE 1.5 G/100ML
SOLUTION IRRIGATION AS NEEDED
Status: DISCONTINUED | OUTPATIENT
Start: 2021-01-01 | End: 2021-01-01 | Stop reason: HOSPADM

## 2021-01-01 RX ORDER — SULFAMETHOXAZOLE AND TRIMETHOPRIM 800; 160 MG/1; MG/1
1 TABLET ORAL EVERY 12 HOURS SCHEDULED
Qty: 14 TABLET | Refills: 0 | Status: SHIPPED | OUTPATIENT
Start: 2021-01-01 | End: 2021-01-01

## 2021-01-01 RX ORDER — PRAVASTATIN SODIUM 80 MG/1
80 TABLET ORAL EVERY EVENING
Status: DISCONTINUED | OUTPATIENT
Start: 2021-01-01 | End: 2021-01-01 | Stop reason: HOSPADM

## 2021-01-01 RX ORDER — NITROFURANTOIN 25; 75 MG/1; MG/1
100 CAPSULE ORAL 2 TIMES DAILY
Qty: 6 CAPSULE | Refills: 0 | Status: SHIPPED | OUTPATIENT
Start: 2021-01-01 | End: 2021-01-01

## 2021-01-01 RX ORDER — METHOCARBAMOL 500 MG/1
500 TABLET, FILM COATED ORAL 3 TIMES DAILY PRN
Status: DISCONTINUED | OUTPATIENT
Start: 2021-01-01 | End: 2021-01-01 | Stop reason: HOSPADM

## 2021-01-01 RX ORDER — POLYETHYLENE GLYCOL 3350 17 G/17G
17 POWDER, FOR SOLUTION ORAL DAILY
Status: DISCONTINUED | OUTPATIENT
Start: 2021-01-01 | End: 2021-01-01 | Stop reason: HOSPADM

## 2021-01-01 RX ORDER — SENNOSIDES 8.6 MG
1 TABLET ORAL 2 TIMES DAILY PRN
Status: DISCONTINUED | OUTPATIENT
Start: 2021-01-01 | End: 2021-01-01 | Stop reason: HOSPADM

## 2021-01-01 RX ORDER — LOSARTAN POTASSIUM 50 MG/1
100 TABLET ORAL DAILY
Status: DISCONTINUED | OUTPATIENT
Start: 2021-01-01 | End: 2021-01-01 | Stop reason: HOSPADM

## 2021-01-01 RX ORDER — CIPROFLOXACIN 500 MG/1
500 TABLET, FILM COATED ORAL EVERY 12 HOURS SCHEDULED
Qty: 14 TABLET | Refills: 0 | Status: SHIPPED | OUTPATIENT
Start: 2021-01-01 | End: 2021-01-01

## 2021-01-01 RX ORDER — FENTANYL CITRATE/PF 50 MCG/ML
25 SYRINGE (ML) INJECTION
Status: CANCELLED | OUTPATIENT
Start: 2021-01-01

## 2021-01-01 RX ORDER — LIDOCAINE HYDROCHLORIDE 20 MG/ML
INJECTION, SOLUTION EPIDURAL; INFILTRATION; INTRACAUDAL; PERINEURAL AS NEEDED
Status: DISCONTINUED | OUTPATIENT
Start: 2021-01-01 | End: 2021-01-01

## 2021-01-01 RX ORDER — FENTANYL CITRATE 50 UG/ML
INJECTION, SOLUTION INTRAMUSCULAR; INTRAVENOUS CODE/TRAUMA/SEDATION MEDICATION
Status: COMPLETED | OUTPATIENT
Start: 2021-01-01 | End: 2021-01-01

## 2021-01-01 RX ORDER — SULFAMETHOXAZOLE AND TRIMETHOPRIM 800; 160 MG/1; MG/1
1 TABLET ORAL EVERY 12 HOURS SCHEDULED
Qty: 6 TABLET | Refills: 0 | Status: SHIPPED | OUTPATIENT
Start: 2021-01-01 | End: 2021-01-01 | Stop reason: HOSPADM

## 2021-01-01 RX ORDER — ACETAMINOPHEN 325 MG/1
975 TABLET ORAL ONCE
Status: COMPLETED | OUTPATIENT
Start: 2021-01-01 | End: 2021-01-01

## 2021-01-01 RX ORDER — CEFAZOLIN SODIUM 1 G/50ML
1000 SOLUTION INTRAVENOUS ONCE
Status: COMPLETED | OUTPATIENT
Start: 2021-01-01 | End: 2021-01-01

## 2021-01-01 RX ORDER — PREDNISONE 10 MG/1
10 TABLET ORAL
Qty: 30 TABLET | Refills: 0 | Status: SHIPPED | OUTPATIENT
Start: 2021-01-01 | End: 2021-01-01 | Stop reason: SDUPTHER

## 2021-01-01 RX ORDER — MIDAZOLAM HYDROCHLORIDE 2 MG/2ML
INJECTION, SOLUTION INTRAMUSCULAR; INTRAVENOUS CODE/TRAUMA/SEDATION MEDICATION
Status: COMPLETED | OUTPATIENT
Start: 2021-01-01 | End: 2021-01-01

## 2021-01-01 RX ORDER — TRAZODONE HYDROCHLORIDE 50 MG/1
100 TABLET ORAL
Qty: 60 TABLET | Refills: 0 | Status: SHIPPED | OUTPATIENT
Start: 2021-01-01 | End: 2021-01-01 | Stop reason: SINTOL

## 2021-01-01 RX ORDER — CITALOPRAM 20 MG/1
20 TABLET ORAL DAILY
Qty: 30 TABLET | Refills: 0 | Status: SHIPPED | OUTPATIENT
Start: 2021-01-01 | End: 2021-01-01 | Stop reason: SDUPTHER

## 2021-01-01 RX ORDER — OXYCODONE HYDROCHLORIDE 5 MG/1
5 TABLET ORAL EVERY 4 HOURS PRN
Status: DISCONTINUED | OUTPATIENT
Start: 2021-01-01 | End: 2021-01-01 | Stop reason: HOSPADM

## 2021-01-01 RX ORDER — LIDOCAINE HYDROCHLORIDE 10 MG/ML
5 INJECTION, SOLUTION EPIDURAL; INFILTRATION; INTRACAUDAL; PERINEURAL ONCE
Status: DISCONTINUED | OUTPATIENT
Start: 2021-01-01 | End: 2021-01-01

## 2021-01-01 RX ORDER — TAMSULOSIN HYDROCHLORIDE 0.4 MG/1
0.4 CAPSULE ORAL
Status: DISCONTINUED | OUTPATIENT
Start: 2021-01-01 | End: 2021-01-01 | Stop reason: HOSPADM

## 2021-01-01 RX ORDER — LANOLIN ALCOHOL/MO/W.PET/CERES
6 CREAM (GRAM) TOPICAL
COMMUNITY

## 2021-01-01 RX ORDER — ONDANSETRON 2 MG/ML
4 INJECTION INTRAMUSCULAR; INTRAVENOUS EVERY 6 HOURS PRN
Status: DISCONTINUED | OUTPATIENT
Start: 2021-01-01 | End: 2021-01-01 | Stop reason: HOSPADM

## 2021-01-01 RX ORDER — LANOLIN ALCOHOL/MO/W.PET/CERES
6 CREAM (GRAM) TOPICAL
Status: DISCONTINUED | OUTPATIENT
Start: 2021-01-01 | End: 2021-01-01 | Stop reason: HOSPADM

## 2021-01-01 RX ORDER — CITALOPRAM 20 MG/1
40 TABLET ORAL DAILY
Status: DISCONTINUED | OUTPATIENT
Start: 2021-01-01 | End: 2021-01-01 | Stop reason: HOSPADM

## 2021-01-01 RX ORDER — ACETAMINOPHEN 325 MG/1
975 TABLET ORAL 3 TIMES DAILY
Status: DISCONTINUED | OUTPATIENT
Start: 2021-01-01 | End: 2021-01-01 | Stop reason: HOSPADM

## 2021-01-01 RX ORDER — NITROFURANTOIN 25; 75 MG/1; MG/1
100 CAPSULE ORAL DAILY
Qty: 90 CAPSULE | Refills: 1 | Status: SHIPPED | OUTPATIENT
Start: 2021-01-01 | End: 2022-01-03

## 2021-01-01 RX ORDER — POLYETHYLENE GLYCOL 3350 17 G/17G
17 POWDER, FOR SOLUTION ORAL DAILY
Qty: 30 EACH | Refills: 11 | Status: SHIPPED | OUTPATIENT
Start: 2021-01-01

## 2021-01-01 RX ORDER — HYDRALAZINE HYDROCHLORIDE 20 MG/ML
5 INJECTION INTRAMUSCULAR; INTRAVENOUS EVERY 6 HOURS PRN
Status: DISCONTINUED | OUTPATIENT
Start: 2021-01-01 | End: 2021-01-01 | Stop reason: HOSPADM

## 2021-01-01 RX ORDER — ONDANSETRON 2 MG/ML
INJECTION INTRAMUSCULAR; INTRAVENOUS AS NEEDED
Status: DISCONTINUED | OUTPATIENT
Start: 2021-01-01 | End: 2021-01-01

## 2021-01-01 RX ORDER — ONDANSETRON 2 MG/ML
4 INJECTION INTRAMUSCULAR; INTRAVENOUS ONCE AS NEEDED
Status: CANCELLED | OUTPATIENT
Start: 2021-01-01

## 2021-01-01 RX ORDER — PAPAVERINE HCL 150 MG
20 CAPSULE, EXTENDED RELEASE ORAL ONCE
Status: COMPLETED | OUTPATIENT
Start: 2021-01-01 | End: 2021-01-01

## 2021-01-01 RX ORDER — ASPIRIN 325 MG
325 TABLET ORAL DAILY
Status: DISCONTINUED | OUTPATIENT
Start: 2021-01-01 | End: 2021-01-01

## 2021-01-01 RX ORDER — PREDNISONE 20 MG/1
20 TABLET ORAL DAILY
Qty: 30 TABLET | Refills: 1 | Status: SHIPPED | OUTPATIENT
Start: 2021-01-01

## 2021-01-01 RX ORDER — PROPOFOL 10 MG/ML
INJECTION, EMULSION INTRAVENOUS AS NEEDED
Status: DISCONTINUED | OUTPATIENT
Start: 2021-01-01 | End: 2021-01-01

## 2021-01-01 RX ORDER — SODIUM CHLORIDE 1000 MG
2 TABLET, SOLUBLE MISCELLANEOUS
Qty: 180 TABLET | Refills: 0 | Status: SHIPPED | OUTPATIENT
Start: 2021-01-01

## 2021-01-01 RX ORDER — METHYLPREDNISOLONE ACETATE 80 MG/ML
160 INJECTION, SUSPENSION INTRA-ARTICULAR; INTRALESIONAL; INTRAMUSCULAR; PARENTERAL; SOFT TISSUE ONCE
Status: COMPLETED | OUTPATIENT
Start: 2021-01-01 | End: 2021-01-01

## 2021-01-01 RX ORDER — AMOXICILLIN 500 MG/1
500 CAPSULE ORAL EVERY 8 HOURS SCHEDULED
Qty: 15 CAPSULE | Refills: 0 | Status: SHIPPED | OUTPATIENT
Start: 2021-01-01 | End: 2021-01-01

## 2021-01-01 RX ORDER — CIPROFLOXACIN 500 MG/1
TABLET, FILM COATED ORAL
COMMUNITY
End: 2021-01-01 | Stop reason: ALTCHOICE

## 2021-01-01 RX ORDER — SENNOSIDES 8.6 MG
1 TABLET ORAL 2 TIMES DAILY PRN
Refills: 11 | Status: DISCONTINUED | OUTPATIENT
Start: 2021-01-01 | End: 2021-01-01 | Stop reason: HOSPADM

## 2021-01-01 RX ORDER — NITROFURANTOIN 25; 75 MG/1; MG/1
CAPSULE ORAL
Qty: 10 CAPSULE | Refills: 0 | Status: SHIPPED | OUTPATIENT
Start: 2021-01-01 | End: 2021-01-01 | Stop reason: ALTCHOICE

## 2021-01-01 RX ORDER — PREDNISONE 10 MG/1
5 TABLET ORAL
Qty: 30 TABLET | Refills: 0
Start: 2021-01-01 | End: 2021-01-01 | Stop reason: SDUPTHER

## 2021-01-01 RX ORDER — CEFAZOLIN SODIUM 1 G/50ML
1000 SOLUTION INTRAVENOUS ONCE
Status: CANCELLED | OUTPATIENT
Start: 2021-01-01 | End: 2021-01-01

## 2021-01-01 RX ORDER — PREDNISONE 10 MG/1
TABLET ORAL
Qty: 40 TABLET | Refills: 0 | Status: SHIPPED | OUTPATIENT
Start: 2021-01-01 | End: 2021-01-01 | Stop reason: SDUPTHER

## 2021-01-01 RX ORDER — METHYLPREDNISOLONE ACETATE 80 MG/ML
80 INJECTION, SUSPENSION INTRA-ARTICULAR; INTRALESIONAL; INTRAMUSCULAR; PARENTERAL; SOFT TISSUE ONCE
Status: DISCONTINUED | OUTPATIENT
Start: 2021-01-01 | End: 2021-01-01

## 2021-01-01 RX ORDER — SODIUM CHLORIDE 9 MG/ML
20 INJECTION, SOLUTION INTRAVENOUS ONCE
Status: CANCELLED | OUTPATIENT
Start: 2021-01-01

## 2021-01-01 RX ORDER — PREGABALIN 75 MG/1
75 CAPSULE ORAL 3 TIMES DAILY
COMMUNITY
Start: 2021-01-01 | End: 2021-01-01 | Stop reason: ALTCHOICE

## 2021-01-01 RX ORDER — ACETAMINOPHEN 325 MG/1
650 TABLET ORAL EVERY 6 HOURS PRN
Status: DISCONTINUED | OUTPATIENT
Start: 2021-01-01 | End: 2021-01-01 | Stop reason: HOSPADM

## 2021-01-01 RX ORDER — CITALOPRAM 40 MG/1
40 TABLET ORAL DAILY
Qty: 30 TABLET | Refills: 2 | Status: SHIPPED | OUTPATIENT
Start: 2021-01-01

## 2021-01-01 RX ORDER — ACETAMINOPHEN 500 MG
1000 TABLET ORAL 3 TIMES DAILY
Qty: 180 TABLET | Refills: 2
Start: 2021-01-01

## 2021-01-01 RX ORDER — PREDNISONE 10 MG/1
TABLET ORAL
Qty: 30 TABLET | Refills: 0 | Status: ON HOLD
Start: 2021-01-01 | End: 2021-01-01

## 2021-01-01 RX ORDER — CEPHALEXIN 500 MG/1
500 CAPSULE ORAL EVERY 6 HOURS SCHEDULED
Qty: 28 CAPSULE | Refills: 0 | Status: SHIPPED | OUTPATIENT
Start: 2021-01-01 | End: 2021-01-01

## 2021-01-01 RX ORDER — AMOXICILLIN AND CLAVULANATE POTASSIUM 875; 125 MG/1; MG/1
TABLET, FILM COATED ORAL
COMMUNITY
Start: 2021-01-01 | End: 2021-01-01 | Stop reason: ALTCHOICE

## 2021-01-01 RX ORDER — METOPROLOL TARTRATE 50 MG/1
50 TABLET, FILM COATED ORAL 2 TIMES DAILY
Status: DISCONTINUED | OUTPATIENT
Start: 2021-01-01 | End: 2021-01-01 | Stop reason: HOSPADM

## 2021-01-01 RX ORDER — LOSARTAN POTASSIUM 50 MG/1
100 TABLET ORAL ONCE
Status: COMPLETED | OUTPATIENT
Start: 2021-01-01 | End: 2021-01-01

## 2021-01-01 RX ORDER — TAMSULOSIN HYDROCHLORIDE 0.4 MG/1
0.4 CAPSULE ORAL
COMMUNITY

## 2021-01-01 RX ORDER — HEPARIN SODIUM 5000 [USP'U]/ML
5000 INJECTION, SOLUTION INTRAVENOUS; SUBCUTANEOUS EVERY 8 HOURS SCHEDULED
Status: DISCONTINUED | OUTPATIENT
Start: 2021-01-01 | End: 2021-01-01 | Stop reason: HOSPADM

## 2021-01-01 RX ORDER — LOSARTAN POTASSIUM 50 MG/1
100 TABLET ORAL DAILY
Status: DISCONTINUED | OUTPATIENT
Start: 2021-01-01 | End: 2021-01-01

## 2021-01-01 RX ORDER — IBUPROFEN 600 MG/1
600 TABLET ORAL EVERY 6 HOURS PRN
Status: DISCONTINUED | OUTPATIENT
Start: 2021-01-01 | End: 2021-01-01 | Stop reason: HOSPADM

## 2021-01-01 RX ORDER — SODIUM CHLORIDE 9 MG/ML
125 INJECTION, SOLUTION INTRAVENOUS CONTINUOUS
Status: DISCONTINUED | OUTPATIENT
Start: 2021-01-01 | End: 2021-01-01 | Stop reason: HOSPADM

## 2021-01-01 RX ORDER — MAGNESIUM HYDROXIDE 1200 MG/15ML
LIQUID ORAL AS NEEDED
Status: DISCONTINUED | OUTPATIENT
Start: 2021-01-01 | End: 2021-01-01 | Stop reason: HOSPADM

## 2021-01-01 RX ORDER — SODIUM CHLORIDE 1000 MG
2 TABLET, SOLUBLE MISCELLANEOUS
Status: DISCONTINUED | OUTPATIENT
Start: 2021-01-01 | End: 2021-01-01 | Stop reason: HOSPADM

## 2021-01-01 RX ORDER — CEPHALEXIN 250 MG/1
250 CAPSULE ORAL EVERY 24 HOURS
Qty: 30 CAPSULE | Refills: 0 | Status: SHIPPED | OUTPATIENT
Start: 2021-01-01 | End: 2021-01-01 | Stop reason: ALTCHOICE

## 2021-01-01 RX ORDER — ASPIRIN 325 MG
325 TABLET ORAL DAILY
Status: DISCONTINUED | OUTPATIENT
Start: 2021-01-01 | End: 2021-01-01 | Stop reason: HOSPADM

## 2021-01-01 RX ORDER — FENTANYL CITRATE 50 UG/ML
INJECTION, SOLUTION INTRAMUSCULAR; INTRAVENOUS AS NEEDED
Status: DISCONTINUED | OUTPATIENT
Start: 2021-01-01 | End: 2021-01-01

## 2021-01-01 RX ADMIN — SODIUM CHLORIDE 200 MG: 9 INJECTION, SOLUTION INTRAVENOUS at 10:00

## 2021-01-01 RX ADMIN — ACETAMINOPHEN 975 MG: 325 TABLET ORAL at 18:55

## 2021-01-01 RX ADMIN — IRON SUCROSE 300 MG: 20 INJECTION, SOLUTION INTRAVENOUS at 11:43

## 2021-01-01 RX ADMIN — METHYLPREDNISOLONE ACETATE 160 MG: 80 INJECTION, SUSPENSION INTRA-ARTICULAR; INTRALESIONAL; INTRAMUSCULAR; SOFT TISSUE at 14:11

## 2021-01-01 RX ADMIN — ACETAMINOPHEN 975 MG: 325 TABLET, FILM COATED ORAL at 09:21

## 2021-01-01 RX ADMIN — ACETAMINOPHEN 975 MG: 325 TABLET, FILM COATED ORAL at 17:02

## 2021-01-01 RX ADMIN — HEPARIN SODIUM 5000 UNITS: 5000 INJECTION INTRAVENOUS; SUBCUTANEOUS at 05:20

## 2021-01-01 RX ADMIN — IRON SUCROSE 300 MG: 20 INJECTION, SOLUTION INTRAVENOUS at 09:47

## 2021-01-01 RX ADMIN — METOPROLOL TARTRATE 50 MG: 25 TABLET, FILM COATED ORAL at 09:07

## 2021-01-01 RX ADMIN — SODIUM CHLORIDE 200 MG: 9 INJECTION, SOLUTION INTRAVENOUS at 13:21

## 2021-01-01 RX ADMIN — SODIUM CHLORIDE 20 ML/HR: 0.9 INJECTION, SOLUTION INTRAVENOUS at 10:00

## 2021-01-01 RX ADMIN — LIDOCAINE HYDROCHLORIDE 60 MG: 20 INJECTION, SOLUTION EPIDURAL; INFILTRATION; INTRACAUDAL; PERINEURAL at 10:52

## 2021-01-01 RX ADMIN — ASPIRIN 325 MG ORAL TABLET 325 MG: 325 PILL ORAL at 09:31

## 2021-01-01 RX ADMIN — PRAVASTATIN SODIUM 80 MG: 80 TABLET ORAL at 09:31

## 2021-01-01 RX ADMIN — MIDAZOLAM 1 MG: 1 INJECTION INTRAMUSCULAR; INTRAVENOUS at 11:03

## 2021-01-01 RX ADMIN — OMEGA-3 FATTY ACIDS CAP 1000 MG 1000 MG: 1000 CAP at 09:08

## 2021-01-01 RX ADMIN — HEPARIN SODIUM 5000 UNITS: 5000 INJECTION INTRAVENOUS; SUBCUTANEOUS at 14:08

## 2021-01-01 RX ADMIN — SODIUM CHLORIDE 125 ML/HR: 0.9 INJECTION, SOLUTION INTRAVENOUS at 09:44

## 2021-01-01 RX ADMIN — Medication 2 G: at 09:34

## 2021-01-01 RX ADMIN — ACETAMINOPHEN 975 MG: 325 TABLET, FILM COATED ORAL at 21:34

## 2021-01-01 RX ADMIN — METOPROLOL TARTRATE 50 MG: 50 TABLET, FILM COATED ORAL at 09:21

## 2021-01-01 RX ADMIN — HEPARIN SODIUM 5000 UNITS: 5000 INJECTION INTRAVENOUS; SUBCUTANEOUS at 21:34

## 2021-01-01 RX ADMIN — SODIUM CHLORIDE 200 MG: 9 INJECTION, SOLUTION INTRAVENOUS at 10:21

## 2021-01-01 RX ADMIN — SODIUM CHLORIDE 200 MG: 9 INJECTION, SOLUTION INTRAVENOUS at 10:54

## 2021-01-01 RX ADMIN — Medication 6 MG: at 22:58

## 2021-01-01 RX ADMIN — SODIUM CHLORIDE: 0.9 INJECTION, SOLUTION INTRAVENOUS at 10:42

## 2021-01-01 RX ADMIN — ASPIRIN 325 MG ORAL TABLET 325 MG: 325 PILL ORAL at 09:33

## 2021-01-01 RX ADMIN — LOSARTAN POTASSIUM 100 MG: 50 TABLET, FILM COATED ORAL at 16:49

## 2021-01-01 RX ADMIN — FENTANYL CITRATE 50 MCG: 50 INJECTION INTRAMUSCULAR; INTRAVENOUS at 10:55

## 2021-01-01 RX ADMIN — Medication 2 G: at 12:26

## 2021-01-01 RX ADMIN — SODIUM CHLORIDE 20 ML/HR: 0.9 INJECTION, SOLUTION INTRAVENOUS at 10:10

## 2021-01-01 RX ADMIN — SODIUM CHLORIDE 20 ML/HR: 0.9 INJECTION, SOLUTION INTRAVENOUS at 11:42

## 2021-01-01 RX ADMIN — IOHEXOL 1 ML: 300 INJECTION, SOLUTION INTRAVENOUS at 14:10

## 2021-01-01 RX ADMIN — HEPARIN SODIUM 5000 UNITS: 5000 INJECTION INTRAVENOUS; SUBCUTANEOUS at 05:22

## 2021-01-01 RX ADMIN — ACETAMINOPHEN 975 MG: 325 TABLET, FILM COATED ORAL at 09:33

## 2021-01-01 RX ADMIN — FENTANYL CITRATE 50 MCG: 50 INJECTION INTRAMUSCULAR; INTRAVENOUS at 11:00

## 2021-01-01 RX ADMIN — SODIUM CHLORIDE 20 ML/HR: 0.9 INJECTION, SOLUTION INTRAVENOUS at 09:20

## 2021-01-01 RX ADMIN — IOHEXOL 1 ML: 300 INJECTION, SOLUTION INTRAVENOUS at 14:09

## 2021-01-01 RX ADMIN — FENTANYL CITRATE 50 MCG: 50 INJECTION INTRAMUSCULAR; INTRAVENOUS at 11:07

## 2021-01-01 RX ADMIN — Medication 6 MG: at 21:12

## 2021-01-01 RX ADMIN — FENTANYL CITRATE 50 MCG: 50 INJECTION INTRAMUSCULAR; INTRAVENOUS at 11:52

## 2021-01-01 RX ADMIN — SODIUM CHLORIDE 20 ML/HR: 0.9 INJECTION, SOLUTION INTRAVENOUS at 10:17

## 2021-01-01 RX ADMIN — MIDAZOLAM 1 MG: 1 INJECTION INTRAMUSCULAR; INTRAVENOUS at 11:08

## 2021-01-01 RX ADMIN — ONDANSETRON 4 MG: 2 INJECTION INTRAMUSCULAR; INTRAVENOUS at 10:52

## 2021-01-01 RX ADMIN — PROPOFOL 140 MG: 10 INJECTION, EMULSION INTRAVENOUS at 10:52

## 2021-01-01 RX ADMIN — CITALOPRAM HYDROBROMIDE 40 MG: 20 TABLET ORAL at 09:07

## 2021-01-01 RX ADMIN — HEPARIN SODIUM 5000 UNITS: 5000 INJECTION INTRAVENOUS; SUBCUTANEOUS at 22:30

## 2021-01-01 RX ADMIN — LIDOCAINE HYDROCHLORIDE 3 ML: 10 INJECTION, SOLUTION EPIDURAL; INFILTRATION; INTRACAUDAL; PERINEURAL at 14:10

## 2021-01-01 RX ADMIN — CEFAZOLIN SODIUM 1000 MG: 1 SOLUTION INTRAVENOUS at 10:42

## 2021-01-01 RX ADMIN — DEXAMETHASONE SODIUM PHOSPHATE 20 MG: 10 INJECTION, SOLUTION INTRAMUSCULAR; INTRAVENOUS at 14:09

## 2021-01-01 RX ADMIN — FENTANYL CITRATE 50 MCG: 50 INJECTION INTRAMUSCULAR; INTRAVENOUS at 11:08

## 2021-01-01 RX ADMIN — FENTANYL CITRATE 50 MCG: 50 INJECTION INTRAMUSCULAR; INTRAVENOUS at 11:03

## 2021-01-01 RX ADMIN — METOPROLOL TARTRATE 50 MG: 50 TABLET, FILM COATED ORAL at 09:33

## 2021-01-01 RX ADMIN — DEXTROSE 1000 MG: 50 INJECTION, SOLUTION INTRAVENOUS at 20:49

## 2021-01-01 RX ADMIN — Medication 6 MG: at 21:34

## 2021-01-01 RX ADMIN — METOPROLOL TARTRATE 50 MG: 50 TABLET, FILM COATED ORAL at 21:12

## 2021-01-01 RX ADMIN — METOPROLOL TARTRATE 50 MG: 50 TABLET, FILM COATED ORAL at 17:04

## 2021-01-01 RX ADMIN — Medication 2 G: at 17:03

## 2021-01-01 RX ADMIN — IOHEXOL 100 ML: 350 INJECTION, SOLUTION INTRAVENOUS at 19:35

## 2021-01-01 RX ADMIN — CITALOPRAM HYDROBROMIDE 40 MG: 20 TABLET ORAL at 09:31

## 2021-01-01 RX ADMIN — PRAVASTATIN SODIUM 80 MG: 80 TABLET ORAL at 09:33

## 2021-01-01 RX ADMIN — CITALOPRAM HYDROBROMIDE 40 MG: 20 TABLET ORAL at 09:33

## 2021-01-01 RX ADMIN — SODIUM CHLORIDE 75 ML/HR: 0.9 INJECTION, SOLUTION INTRAVENOUS at 21:13

## 2021-01-01 RX ADMIN — TAMSULOSIN HYDROCHLORIDE 0.4 MG: 0.4 CAPSULE ORAL at 17:03

## 2021-01-01 RX ADMIN — SODIUM CHLORIDE 200 MG: 9 INJECTION, SOLUTION INTRAVENOUS at 11:27

## 2021-01-01 RX ADMIN — POLYETHYLENE GLYCOL 3350 17 G: 17 POWDER, FOR SOLUTION ORAL at 09:31

## 2021-01-01 RX ADMIN — ACETAMINOPHEN 975 MG: 325 TABLET, FILM COATED ORAL at 21:12

## 2021-01-01 RX ADMIN — FENTANYL CITRATE 50 MCG: 50 INJECTION INTRAMUSCULAR; INTRAVENOUS at 11:19

## 2021-01-01 RX ADMIN — METOPROLOL TARTRATE 50 MG: 25 TABLET, FILM COATED ORAL at 22:58

## 2021-01-01 RX ADMIN — IOHEXOL 100 ML: 350 INJECTION, SOLUTION INTRAVENOUS at 13:27

## 2021-01-01 RX ADMIN — LOSARTAN POTASSIUM 100 MG: 50 TABLET, FILM COATED ORAL at 09:08

## 2021-01-04 NOTE — TELEPHONE ENCOUNTER
Changed per request     ----- Message from Kun Dewitt DO sent at 1/4/2021  1:53 PM EST -----  Please change 2nd procedure to left L4 left L5 TFESI with same diagnosis codes
1

## 2021-01-04 NOTE — H&P
History of Present Illness: The patient is a 82 y o  male who presents with complaints of low back and leg pain      Patient Active Problem List   Diagnosis    Erectile dysfunction    Urge incontinence of urine    Benign prostatic hyperplasia with nocturia    Recurrent urinary tract infection    Cervical spinal stenosis    Lumbar radiculitis    Spastic gait    Urgency of urination    Chronic pain syndrome    Chronic bilateral low back pain without sciatica    Herniated nucleus pulposus, L4-5    Spinal stenosis of lumbar region with neurogenic claudication       Past Medical History:   Diagnosis Date    Acute cystitis without hematuria     Atherosclerotic heart disease     Erectile dysfunction     Flaccid neuropathic bladder, not elsewhere classified     Hypercholesteremia     Hypertension     Muscle spasms of neck     Urge incontinence of urine     Urinary retention     UTI (urinary tract infection)        Past Surgical History:   Procedure Laterality Date    CERVICAL SPINE SURGERY  2010    CYSTOSCOPY  02/08/2017    LUMBAR SPINE SURGERY  2006         Current Outpatient Medications:     amLODIPine (NORVASC) 5 mg tablet, , Disp: , Rfl:     aspirin 325 mg tablet, Take 325 mg by mouth daily, Disp: , Rfl:     cephalexin (KEFLEX) 500 mg capsule, TAKE 1 CAPSULE BY MOUTH EVERY MORNING, Disp: , Rfl:     citalopram (CeleXA) 40 mg tablet, daily, Disp: , Rfl:     DULoxetine (CYMBALTA) 60 mg delayed release capsule, Take 60 mg by mouth daily, Disp: , Rfl:     losartan (COZAAR) 100 MG tablet, daily, Disp: , Rfl:     Magnesium Oxide 400 MG CAPS, daily, Disp: , Rfl:     methocarbamol (ROBAXIN) 500 mg tablet, Take 500 mg by mouth 3 (three) times a day as needed, Disp: , Rfl:     metoprolol tartrate (LOPRESSOR) 50 mg tablet, 2 (two) times a day, Disp: , Rfl:     Mirabegron ER 25 MG TB24, Take 25 mg by mouth daily, Disp: 30 tablet, Rfl: 3    niacin 500 mg tablet, daily, Disp: , Rfl:    nitrofurantoin (MACROBID) 100 mg capsule, Take 1 capsule (100 mg total) by mouth 2 (two) times a day, Disp: 10 capsule, Rfl: 0    Omega-3 Fatty Acids (FISH OIL PO), daily, Disp: , Rfl:     pravastatin (PRAVACHOL) 80 mg tablet, daily, Disp: , Rfl:     sildenafil (VIAGRA) 100 mg tablet, Take 1 tablet (100 mg total) by mouth daily as needed for erectile dysfunction, Disp: 3 tablet, Rfl: 0    solifenacin (VESICARE) 5 mg tablet, TAKE TWO TABLETS BY MOUTH EVERY DAY, Disp: , Rfl:     tadalafil (CIALIS) 20 MG tablet, Take 1 tablet (20 mg total) by mouth as needed for erectile dysfunction for up to 1010 doses, Disp: 10 tablet, Rfl: 1    Current Facility-Administered Medications:     iohexol (OMNIPAQUE) 300 mg/mL injection 50 mL, 50 mL, Epidural, Once, Jacky Dean DO    lidocaine (PF) (XYLOCAINE-MPF) 1 % injection 5 mL, 5 mL, Other, Once, Jacky Dean DO    methylPREDNISolone acetate (DEPO-MEDROL) injection 160 mg, 160 mg, Epidural, Once, [employer], DO    Allergies   Allergen Reactions    Ace Inhibitors Cough     Cough    Magnesium Chloride Diarrhea    Oxycodone-Acetaminophen Diarrhea       Physical Exam:   General: Awake, Alert, Oriented x 3, Mood and affect appropriate  Respiratory: Respirations even and unlabored  Cardiovascular: Peripheral pulses intact; no edema  Musculoskeletal Exam:  Ambulates with a walker    ASA Score: II         Assessment:   1  Chronic bilateral low back pain without sciatica    2  Lumbar radiculitis    3  Herniated nucleus pulposus, L4-5    4  Spinal stenosis of lumbar region with neurogenic claudication    5   Lumbar post-laminectomy syndrome        Plan: Caudal GEORGETTE

## 2021-01-04 NOTE — DISCHARGE INSTRUCTIONS
Epidural Steroid Injection   WHAT YOU NEED TO KNOW:   An epidural steroid injection (GEORGETTE) is a procedure to inject steroid medicine into the epidural space  The epidural space is between your spinal cord and vertebrae  Steroids reduce inflammation and fluid buildup in your spine that may be causing pain  You may be given pain medicine along with the steroids  ACTIVITY  · Do not drive or operate machinery today  · No strenuous activity today - bending, lifting, etc   · You may resume normal activites starting tomorrow - start slowly and as tolerated  · You may shower today, but no tub baths or hot tubs  · You may have numbness for several hours from the local anesthetic  Please use caution and common sense, especially with weight-bearing activities  CARE OF THE INJECTION SITE  · If you have soreness or pain, apply ice to the area today (20 minutes on/20 minutes off)  · Starting tomorrow, you may use warm, moist heat or ice if needed  · You may have an increase or change in your discomfort for 36-48 hours after your treatment  · Apply ice and continue with any pain medication you have been prescribed  · Notify the [facility] if you have any of the following: redness, drainage, swelling, headache, stiff neck or fever above 100°F     SPECIAL INSTRUCTIONS  · Our office will contact you in approximately 7 days for a progress report  MEDICATIONS  · Continue to take all routine medications  · Our office may have instructed you to hold some medications  If you have a problem specifically related to your procedure, please call our office at [phone]  Problems not related to your procedure should be directed to your primary care physician

## 2021-01-11 NOTE — TELEPHONE ENCOUNTER
1st attempt  Unable to lm to cb with % improvement and pain level     Number busy      S/p Caudal GEORGETTE 1/4 F/u 1/21

## 2021-01-20 NOTE — TELEPHONE ENCOUNTER
Pt called in to confirm if he could get the vaccine  Today being that he is scheduled for the procedure  The pt was advised that he would need to reschedule the vaccine and come to the appt 01/21/2021  Please be advise thank you

## 2021-01-21 NOTE — H&P
History of Present Illness: The patient is a 82 y o  male who presents with complaints of low back and leg pain      Patient Active Problem List   Diagnosis    Erectile dysfunction    Urge incontinence of urine    Benign prostatic hyperplasia with nocturia    Recurrent urinary tract infection    Cervical spinal stenosis    Lumbar radiculitis    Spastic gait    Urgency of urination    Chronic pain syndrome    Chronic bilateral low back pain without sciatica    Herniated nucleus pulposus, L4-5    Spinal stenosis of lumbar region with neurogenic claudication    Lumbar post-laminectomy syndrome       Past Medical History:   Diagnosis Date    Acute cystitis without hematuria     Atherosclerotic heart disease     Erectile dysfunction     Flaccid neuropathic bladder, not elsewhere classified     Hypercholesteremia     Hypertension     Muscle spasms of neck     Urge incontinence of urine     Urinary retention     UTI (urinary tract infection)        Past Surgical History:   Procedure Laterality Date    CERVICAL SPINE SURGERY  2010    CYSTOSCOPY  02/08/2017    LUMBAR SPINE SURGERY  2006         Current Outpatient Medications:     amLODIPine (NORVASC) 5 mg tablet, , Disp: , Rfl:     aspirin 325 mg tablet, Take 325 mg by mouth daily, Disp: , Rfl:     cephalexin (KEFLEX) 500 mg capsule, TAKE 1 CAPSULE BY MOUTH EVERY MORNING, Disp: , Rfl:     citalopram (CeleXA) 40 mg tablet, daily, Disp: , Rfl:     DULoxetine (CYMBALTA) 60 mg delayed release capsule, Take 60 mg by mouth daily, Disp: , Rfl:     losartan (COZAAR) 100 MG tablet, daily, Disp: , Rfl:     Magnesium Oxide 400 MG CAPS, daily, Disp: , Rfl:     methocarbamol (ROBAXIN) 500 mg tablet, Take 500 mg by mouth 3 (three) times a day as needed, Disp: , Rfl:     metoprolol tartrate (LOPRESSOR) 50 mg tablet, 2 (two) times a day, Disp: , Rfl:     Mirabegron ER 25 MG TB24, Take 25 mg by mouth daily, Disp: 30 tablet, Rfl: 3    niacin 500 mg tablet, daily, Disp: , Rfl:     nitrofurantoin (MACROBID) 100 mg capsule, Take 1 capsule (100 mg total) by mouth 2 (two) times a day, Disp: 10 capsule, Rfl: 0    Omega-3 Fatty Acids (FISH OIL PO), daily, Disp: , Rfl:     pravastatin (PRAVACHOL) 80 mg tablet, daily, Disp: , Rfl:     sildenafil (VIAGRA) 100 mg tablet, Take 1 tablet (100 mg total) by mouth daily as needed for erectile dysfunction, Disp: 3 tablet, Rfl: 0    solifenacin (VESICARE) 5 mg tablet, TAKE TWO TABLETS BY MOUTH EVERY DAY, Disp: , Rfl:     tadalafil (CIALIS) 20 MG tablet, Take 1 tablet (20 mg total) by mouth as needed for erectile dysfunction for up to 1010 doses, Disp: 10 tablet, Rfl: 1    Current Facility-Administered Medications:     iohexol (OMNIPAQUE) 300 mg/mL injection 50 mL, 50 mL, Epidural, Once, Jacky Dean DO    lidocaine (PF) (XYLOCAINE-MPF) 1 % injection 5 mL, 5 mL, Other, Once, Jacky Dean DO    methylPREDNISolone acetate (DEPO-MEDROL) injection 80 mg, 80 mg, Epidural, Once, [employer], DO    Allergies   Allergen Reactions    Ace Inhibitors Cough     Cough    Magnesium Chloride Diarrhea    Oxycodone-Acetaminophen Diarrhea       Physical Exam:   General: Awake, Alert, Oriented x 3, Mood and affect appropriate  Respiratory: Respirations even and unlabored  Cardiovascular: Peripheral pulses intact; no edema  Musculoskeletal Exam:  Walks with a cane    ASA Score: II         Assessment:   1  Chronic bilateral low back pain without sciatica    2  Lumbar radiculitis    3  Herniated nucleus pulposus, L4-5    4  Spinal stenosis of lumbar region with neurogenic claudication    5   Lumbar post-laminectomy syndrome        Plan: left L4 left L5 TFESI

## 2021-01-21 NOTE — DISCHARGE INSTRUCTIONS
Epidural Steroid Injection   WHAT YOU NEED TO KNOW:   An epidural steroid injection (GEORGETTE) is a procedure to inject steroid medicine into the epidural space  The epidural space is between your spinal cord and vertebrae  Steroids reduce inflammation and fluid buildup in your spine that may be causing pain  You may be given pain medicine along with the steroids  ACTIVITY  · Do not drive or operate machinery today  · No strenuous activity today - bending, lifting, etc   · You may resume normal activites starting tomorrow - start slowly and as tolerated  · You may shower today, but no tub baths or hot tubs  · You may have numbness for several hours from the local anesthetic  Please use caution and common sense, especially with weight-bearing activities  CARE OF THE INJECTION SITE  · If you have soreness or pain, apply ice to the area today (20 minutes on/20 minutes off)  · Starting tomorrow, you may use warm, moist heat or ice if needed  · You may have an increase or change in your discomfort for 36-48 hours after your treatment  · Apply ice and continue with any pain medication you have been prescribed  · Notify the [facility] if you have any of the following: redness, drainage, swelling, headache, stiff neck or fever above 100°F     SPECIAL INSTRUCTIONS  · Our office will contact you in approximately 7 days for a progress report  MEDICATIONS  · Continue to take all routine medications  · Our office may have instructed you to hold some medications  If you have a problem specifically related to your procedure, please call our office at [phone]  Problems not related to your procedure should be directed to your primary care physician

## 2021-01-29 NOTE — TELEPHONE ENCOUNTER
1st attempt  Lm to cb with % improvement and pain level           S/p Lt L4 Lt L5 TFEST 1/21 F/u 2/4

## 2021-02-04 NOTE — PROGRESS NOTES
Virtual Brief Visit    Assessment/Plan:     continue with Lyrica as prescribed by family doctor I did discuss with him if the family doctor like us to take this over we have no problem doing that  We briefly discussed the option of a spinal cord stimulator, if he is interested he could call the office at any time to schedule a visit to discuss this option with Oren as both his caudal and left L4 and L5 transforaminal epidural steroid injections have failed to provide him any benefit  Patient was agreeable verbalized understanding  Follow-up as needed    Problem List Items Addressed This Visit        Nervous and Auditory    Lumbar radiculitis - Primary       Musculoskeletal and Integument    Herniated nucleus pulposus, L4-5       Other    Chronic pain syndrome    Spinal stenosis of lumbar region with neurogenic claudication    Lumbar post-laminectomy syndrome                Reason for visit is  Procedure follow-up  Chief Complaint   Patient presents with    Virtual Brief Visit        Encounter provider Jordin Chávez, [facility]    Provider located at [address] [facility]  [address]  [phone]    Recent Visits  No visits were found meeting these conditions  Showing recent visits within past 7 days and meeting all other requirements     Today's Visits  Date Type Provider Dept   02/04/21 [address] [place] [address] Drive, [facility]   Showing today's visits and meeting all other requirements     Future Appointments  No visits were found meeting these conditions  Showing future appointments within next 150 days and meeting all other requirements        After connecting through telephone, the patient was identified by name and date of birth  Arturo Beat was informed that this is a telemedicine visit and that the visit is being conducted through telephone  My office door was closed   No one else was in the room   He acknowledged consent and understanding of privacy and security of the platform  The patient has agreed to participate and understands he can discontinue the visit at any time  Patient is aware this is a billable service  Subjective    Carolyn Mattson is a 82 y o  male  Who presents for follow-up related to his chronic left low back and leg pain  Today he rates his pain 5/10  He is status post a left L4 and L5 transforaminal epidural steroid injection on January 21, 2021 with Dr Dean  patient tells me that he had not experienced any relief up until about today he noticed his pain starting to improve however he was recently started on Lyrica 4 days ago he takes 75 mg twice a day and this was initiated by his family doctor  Prior to this injection he had a caudal epidural injection on January 4, 2021 also with no relief         Past Medical History:   Diagnosis Date    Acute cystitis without hematuria     Atherosclerotic heart disease     Erectile dysfunction     Flaccid neuropathic bladder, not elsewhere classified     Hypercholesteremia     Hypertension     Muscle spasms of neck     Urge incontinence of urine     Urinary retention     UTI (urinary tract infection)        Past Surgical History:   Procedure Laterality Date    CERVICAL SPINE SURGERY  2010    CYSTOSCOPY  02/08/2017    LUMBAR SPINE SURGERY  2006       Current Outpatient Medications   Medication Sig Dispense Refill    amLODIPine (NORVASC) 5 mg tablet       aspirin 325 mg tablet Take 325 mg by mouth daily      cephalexin (KEFLEX) 500 mg capsule TAKE 1 CAPSULE BY MOUTH EVERY MORNING      citalopram (CeleXA) 40 mg tablet daily      DULoxetine (CYMBALTA) 60 mg delayed release capsule Take 60 mg by mouth daily      losartan (COZAAR) 100 MG tablet daily      Magnesium Oxide 400 MG CAPS daily      methocarbamol (ROBAXIN) 500 mg tablet Take 500 mg by mouth 3 (three) times a day as needed      metoprolol tartrate (LOPRESSOR) 50 mg tablet 2 (two) times a day      Mirabegron ER 25 MG TB24 Take 25 mg by mouth daily 30 tablet 3    niacin 500 mg tablet daily      nitrofurantoin (MACROBID) 100 mg capsule Take 1 capsule (100 mg total) by mouth 2 (two) times a day 10 capsule 0    Omega-3 Fatty Acids (FISH OIL PO) daily      pravastatin (PRAVACHOL) 80 mg tablet daily      sildenafil (VIAGRA) 100 mg tablet Take 1 tablet (100 mg total) by mouth daily as needed for erectile dysfunction 3 tablet 0    solifenacin (VESICARE) 5 mg tablet TAKE TWO TABLETS BY MOUTH EVERY DAY      tadalafil (CIALIS) 20 MG tablet Take 1 tablet (20 mg total) by mouth as needed for erectile dysfunction for up to 1010 doses 10 tablet 1     No current facility-administered medications for this visit  Allergies   Allergen Reactions    Ace Inhibitors Cough     Cough    Magnesium Chloride Diarrhea    Oxycodone-Acetaminophen Diarrhea       Review of Systems    There were no vitals filed for this visit  I spent 5 minutes directly with the patient during this visit   It was my intent to perform this visit via video technology but the patient was not able to do a video connection so the visit was completed via audio telephone only  VIRTUAL VISIT DISCLAIMER    Kareem Noonan acknowledges that he has consented to an online visit or consultation  He understands that the online visit is based solely on information provided by him, and that, in the absence of a face-to-face physical evaluation by the physician, the diagnosis he receives is both limited and provisional in terms of accuracy and completeness  This is not intended to replace a full medical face-to-face evaluation by the physician  Kareem Noonan understands and accepts these terms

## 2021-03-12 NOTE — TELEPHONE ENCOUNTER
Patient managed by Dr Danika Kelley at the [facility] office  He has a history of urge incontinence and BPH  Last seen in the office on 8-  Patient called with complaints of increased urgency and frequency  Pt also has some burning with urination  He denies fever, chills or flank pain at this time  Pt would like to go for urine testing to rule out UTI  Advised patient that since it is the weekend we will not have the results of testing until Monday  Pt is aware  Orders placed for UA C&S to rule out urinary tract infection  Office will follow up as results become available usually within 48-72 hours  Patient encouraged to hydrate well with water and avoid bladder irritants  Patient instructed to call back with worsening symptoms, fever, chills, blood in the urine or difficulty urinating

## 2021-03-12 NOTE — TELEPHONE ENCOUNTER
Pt managed by Dr Beckford,pt called stating he's having increased urinary frequency could he prescribed something

## 2021-03-12 NOTE — TELEPHONE ENCOUNTER
Call placed to patient and spoke with his wife  She informed me that patient is currently sleeping and she does not want to wake him up  Informed wife that I was calling to further assess his symptoms and try to provide recommendations since the weekend is approaching  Wife understands but stated she will not wake him at this time  He will call the office on Monday to speak with staff member

## 2021-03-16 NOTE — TELEPHONE ENCOUNTER
Patient called in and said he would like his urine results that he got done at [facility]  Tried updating care everywhere but didn't see them come through  Called over to [facility] and they are faxing the results

## 2021-03-16 NOTE — TELEPHONE ENCOUNTER
Called patient to inform him of results of recent urine testing ,informed him we sent antibiotics to pharmacy    Patient was agreeable to taking them as instructed and calling us back with any other problems

## 2021-03-22 NOTE — TELEPHONE ENCOUNTER
Patient called this morning stating he is still experiencing urinary urgency  Please call him at home [phone]

## 2021-03-22 NOTE — TELEPHONE ENCOUNTER
Called pt  He finished his ABX this morning and does feel better, but urinary urgency is bothersome  He said Dr Chel Lomax offered him a RX at his last OV, but he declined  He now wants to try medication

## 2021-03-25 NOTE — TELEPHONE ENCOUNTER
Called and spoke to pt's wife, she states pt was sleeping and she didn't want to wake him  I asked her if pt was taking both Vesicare and Myrbetriq and she didn't know  She wrote down the medications, I spelled the generic drugs for her, and she will ask pt when he wakes up  Asked her to have him call back with information  If he is taking both of them and still having symptoms, he needs to make appt to be evaluated  We can have him see an AP next week, or Dr Roberto Gutierrez in a few months, first available, if he wants to wait

## 2021-03-25 NOTE — TELEPHONE ENCOUNTER
Patient managed by Marlen Graham is calling to get medication for urgency sent to pharmacy  see previous note

## 2021-03-25 NOTE — TELEPHONE ENCOUNTER
Patient is currently managed on VESIcare and Myrbetriq    If his symptoms and will controlling his medications he will need to have a follow-up for in the office for further evaluation

## 2021-03-26 NOTE — TELEPHONE ENCOUNTER
Call placed to patient and spoke with him  Pt stated that he stopped taking both Vesicare and Mirabegron last week  Pt would like to know what he should do next and the recommendations of the provider

## 2021-03-26 NOTE — TELEPHONE ENCOUNTER
Patient returned call checking on status of message  Patient advised that he does not want to wait over the weekend  Patient can be reached at  [phone]  Please advise

## 2021-03-29 NOTE — TELEPHONE ENCOUNTER
Called pt and spoke to pt's wife due to her  sleeping  Advised pt to go for another UC to test resolution of UTI from 3/13/21 per Ezekiel Gomes  UC order faxed to [facility] at [phone] per request     Pt's wife stated she was dissatisfied with follow up from our office with regard to her 's care  She felt it was very disjointed  I advised it was better we speak to pt to assess his symptoms and that pt should make an appt to come in and discuss their concerns about treatment  Offered appt with AP this week  Advised Dr Curt Lopez was booked until May  Pt's wife stated she would speak to pt about scheduling

## 2021-03-29 NOTE — TELEPHONE ENCOUNTER
Pt scheduled with Dr Liza Smith on 4/5/21 to discuss urinary frequency; recurrent UTI's and poss cysto

## 2021-04-05 NOTE — TELEPHONE ENCOUNTER
DUPLICATE ENCOUNTER  Message copied and pasted into original phone encounter dated 3/12/21  Please refer to that encounter for documentation  DO NOT DOCUMENT FURTHER ON THIS ENCOUNTER

## 2021-04-05 NOTE — TELEPHONE ENCOUNTER
STEPHANIE Romo         4/2/21 11:35 PM  Note      Urine culture positive for Enterococcus-prescription provided for Keflex

## 2021-04-05 NOTE — TELEPHONE ENCOUNTER
Culture andSensitivity received -Keflex will not be helpful in his particular situation  Will need tip prescribed a different antibiotic    Prescription for Levaquin sent to pharmacy

## 2021-04-05 NOTE — TELEPHONE ENCOUNTER
STEPHANIE Rea         4/5/21 8:03 AM  Note     Culture andSensitivity received -Keflex will not be helpful in his particular situation  Will need tip prescribed a different antibiotic    Prescription for Levaquin sent to pharmacy

## 2021-04-05 NOTE — TELEPHONE ENCOUNTER
Spoke to Pt's Wife and she was given the Advise of Dr Roberto Gutierrez to cancel his Cysto and Reschedule due to his Infection  We will reschedule Appointment at a later date for his Cysto and Wife will be notified

## 2021-04-16 NOTE — TELEPHONE ENCOUNTER
Tried to call pt but his line is busy  Pt's results are:   Urine Culture <10,000 cfu/ml     Mixed Contaminants X2        Pt does not have a UTI, just a low count of skin bacteria that does not need to be treated

## 2021-04-16 NOTE — TELEPHONE ENCOUNTER
Patient wife called Mr Maxine Payne manager asking for results of labs  LUIS requesting we contact patient

## 2021-05-06 NOTE — TELEPHONE ENCOUNTER
Patient managed by Dr Katja Joe [facility] Liza Coffey visiting nurse called in stating patient informed her he has been having blood in his urine since last  Liza Coffey is with patient at the moment  Liza Coffey stated patient has no other symptoms, vitals are good  Liza Coffey just need a recommendation   Liza Coffey can be reached at [phone]

## 2021-05-06 NOTE — TELEPHONE ENCOUNTER
Call placed to [place] [facility] and spoke with her and the patient  Pt stated that starting last night he had an episode of hematuria  This morning when he urinated his urine was clear and no blood was present  Pt denies burning with urination, frequency, urgency, flank pain or chills  Encouraged patient to increase his hydration with water escpecially to help flush the bladder  Pt agrees with these recommendations and will continue to monitor his symptoms and if they worsen he will contact the office  [place] [facility] NICHOLSON was on the phone call and will continue to monitor patient's symptoms and will contact the office with any worsening symptoms

## 2021-05-07 NOTE — TELEPHONE ENCOUNTER
Patients wife Marlon Harman called in stating patient was experiencing a lot of discomfort last time  They just need some direction on what to do  Marlon Harman requested if they can go to the [place] office because it is hard for them to come to [facility]   Marlon Harman can be reached at [phone]

## 2021-05-07 NOTE — TELEPHONE ENCOUNTER
Called patient spoke with wife - she is told that the order for CT scan and urine testing has been placed  She is given central scheduling and she will call to schedule the CT scan

## 2021-05-10 NOTE — TELEPHONE ENCOUNTER
Patient's wife called stating patient was discharged from hospital today  Patient had ct scan at hospital   Patient has a johnson catheter and she would like to set up appointment

## 2021-05-11 NOTE — TELEPHONE ENCOUNTER
Patients wife called in and said that patient has a catheter and when she changed his bag this morning there was bright red blood and a number of clots  Please advise

## 2021-05-11 NOTE — TELEPHONE ENCOUNTER
Spoke to pt's wife  They are requesting to be seen in [place] office today for gross hematuria with clots in pts johnson drainage bag s/p hospital admission on 5/8 for bladder mass  Pt seen by [facility] last night  CT done revealing bladder mass  Pt does not wish to be followed by [facility]  Previous pt of Dr Abraham Heller  Pt's wife stated she wishes pt to be seen with a provider in the [place] office  Forwarding to [place] clinical [place] to call pt for triage and appt if needed

## 2021-05-11 NOTE — TELEPHONE ENCOUNTER
Called Cherry Stevens back and scheduled Simi Perdue for a Cysto on Friday with Dr Mallory Eason   Again stressed if he is having bright red blood should go to ER   If they decide to got to ER and don't need appointment she will call an cancel

## 2021-05-11 NOTE — TELEPHONE ENCOUNTER
Spoke with Divya Suero  They had been to [facility] 5/8 -5/9- imaging completed and showed a bladder mass, which caused him to go into retention  johnson is showing bright red blood  They have a [facility] nurse that has been coming to home  Patient has seen Dr Sg Lassiter in the past and they are refusing to see him - He is scheduled there 6/1 for a Cysto  Explained that if he is having bright blood in his catheter they should go to ER  She is adamant about a Cysto in the office  I said that appointment would be to take a sample of bladder mass to see if it was cancerous and then scheduled surgery  Again mentioned that Appointment with Dr Gregory Snow  Would be appropriate and she doesn't want to take it  She wants to follow up in [place]  Will call back with any other changes but again stressed if active blood should go to ER and also that Cysto with Dr Gregory Snow would be appropraite

## 2021-05-14 NOTE — H&P (VIEW-ONLY)
5/14/2021    Osvaldo Mora  1935  [id number]        Assessment    Bladder mass    Plan    We discussed the findings  I would like to see CT scan disc from [facility] and they will obtain this  Patient seems require at least cystoscopy with biopsy and potentially transurethral resection depending on appearance in the operating room  We discussed technique, risks and benefits of this procedure  They understand risk of bladder perforation  He will require Cordova catheter afterwards as well due to urinary retention  We discussed concern based on CT scan report of possible invasion of mass through the bladder wall  If this is true, he will require more aggressive therapies than trans urethral resection such as chemo / radiation as he is likely not a good candidate for cystectomy  For now, consent was obtained for cystoscopy, transurethral resection bladder tumor, bladder biopsy  Total visit time was 45 minutes of which over 50% was spent on counseling  History of Present Illness  [place] is a 82 y o  male  Previously known to Dr Raven Morgan  He went to [facility] with clot urinary retention after experiencing hematuria for a while  He has a very long history of smoking although does not quantify specifically how much  He says he has quit  He is admitted overnight with clot retention and treated with Cordova catheter  There is no irrigation  They had planned surgical intervention however they asked to be discharged and follow up at [facility] instead  CT scan was done  Report is available but I cannot see any images  This reveals a large 7 cm bladder mass superiorly with evidence of invasion through the bladder wall  Presents today with Cordova catheter in place  Is draining clear yellow urine  He agreed to proceed with cystoscopy today  His Cordova catheter was removed           Cystoscopy     Date/Time 5/14/2021 9:44 AM     Performed by  Sly Evans MD     Authorized by Amada Viramontes MD          Procedure Details:  Procedure type: cystoscopy    Additional Procedure Details:   Patient was prepped with Betadine  2% lidocaine jelly was instilled per urethra  The 16 Persian flexible cystoscope was placed  The prostate was not noted to be enlarged nor obstructing  Evaluation of the bladder revealed significant area of fluffy white tissue superiorly consistent with potential squamous metaplasia or necrotic tissue  There was a bulge emanating posteriorly which had the appearance of catheter inflammation versus mass  This did not appear to be papillary carcinoma  Ureteral orifices could not be visualized due to the abnormal tissue  At the conclusion, patient was unable to void and Cordova catheter was replaced  Review of Systems  Review of Systems   Constitutional: Negative  HENT: Negative  Respiratory: Negative  Cardiovascular: Negative  Gastrointestinal: Negative  Genitourinary:        As per HPI   Musculoskeletal: Negative  Skin: Negative  Neurological: Negative  Hematological: Negative            Past Medical History  Past Medical History:   Diagnosis Date    Acute cystitis without hematuria     Atherosclerotic heart disease     Erectile dysfunction     Flaccid neuropathic bladder, not elsewhere classified     Hypercholesteremia     Hypertension     Muscle spasms of neck     Urge incontinence of urine     Urinary retention     UTI (urinary tract infection)        Past Social History  Past Surgical History:   Procedure Laterality Date    CERVICAL SPINE SURGERY  2010    CYSTOSCOPY  02/08/2017    CYSTOSCOPY  05/14/2021    LUMBAR SPINE SURGERY  2006       Past Family History  Family History   Problem Relation Age of Onset    Cancer Mother     Heart disease Father        Past Social history  Social History     Socioeconomic History    Marital status: /Civil Union     Spouse name: Not on file    Number of children: Not on file    Years of education: Not on file    Highest education level: Not on file   Occupational History    Occupation:      Comment: Retired   Social Needs    Financial resource strain: Not on file    Food insecurity     Worry: Not on file     Inability: Not on file   [employer] needs     Medical: Not on file     Non-medical: Not on file   Tobacco Use    Smoking status: Never Smoker    Smokeless tobacco: Never Used   Substance and Sexual Activity    Alcohol use: No    Drug use: No    Sexual activity: Not on file   Lifestyle    Physical activity     Days per week: Not on file     Minutes per session: Not on file    Stress: Not on file   Relationships    Social connections     Talks on phone: Not on file     Gets together: Not on file     Attends Hindu service: Not on file     Active member of club or organization: Not on file     Attends meetings of clubs or organizations: Not on file     Relationship status: Not on file    Intimate partner violence     Fear of current or ex partner: Not on file     Emotionally abused: Not on file     Physically abused: Not on file     Forced sexual activity: Not on file   Other Topics Concern    Not on file   Social History Narrative    Not on file     Social History     Tobacco Use   Smoking Status Never Smoker   Smokeless Tobacco Never Used       Current Medications  Current Outpatient Medications   Medication Sig Dispense Refill    amLODIPine (NORVASC) 5 mg tablet       amoxicillin-clavulanate (AUGMENTIN) 875-125 mg per tablet       DULoxetine (CYMBALTA) 60 mg delayed release capsule Take 60 mg by mouth daily      losartan (COZAAR) 100 MG tablet daily      Magnesium Oxide 400 MG CAPS daily      metoprolol tartrate (LOPRESSOR) 50 mg tablet 2 (two) times a day      niacin 500 mg tablet daily      Omega-3 Fatty Acids (FISH OIL PO) daily      pravastatin (PRAVACHOL) 80 mg tablet daily      aspirin 325 mg tablet Take 325 mg by mouth daily      ciprofloxacin (CIPRO) 500 mg tablet ciprofloxacin 500 mg tablet   take 1 tablet by mouth every 12 hours for 7 days      citalopram (CeleXA) 40 mg tablet daily      Diclofenac Sodium (VOLTAREN) 1 % Apply 1 application topically 4 (four) times a day      methocarbamol (ROBAXIN) 500 mg tablet Take 500 mg by mouth 3 (three) times a day as needed      Mirabegron ER 25 MG TB24 Take 25 mg by mouth daily (Patient not taking: Reported on 5/14/2021) 30 tablet 3    nitrofurantoin (MACROBID) 100 mg capsule take 1 capsule by mouth twice a day for 5 days (Patient not taking: Reported on 5/14/2021) 10 capsule 0    pregabalin (LYRICA) 75 mg capsule Take 75 mg by mouth 3 (three) times a day      sildenafil (VIAGRA) 100 mg tablet Take 1 tablet (100 mg total) by mouth daily as needed for erectile dysfunction (Patient not taking: Reported on 5/14/2021) 3 tablet 0    solifenacin (VESICARE) 5 mg tablet TAKE TWO TABLETS BY MOUTH EVERY DAY      tadalafil (CIALIS) 20 MG tablet Take 1 tablet (20 mg total) by mouth as needed for erectile dysfunction for up to 1010 doses (Patient not taking: Reported on 5/14/2021) 10 tablet 1     No current facility-administered medications for this visit  Allergies  Allergies   Allergen Reactions    Ace Inhibitors Cough     Cough    Magnesium Chloride Diarrhea    Oxycodone-Acetaminophen Diarrhea       Past Medical History, Social History, Family History, medications and allergies were reviewed  Vitals  Vitals:    05/14/21 0910   BP: 132/62   BP Location: Left arm   Patient Position: Sitting   Cuff Size: Adult   Pulse: 62   Weight: 75 8 kg (167 lb)   Height: 5' 8" (1 727 m)       Physical Exam  Physical Exam  Vitals signs reviewed  Constitutional:       Appearance: He is well-developed  HENT:      Head: Normocephalic and atraumatic  Eyes:      Conjunctiva/sclera: Conjunctivae normal    Cardiovascular:      Rate and Rhythm: Normal rate and regular rhythm  Pulmonary:      Effort: Pulmonary effort is normal       Breath sounds: Normal breath sounds  Abdominal:      Palpations: Abdomen is soft  Musculoskeletal: Normal range of motion  Skin:     General: Skin is warm and dry  Neurological:      Mental Status: He is alert and oriented to person, place, and time     Psychiatric:         Mood and Affect: Mood normal            Results  No results found for: PSA  No results found for: GLUCOSE, CALCIUM, NA, K, CO2, CL, BUN, CREATININE  No results found for: WBC, HGB, HCT, MCV, PLT

## 2021-05-14 NOTE — PROGRESS NOTES
5/14/2021    Yanci Cohen  1935  [id number]        Assessment    Bladder mass    Plan    We discussed the findings  I would like to see CT scan disc from [facility] and they will obtain this  Patient seems require at least cystoscopy with biopsy and potentially transurethral resection depending on appearance in the operating room  We discussed technique, risks and benefits of this procedure  They understand risk of bladder perforation  He will require Cordova catheter afterwards as well due to urinary retention  We discussed concern based on CT scan report of possible invasion of mass through the bladder wall  If this is true, he will require more aggressive therapies than trans urethral resection such as chemo / radiation as he is likely not a good candidate for cystectomy  For now, consent was obtained for cystoscopy, transurethral resection bladder tumor, bladder biopsy  Total visit time was 45 minutes of which over 50% was spent on counseling  History of Present Illness  Otf Vázquez is a 82 y o  male  Previously known to Dr Nena Harris  He went to [facility] with clot urinary retention after experiencing hematuria for a while  He has a very long history of smoking although does not quantify specifically how much  He says he has quit  He is admitted overnight with clot retention and treated with Cordova catheter  There is no irrigation  They had planned surgical intervention however they asked to be discharged and follow [facility]  CT scan was done  Report is available but I cannot see any images  This reveals a large 7 cm bladder mass superiorly with evidence of invasion through the bladder wall  Presents today with Cordova catheter in place  Is draining clear yellow urine  He agreed to proceed with cystoscopy today  His Cordova catheter was removed           Cystoscopy     Date/Time 5/14/2021 9:44 AM     Performed by  Hortencia Ferugson MD     Authorized by Jamir Maldonado MD          Procedure Details:  Procedure type: cystoscopy    Additional Procedure Details:   Patient was prepped with Betadine  2% lidocaine jelly was instilled per urethra  The 16 Slovak flexible cystoscope was placed  The prostate was not noted to be enlarged nor obstructing  Evaluation of the bladder revealed significant area of fluffy white tissue superiorly consistent with potential squamous metaplasia or necrotic tissue  There was a bulge emanating posteriorly which had the appearance of catheter inflammation versus mass  This did not appear to be papillary carcinoma  Ureteral orifices could not be visualized due to the abnormal tissue  At the conclusion, patient was unable to void and Cordova catheter was replaced  Review of Systems  Review of Systems   Constitutional: Negative  HENT: Negative  Respiratory: Negative  Cardiovascular: Negative  Gastrointestinal: Negative  Genitourinary:        As per HPI   Musculoskeletal: Negative  Skin: Negative  Neurological: Negative  Hematological: Negative            Past Medical History  Past Medical History:   Diagnosis Date    Acute cystitis without hematuria     Atherosclerotic heart disease     Erectile dysfunction     Flaccid neuropathic bladder, not elsewhere classified     Hypercholesteremia     Hypertension     Muscle spasms of neck     Urge incontinence of urine     Urinary retention     UTI (urinary tract infection)        Past Social History  Past Surgical History:   Procedure Laterality Date    CERVICAL SPINE SURGERY  2010    CYSTOSCOPY  02/08/2017    CYSTOSCOPY  05/14/2021    LUMBAR SPINE SURGERY  2006       Past Family History  Family History   Problem Relation Age of Onset    Cancer Mother     Heart disease Father        Past Social history  Social History     Socioeconomic History    Marital status: /Civil Union     Spouse name: Not on file    Number of children: Not on file    Years of education: Not on file    Highest education level: Not on file   Occupational History    Occupation:      Comment: Retired   Social Needs    Financial resource strain: Not on file    Food insecurity     Worry: Not on file     Inability: Not on file   [employer] needs     Medical: Not on file     Non-medical: Not on file   Tobacco Use    Smoking status: Never Smoker    Smokeless tobacco: Never Used   Substance and Sexual Activity    Alcohol use: No    Drug use: No    Sexual activity: Not on file   Lifestyle    Physical activity     Days per week: Not on file     Minutes per session: Not on file    Stress: Not on file   Relationships    Social connections     Talks on phone: Not on file     Gets together: Not on file     Attends Advent service: Not on file     Active member of club or organization: Not on file     Attends meetings of clubs or organizations: Not on file     Relationship status: Not on file    Intimate partner violence     Fear of current or ex partner: Not on file     Emotionally abused: Not on file     Physically abused: Not on file     Forced sexual activity: Not on file   Other Topics Concern    Not on file   Social History Narrative    Not on file     Social History     Tobacco Use   Smoking Status Never Smoker   Smokeless Tobacco Never Used       Current Medications  Current Outpatient Medications   Medication Sig Dispense Refill    amLODIPine (NORVASC) 5 mg tablet       amoxicillin-clavulanate (AUGMENTIN) 875-125 mg per tablet       DULoxetine (CYMBALTA) 60 mg delayed release capsule Take 60 mg by mouth daily      losartan (COZAAR) 100 MG tablet daily      Magnesium Oxide 400 MG CAPS daily      metoprolol tartrate (LOPRESSOR) 50 mg tablet 2 (two) times a day      niacin 500 mg tablet daily      Omega-3 Fatty Acids (FISH OIL PO) daily      pravastatin (PRAVACHOL) 80 mg tablet daily      aspirin 325 mg tablet Take 325 mg by mouth daily      ciprofloxacin (CIPRO) 500 mg tablet ciprofloxacin 500 mg tablet   take 1 tablet by mouth every 12 hours for 7 days      citalopram (CeleXA) 40 mg tablet daily      Diclofenac Sodium (VOLTAREN) 1 % Apply 1 application topically 4 (four) times a day      methocarbamol (ROBAXIN) 500 mg tablet Take 500 mg by mouth 3 (three) times a day as needed      Mirabegron ER 25 MG TB24 Take 25 mg by mouth daily (Patient not taking: Reported on 5/14/2021) 30 tablet 3    nitrofurantoin (MACROBID) 100 mg capsule take 1 capsule by mouth twice a day for 5 days (Patient not taking: Reported on 5/14/2021) 10 capsule 0    pregabalin (LYRICA) 75 mg capsule Take 75 mg by mouth 3 (three) times a day      sildenafil (VIAGRA) 100 mg tablet Take 1 tablet (100 mg total) by mouth daily as needed for erectile dysfunction (Patient not taking: Reported on 5/14/2021) 3 tablet 0    solifenacin (VESICARE) 5 mg tablet TAKE TWO TABLETS BY MOUTH EVERY DAY      tadalafil (CIALIS) 20 MG tablet Take 1 tablet (20 mg total) by mouth as needed for erectile dysfunction for up to 1010 doses (Patient not taking: Reported on 5/14/2021) 10 tablet 1     No current facility-administered medications for this visit  Allergies  Allergies   Allergen Reactions    Ace Inhibitors Cough     Cough    Magnesium Chloride Diarrhea    Oxycodone-Acetaminophen Diarrhea       Past Medical History, Social History, Family History, medications and allergies were reviewed  Vitals  Vitals:    05/14/21 0910   BP: 132/62   BP Location: Left arm   Patient Position: Sitting   Cuff Size: Adult   Pulse: 62   Weight: 75 8 kg (167 lb)   Height: 5' 8" (1 727 m)       Physical Exam  Physical Exam  Vitals signs reviewed  Constitutional:       Appearance: He is well-developed  HENT:      Head: Normocephalic and atraumatic  Eyes:      Conjunctiva/sclera: Conjunctivae normal    Cardiovascular:      Rate and Rhythm: Normal rate and regular rhythm  Pulmonary:      Effort: Pulmonary effort is normal       Breath sounds: Normal breath sounds  Abdominal:      Palpations: Abdomen is soft  Musculoskeletal: Normal range of motion  Skin:     General: Skin is warm and dry  Neurological:      Mental Status: He is alert and oriented to person, place, and time     Psychiatric:         Mood and Affect: Mood normal            Results  No results found for: PSA  No results found for: GLUCOSE, CALCIUM, NA, K, CO2, CL, BUN, CREATININE  No results found for: WBC, HGB, HCT, MCV, PLT

## 2021-05-17 NOTE — TELEPHONE ENCOUNTER
Patient's wife called back to make sure its ok to drop off disc for ct scan at the office  Communicated with the nurse Jose Mehta) via teams  She stated ok to drop off ct disc and patient should take urine sample to lab  Patient verbalized understanding

## 2021-05-17 NOTE — TELEPHONE ENCOUNTER
Pt managed by Dr Benjamin, pt's wife called stating she obtained CT Scan disc which she wants to drop off [place] office this morning,also pt's urine was clear over weekend but this morning has pink tinge blood she obtained specimen she's questioning if she should also bring to office or take to lab? [phone]

## 2021-05-17 NOTE — TELEPHONE ENCOUNTER
Claudia NICHOLSON called and advised that she had a visit with the patient today and flushed his cathter and urine cleared to a light pink  She did advised that the patient did complain of his stack lock tugging and she re-adjusted it  She advised that if any questions or concerns she can be reached at [phone]  Please advise

## 2021-05-17 NOTE — TELEPHONE ENCOUNTER
I am currently holding 6/18/21 at the [facility] [place] for pt 's surgery with Dr Mariela Quinn  Once surgical orders are placed I will schedule pt

## 2021-05-17 NOTE — TELEPHONE ENCOUNTER
Pt's wife calling in regards to urine testing,she brought in sample to office to show color of urine,pt is not showing or having any symptoms should she still take specimen to lab or hold off ?

## 2021-05-17 NOTE — TELEPHONE ENCOUNTER
Pt wife dropped of disc so pt can be fast tracked per pt wife  She also dropped off urine which was a still dark red  I advised her that this was not a sterile sample due to it being in a jar and not a sterile cup  She understood, sterile cup with verbal instructions were given  I will add urine testing to pt chart  She verbally agreed and understood plan

## 2021-05-17 NOTE — TELEPHONE ENCOUNTER
Attempted to contact the patient  Line was busy x2  Unable to leave a message  Will try back at a later time to contact the patient

## 2021-05-18 NOTE — TELEPHONE ENCOUNTER
I returned Germania's phone call to inform her that I am waiting for surgical orders to be placed by Dr Niecy Richey verbalized understanding and will wait to hear back from me tomorrow after I speak with Dr Khurram Hammonds

## 2021-05-18 NOTE — TELEPHONE ENCOUNTER
Called and spoke with pt and his wife  They stated his visiting nurse advised to hold off on urine testing  Pt has one more day of antibiotics  PT denies fever, or chills and states his urine is clearing up

## 2021-05-18 NOTE — TELEPHONE ENCOUNTER
Patient's wife called and asked to speak to RAMONA PONCE (Surgical Scheduler)  Communicated with RAMONA PONCE via teams  She will call call patient back as soon as available

## 2021-05-19 NOTE — TELEPHONE ENCOUNTER
Call returned to patient, advised per provider that he should repeat urine testing one week after last dose of abx  Order is in [facility]  Patient/wife verbalized understanding

## 2021-05-19 NOTE — TELEPHONE ENCOUNTER
I spoke with pt and his wife this afternoon and scheduled pt for a cysto/TURBT with Dr Monique Castro at the [facility] on 6/7/21  I verbally went over all of pt s' pre op instructions and prep information with them  Pt is aware that he is scheduled for a cardiac clearance appt with Dr Shelbie Sarabia on 5/21/21 at 3:00 PM  Per pt 's request I am mailing him and copy of his surgical packet and instructed him to call our office with any questions or concerns regarding this procedure

## 2021-05-21 NOTE — TELEPHONE ENCOUNTER
Patient's wife is calling to ask questions about his upcoming surgery and has questions regarding catheter care  Requesting a call back this morning

## 2021-05-21 NOTE — TELEPHONE ENCOUNTER
Patient scheduled for TURBT 6/7  Wife called in  She reports the catheter was placed 5/14 and is concerned it would need changed prior to 6/7  Reviewed that catheters can remain in place 4-6 weeks so it should not need changed prior to surgery  She also notes the catheter was temporarily clogged then stringy clots passed  These then clogged the bag  I reviewed his vna likely has orders to routinely flush the catheter if needed for clots/clogs or they can call the office for appt with nursing for catheter clogging  Encouraged hydration  If bag is clogged with clots recommend just changing the bag  Now she reports for the past two days urine has been clear yellow  Wife also notes that outside images should be available for Dr Ashwin Lopez review  She questions if Dr Rafi Lucas would call after reviewing those if there are any changes to plan for TURBT  She notes that given continued bleeding pcp would prefer moving the surgery up if possible  I reviewed this is likely the soonest available spot and noted it is a good sign he has not had further bleeding the past few days  Can certainly keep in mind for cancellations

## 2021-05-21 NOTE — TELEPHONE ENCOUNTER
Patient wife called and advised that she would like to go over the CT results and has cathter questions  Please advise

## 2021-05-21 NOTE — TELEPHONE ENCOUNTER
Returned call to wife and left message returning her call  Awaiting call back for further discussion

## 2021-05-24 NOTE — TELEPHONE ENCOUNTER
Called and left a message for patient's wife  Reviewed Dr Lev Hudson just wanted the chance to review the CT images  There is no change to the plan  He will proceed with turbt as scheduled  Encouraged her to call back with questions or concerns

## 2021-05-28 NOTE — TELEPHONE ENCOUNTER
Macrobid sent to pharmacy on file for positive UA  Culture unavailable  UA was collected by VNA  Patient is bedridden and unable to collect new sample  Please encourage patient to hydrate with plenty of water and complete entire course of antibiotics   Thank you

## 2021-05-28 NOTE — TELEPHONE ENCOUNTER
Pt's wife calling again regarding urinalysis results care everywhere [facility] and needing antibiotic today as pt is scheduled for procedure 6/7

## 2021-05-28 NOTE — TELEPHONE ENCOUNTER
Patient's wife called stating patient had a urine test and they saw results on my chart and would like to have some call them back with what it all mean

## 2021-05-28 NOTE — TELEPHONE ENCOUNTER
Rg Wilkes called back and is very anxious as  is bedridden and he is scheduled for surgery with dr Trung Mackey on 6/7 for a TURP and wants to make sure he is treated as hes saw lab work in check   Only a u/s was completed not a urine culture   Please advise

## 2021-05-28 NOTE — TELEPHONE ENCOUNTER
Spoke with Moo Gross and notified her that ABX was sent to [facility]  Told he he needs to hydrate and complete entire course of ABX   She understood

## 2021-06-01 NOTE — PRE-PROCEDURE INSTRUCTIONS
Pre-Surgery Instructions:   Medication Instructions    losartan (COZAAR) 100 MG tablet Instructed patient per Anesthesia Guidelines  hold am of sx    Magnesium Oxide 400 MG CAPS Instructed patient per Anesthesia Guidelines  may take am of sx    metoprolol tartrate (LOPRESSOR) 50 mg tablet Instructed patient per Anesthesia Guidelines  take am of sx    mirtazapine (REMERON) 15 mg tablet Instructed patient per Anesthesia Guidelines  takes hs    niacin 500 mg tablet Instructed patient per Anesthesia Guidelines  hold am of sx    nitrofurantoin (MACROBID) 100 mg capsule Instructed patient per Anesthesia Guidelines  will be completed by sx    pravastatin (PRAVACHOL) 80 mg tablet Instructed patient per Anesthesia Guidelines  takes in pm    You will receive a phone call from hospital for arrival time  Please call surgeons office if any changes in your condition  Wear easy on/off clothing; consider type of surgery;  Valuables, jewelry, piercing's please keep at home  **COVID-19  education/surgical guidelines      Please: No contact lenses or eye make up, artificial eyelashes    Please secure transportation     Follow pre surgery showering or cleaning instructions as  Reviewed by nurse or surgeons office      Questions answered and concerns addressed

## 2021-06-07 PROBLEM — I10 HTN (HYPERTENSION): Status: ACTIVE | Noted: 2021-01-01

## 2021-06-07 PROBLEM — I71.40 AAA (ABDOMINAL AORTIC ANEURYSM) WITHOUT RUPTURE (HCC): Status: ACTIVE | Noted: 2021-01-01

## 2021-06-07 PROBLEM — I05.9 MITRAL VALVE DISORDER: Status: ACTIVE | Noted: 2021-01-01

## 2021-06-07 PROBLEM — I73.9 PAD (PERIPHERAL ARTERY DISEASE) (HCC): Status: ACTIVE | Noted: 2021-01-01

## 2021-06-07 PROBLEM — I71.4 AAA (ABDOMINAL AORTIC ANEURYSM) WITHOUT RUPTURE (HCC): Status: ACTIVE | Noted: 2021-01-01

## 2021-06-07 PROBLEM — I25.10 CAD (CORONARY ARTERY DISEASE): Status: ACTIVE | Noted: 2021-01-01

## 2021-06-07 PROBLEM — I44.0 AV BLOCK, 1ST DEGREE: Status: ACTIVE | Noted: 2021-01-01

## 2021-06-07 PROBLEM — E78.5 HLD (HYPERLIPIDEMIA): Status: ACTIVE | Noted: 2021-01-01

## 2021-06-07 NOTE — ANESTHESIA PREPROCEDURE EVALUATION
Procedure:  TRANSURETHRAL RESECTION OF BLADDER TUMOR (TURBT) (N/A Bladder)    Relevant Problems   CARDIO   (+) AAA (abdominal aortic aneurysm) without rupture (HCC)   (+) AV block, 1st degree   (+) CAD (coronary artery disease)   (+) HLD (hyperlipidemia)   (+) HTN (hypertension)   (+) Mitral valve disorder   (+) PAD (peripheral artery disease) (HCC)      MUSCULOSKELETAL   (+) Chronic bilateral low back pain without sciatica      NEURO/PSYCH   (+) Chronic pain syndrome      Other   (+) Benign prostatic hyperplasia with nocturia   (+) Cervical spinal stenosis   (+) Erectile dysfunction   (+) Herniated nucleus pulposus, L4-5   (+) Lumbar post-laminectomy syndrome   (+) Lumbar radiculitis   (+) Recurrent urinary tract infection   (+) Spastic gait   (+) Spinal stenosis of lumbar region with neurogenic claudication   (+) Urge incontinence of urine   (+) Urgency of urination        Physical Exam    Airway    Mallampati score: II  TM Distance: >3 FB  Neck ROM: full     Dental   Comment: Missing most teeth,     Cardiovascular  Rhythm: regular, Rate: normal, Cardiovascular exam normal    Pulmonary  Pulmonary exam normal Breath sounds clear to auscultation,     Other Findings        Anesthesia Plan  ASA Score- 3     Anesthesia Type- general with ASA Monitors  Additional Monitors:   Airway Plan:           Plan Factors-    Chart reviewed  EKG reviewed  Patient summary reviewed  Patient is not a current smoker  Patient instructed to abstain from smoking on day of procedure  Induction- intravenous  Postoperative Plan-     Informed Consent- Anesthetic plan and risks discussed with patient and spouse

## 2021-06-07 NOTE — OP NOTE
OPERATIVE REPORT  PATIENT NAME: Pilo Mishra    :  1935  MRN: [id number]  Pt Location: [facility] OR ROOM 05    SURGERY DATE: 2021    Surgeon(s) and Role:     * Diana Lam MD - Primary    Preop Diagnosis:  Bladder mass [N32 89]    Post-Op Diagnosis Codes: * Bladder mass [N32 89]    Procedure(s) (LRB):  TRANSURETHRAL RESECTION OF BLADDER TUMOR (TURBT) (N/A), LARGE GREATER THAN 7 CM    Specimen(s):  ID Type Source Tests Collected by Time Destination   1 : bladder tumor chips Tissue Urinary Bladder TISSUE EXAM Diana Lam MD 2021 [id number]        Estimated Blood Loss:   Minimal    Drains:  * No LDAs found *    Anesthesia Type:   General    Operative Indications:  Bladder mass [N32 89]      Operative Findings:  Large irregular bladder mass measuring greater than 7 cm posterior wall left side, relatively avascular, concerning for possible squamous malignancy  This was not completely resected as it seemed to be invasive into the wall  Additional synchronous lesions right side  Abnormal appearing fuzzy tissue  Complications:   None    Procedure and Technique:  Patient was identified, brought to the operating, placed on table supine position  After induction of general anesthesia his Cordova catheter was removed  He was placed in the dorsal lithotomy position and prepped and draped in usual sterile fashion  A formal time-out was performed  The 25 Niuean rigid cystoscope was introduced per urethra  Prostate is nonobstructing and relatively small  On entry into the bladder, there was a large irregular bladder mass greater than 7 cm on the posterior left side wall  Synchronous lesion on the right side and fluffy whitish tissue proximally  This did not have typical appearance of urothelial carcinoma, possibly squamous cell carcinoma  The resectoscope was then placed with visual obturator    Transurethral resection of bladder tumor was performed numerous large chips with the appearance of prostate tissue rather than typical urothelial malignancy  This was taken down until flush with the bladder however seemed to be continually invasive and incompletely resected  Is relatively avascular, however there was some persistent bleeding noted from several areas that seem to be deeper vessels  Rollerball was necessary in order to achieve hemostasis  At the conclusion there was excellent hemostasis  [de-identified] [place] Cordova catheter was placed with clear efflux  Bladder was irrigated and no evidence of hematuria  Patient tolerated this procedure well  I was present for the entire procedure    Patient Disposition:  PACU     Plan:  Discharge home with Cordova catheter  Outpatient void trial   Outpatient discussion of pathology and urination whether additional TURBT would be appropriate or alternative therapy          SIGNATURE: Richy Ken MD  DATE: June 7, 2021  TIME: 11:51 AM

## 2021-06-07 NOTE — INTERVAL H&P NOTE
H&P reviewed  After examining the patient I find no changes in the patients condition since the H&P had been written  Vitals:    06/07/21 0920   BP: (!) 173/77   Pulse: 79   Resp: 16   Temp: 97 9 °F (36 6 °C)   SpO2: 98%     Technique, risks, benefits TURBT reviewed, including perforation risk  Has johnson in place and will have johnson postop  Cardiac clearance reviewed

## 2021-06-08 NOTE — TELEPHONE ENCOUNTER
Post Op Note    Jair Nieves is a 82 y o  male s/p TRANSURETHRAL RESECTION OF BLADDER TUMOR (TURBT) (N/A), LARGE GREATER THAN 7 CM performed 6/7/2021  Jair Nieves is a patient of Dr Reji Chen and is seen at the [place] office  Plan:  Discharge home with Johnson catheter  Outpatient void trial   Outpatient discussion of pathology and urination whether additional TURBT would be appropriate or alternative therapy  How would you rate your pain on a scale from 1 to 10, 10 being the worst pain ever? 0  Have you had a fever? No  Have your bowel movements been regular? Not yet, encouraged increased water intake, taking Colace/Miralax  If the patient has a johnson- are you comfortable caring for your johnson? Yes Is it draining urine?  Yes      Scheduled void trial for 6/11 at [place] office and 6/22 post op with Dr Reji Chen

## 2021-06-08 NOTE — TELEPHONE ENCOUNTER
Patient managed by Dr Margarita Chester  Patient wife called in to scheduled post op appt   Patient can be reached at [phone]

## 2021-06-11 NOTE — PROGRESS NOTES
6/11/2021    Maral Wild  1935  [id number]    Diagnosis  Chief Complaint     Urinary Retention; s/ TURBT          Patient presents for void trial managed by Dr Krishan Bailey  Patient had follow up in 10 days for TURBT pathology discussion    Procedure Cordova removal/voiding trial    Cordova catheter removed after deflation of an intact balloon  Patient tolerated well  Encouraged patient to hydrate well and return this afternoon for post void residual   he knows he may return early if uncomfortable and unable to urinate  Patient agrees to this plan  Patient returned this afternoon  Patient states able to void  Patient voided 100 ml while in office  after PVR Bladder ultrasound performed and PVR measured 294ml      Recent Results (from the past 4 hour(s))   POCT Measure PVR    Collection Time: 06/11/21  2:59 PM   Result Value Ref Range    POST-VOID RESIDUAL VOLUME, ML  mL           Vitals:    06/11/21 0902   BP: 120/74   Pulse: 104   Weight: 68 kg (150 lb)   Height: 5' 8" (1 727 m)           Kevin Florez RN

## 2021-06-14 NOTE — TELEPHONE ENCOUNTER
Patient has invasive urothelial carcinoma bladder and needs follow-up to discuss and plan Oncology / possible radiation referral

## 2021-06-23 NOTE — TELEPHONE ENCOUNTER
New Patient  Form     Patient Details:     Pilo Mishra     1935     [id number]     Background Information:     [address] starts by opening a telephone encounter and gathering the following information     Who is calling to schedule and relationship? [address]   Who is the referring provider? [place]   Reason for Visit? New Diagnosis   Tumor Type?  bladder cancer   Is this Cancer or Non-Cancer? Cancer   Does the patient have confirmed tissue pathology with either biopsy or surgery? yes   When was biopsy/surgery (diagnosis made) and where 6/7   Does the Patient have a Urologist?  Yes   Name of Patient's Urologist Chano Sees   Did the patient have imaging done? If no why? yes   If yes, when and where  [facility]   Did patient have blood work done? If no why? yes   If Yes, when and where [facility]   *Please contact  Navigator for review if Urology is NOT making the referral to Med Onc, or     They answer No to Urologist and Pathology question  *     Scheduling Information:     Preferred [address] [place] [address], [place]   Are there any days the patient cannot be seen? No   Miscellaneous: na   After completing the above information, please route to finance, nurse navigation and clinical trials for review

## 2021-06-23 NOTE — PROGRESS NOTES
6/22/2021    Harrel Koyanagi  1935  [id number]        Assessment  Muscle invasive bladder cancer    Plan    Unfortunately, had to review with them the finding of high-grade urothelial carcinoma,  sarcomatoid, invasive into muscle layer  This was confirmed by second opinion at [facility]  We discussed the grading and staging of bladder cancer in detail  We further discussed treatment differences between stage I and stage II urothelial carcinoma the bladder  Discussed aggressive this indicated by high-grade sarcomatoid variant, as well as the physical appearance and tumor necrosis noted within the mass  We also discussed that the mass is likely incompletely resected based on OR findings and CT finding which indicated possible stage III disease  We discussed treatment options including 1  neoadjuvant chemotherapy followed by radical cystectomy, 2  primary chemotherapy/external beam radiation therapy, and 3  possible immunotherapy  We discussed risks and benefits of each  I am concerned that he may not be a candidate for surgery based on age, medical comorbidities, life expectancy, and performance status  In any event, recommend medical oncology evaluation as next step to determine appropriateness for chemotherapy, as well as potential regimen, and to discuss options including each mentioned above  They will consider their options  They did ask once again if I thought he is a good candidate for surgery and I said I would likely not recommend surgery for reasons mention, however it is an option for him  I have included Sreekanth Hanna our nurse navigator in correspondence and will await appointment for medical oncology evaluation  History of Present Illness  Letitia Bobby is a 82 y o  male  Who developed gross hematuria  [facility] scan done at [facility] revealed bladder thickening with mass at the dome    Office cystoscopy revealed abnormal appearance with thickening, consistent with possible inflammation versus mass  Not typical appearance of Urothelial carcinoma  He is recently status post TURBT  He and his wife return today to discuss results  Review of Systems  Review of Systems   Constitutional: Negative  HENT: Negative  Respiratory: Negative  Cardiovascular: Negative  Gastrointestinal: Negative  Genitourinary:        As per HPI   Musculoskeletal: Negative  Skin: Negative  Neurological: Negative  Hematological: Negative  Past Medical History  Past Medical History:   Diagnosis Date    AAA (abdominal aortic aneurysm) ([facility] [facility] )     Acute cystitis without hematuria     Atherosclerotic heart disease     Cancer [facility]     bladder    Carotid stenosis     Dizziness     Erectile dysfunction     Flaccid neuropathic bladder, not elsewhere classified     Cordova catheter in place     Hypercholesteremia     Hypertension     Hyponatremia     Mobility impaired     uses walker    Muscle spasms of neck     Myocardial infarction (HCC)     SIADH (syndrome of inappropriate ADH production) ([facility])     history of as per Care Everywhere    Urge incontinence of urine     Urinary retention     UTI (urinary tract infection)        Past Social History  Past Surgical History:   Procedure Laterality Date    CARDIAC SURGERY      stent x 2    CERVICAL SPINE SURGERY  2010    cervical hardware    CYSTOSCOPY  02/08/2017    CYSTOSCOPY  05/14/2021    LUMBAR SPINE SURGERY  2006    CA CYSTOURETHROSCOPY,FULGUR <0 5 CM LESN N/A 6/7/2021    Procedure: TRANSURETHRAL RESECTION OF BLADDER TUMOR (TURBT);   Surgeon: Julee Peabody, MD;  Location: [facility];  Service: Urology       Past Family History  Family History   Problem Relation Age of Onset    Cancer Mother     Heart disease Father        Past Social history  Social History     Socioeconomic History    Marital status: /Civil Union     Spouse name: Not on file    Number of children: Not on file    Years of education: Not on file    Highest education level: Not on file   Occupational History    Occupation:      Comment: Retired   Tobacco Use    Smoking status: Never Smoker    Smokeless tobacco: Never Used   Vaping Use    Vaping Use: Never used   Substance and Sexual Activity    Alcohol use: No    Drug use: No    Sexual activity: Not on file   Other Topics Concern    Not on file   Social History Narrative    Not on file     Social Determinants of Health     Financial Resource Strain:     Difficulty of Paying Living Expenses:    Food Insecurity:     Worried About [address] in the Last Year:     [address] [employer] in the Last Year:    Transportation Needs:     Lack of Transportation (Medical):      Lack of Transportation (Non-Medical):    Physical Activity:     Days of Exercise per Week:     Minutes of Exercise per Session:    Stress:     Feeling of Stress :    Social Connections:     Frequency of Communication with Friends and Family:     Frequency of Social Gatherings with Friends and Family:     Attends Judaism Services:     Active Member of Clubs or Organizations:     Attends Club or Organization Meetings:     Marital Status:    Intimate Partner Violence:     Fear of Current or Ex-Partner:     Emotionally Abused:     Physically Abused:     Sexually Abused:      Social History     Tobacco Use   Smoking Status Never Smoker   Smokeless Tobacco Never Used       Current Medications  Current Outpatient Medications   Medication Sig Dispense Refill    aspirin 325 mg tablet Take 325 mg by mouth daily      losartan (COZAAR) 100 MG tablet daily      Magnesium Oxide 400 MG CAPS daily      metoprolol tartrate (LOPRESSOR) 50 mg tablet 2 (two) times a day      mirtazapine (REMERON) 15 mg tablet Take 15 mg by mouth daily at bedtime      niacin 500 mg tablet daily      nystatin (MYCOSTATIN) powder Apply topically 2 (two) times a day 15 g 0    Omega-3 Fatty Acids (FISH OIL PO) daily      pravastatin (PRAVACHOL) 80 mg tablet daily       No current facility-administered medications for this visit  Allergies  Allergies   Allergen Reactions    Magnesium Chloride Diarrhea    Oxycodone-Acetaminophen Diarrhea    Ace Inhibitors Cough       Past Medical History, Social History, Family History, medications and allergies were reviewed  Vitals  Vitals:    06/22/21 1326   BP: 112/62   BP Location: Left arm   Patient Position: Sitting   Cuff Size: Adult   Pulse: 68       Physical Exam  Physical Exam  Vitals reviewed  Constitutional:       Appearance: He is well-developed  HENT:      Head: Normocephalic and atraumatic  Eyes:      Conjunctiva/sclera: Conjunctivae normal    Cardiovascular:      Rate and Rhythm: Normal rate  Pulmonary:      Effort: Pulmonary effort is normal    Abdominal:      Palpations: Abdomen is soft  Genitourinary:     Comments: No CVAT  Musculoskeletal:      Comments: Requires wheelchair due to gait dysfunction  Skin:     General: Skin is warm and dry  Neurological:      Mental Status: He is alert and oriented to person, place, and time     Psychiatric:         Mood and Affect: Mood normal            Results  No results found for: PSA  No results found for: GLUCOSE, CALCIUM, NA, K, CO2, CL, BUN, CREATININE  No results found for: WBC, HGB, HCT, MCV, PLT

## 2021-06-24 NOTE — TELEPHONE ENCOUNTER
Intake received  Benefits review in process   Pt outreach to follow    Per RTE pt has an active [employer] med repl that runs on a plan year effective 01/01/21  No deduct  Out of pocket $5900  met $1315 75    Pt has an appt 07/09/21 will revw for plan & f/u w/ins & pt

## 2021-06-28 NOTE — PROGRESS NOTES
Natacha Sun was diagnosed with muscle invasive bladder cancer and was referred to Medical Oncology  Dr Gerson Arizmendi does not feel he is a surgical candidate  Natacha Sun wanted clarification about his appointments and has not received an appointment for Radiation Oncology consult  Discussed with Dr Gerson Arizmendi  He ordered CT of the chest without contrast, CBC with diff, CMP and Radiation Oncology consult  Natacha Sun aware of orders  Made him aware that I would schedule CT of the chest and get back to him  Called central scheduling  Scheduled CT for 6/30/21  Eloina Lebron (wife) aware of the date , time, and location of CT  Made her aware that Urology is recommending urine culture  She reports that urine is between punch and tea color  She is concerned about his worsening fatigue, pain in his legs, decreased mobility and decreased appetite  Offered referral to Palliative care and criteria met  Wife requested Palliative Care referral  She stated that he does not have a fever  He was instructed to have blood work done before his medical oncology consult and that it can be done the same time as the urine culture  She was also advised to take him to the ER if he has worsening lethargy per STEPHANIE Cade recommendation (other encounter from today)  He was also advised to call Urology if he develops a fever  She was given my direct contact information and encouraged to call if he has questions or needs assistance

## 2021-06-28 NOTE — TELEPHONE ENCOUNTER
Patient managed by Dr Graeme Boast [facility] Patients wife Cheng Lencho called in to report patient stated having dark color blood in urine, patient is very tired spent the last two days in bed  Also, patient is scheduled Oncology consult   Cheng Musa can be reached at [phone]

## 2021-06-28 NOTE — TELEPHONE ENCOUNTER
Returning a call to Patient of Lev Hudson  Seen in our [address] [place], [place] [address] office    Diag: malignant neoplasm of overlapping sites of bladder/Urinary retention   Last office visit:6/22/21  Called this morning  to reach the Nurse Navigator  Notice a change in Urine color from yellow brownish red with clot present  X3days  Patient is hydrating  Urine is not lighting  Denies fevers or chills  -nausea or vomiting  Denies back/flank pain or burning with urination  + frequency+uregency  Contacted Yecenia Dejesus    She was ablel to speak with patient in reference to referral from dr Lev Hudson     Will send encounter to Provider about current symptoms for reccomendation

## 2021-06-28 NOTE — TELEPHONE ENCOUNTER
If patient is experiencing persistent gross hematuria and feelings of lethargy, would recommend visit to emergency department for further evaluation  He should otherwise obtain urine testing, and office visit with nurse to perform PVR assessment  Previous orders for urine testing remain in Epic

## 2021-06-28 NOTE — TELEPHONE ENCOUNTER
Spoke with pt to advise above  Lillian Crowley did speak to pt earlier as well  Her notes are in his chart  Advised pt to go to ER if symptoms worsen  PT and wife verbally agreed and understood

## 2021-06-30 PROBLEM — R53.0 NEOPLASTIC MALIGNANT RELATED FATIGUE: Status: ACTIVE | Noted: 2021-01-01

## 2021-06-30 PROBLEM — R63.0 LOSS OF APPETITE: Status: ACTIVE | Noted: 2021-01-01

## 2021-06-30 PROBLEM — C67.9 BLADDER CANCER (HCC): Status: ACTIVE | Noted: 2021-01-01

## 2021-07-01 NOTE — PROGRESS NOTES
Consult to Palliative and Supportive Care  Comfort Hinton 82 y o  male [id number]    ASSESSMENT & PLAN:  1  Malignant neoplasm of urinary bladder, unspecified site ([address] )    2  Loss of appetite    3  Neoplastic malignant related fatigue    4  Chronic bilateral low back pain without sciatica    5  Acute neck pain    6  Palliative care patient    7  Advanced care planning/counseling discussion    8  Goals of care, counseling/discussion           Patient c/o acute neck pain (though this is chronically recurring, x years) and chronic spinal pain which predates his malignancy by years and is unrelated to his palliative diagnosis  o Local injections have resolved his neck pain immediately (done in the ED on several occasions)  o Recommended conservative management (Tylenol, local topical solutions, ice/heat)  o Recommended he return to Spine & Pain and/or Physiatry for management of non-malignant spinal pain  Referred to [facility]   o He was prescribed methocarbamol by his PCP for this pain (muscle spasm elements); he may continue this   Patient has yet to meet Medical Oncology or Radiation Oncology but is interested in pursing disease-directed treatments for his bladder cancer   Recommended trial of PO corticosteroid (anorexia, fatigue, pain) but patient and his wife wish to discuss this w/ their PCP first    Continue mirtazapine for now   Patient could benefit from Nextnav  Counseling and literature provided  They will call [facility] if interested in moving forward, once they have registered  Will need another separate visit as the logistics, legality, adverse effects, etc would need to be discussed in greater detail   Patient and wife wish to wait until they have spoken w/ Oncology and/or registered for Nextnav before setting up a f/u visit   Patient has not completed any advanced healthcare directives  He accepts a copy of 5 Wishes along with counseling   ACPs may be reviewed in detail at next visit   Jules Atkinson Once patient established w/ Oncology, he can be referred to Oncology Nutrition  Literature on nutrition provided   Emotional support provided   Medication safety issues addressed - no driving under the influence of narcotics, watch for adverse effects including AMS and respiratory depression, keep medications stored in a safe/locked environment   Patient has completed the [address] vaccination series   Reviewed notes (Urology, Neurology, Neurosurgery / Spine & Pain, Pain Medicine, [facility] IM), labs (6/29/21: Cr 1 14, alb 2 8, Hb 11 2; A1c 6 0 on 11/24/21), imaging (6/30/21 CT chest read pending, 2/11/21 MRI thoracic spine)  Requested Prescriptions     Signed Prescriptions Disp Refills    acetaminophen (TYLENOL) 500 mg tablet 180 tablet 2     Sig: Take 2 tablets (1,000 mg total) by mouth 3 (three) times a day       There are no discontinued medications  Representatives have queried the patient's controlled substance dispensing history in the Prescription Drug Monitoring Program in compliance with regulations before I have prescribed any controlled substances  The prescription history is consistent with prescribed therapy and our practice policies  60 minutes were spent face to face with patient and wife Rinku Malhotra with greater than 50% of the time spent in counseling or coordination of care including discussions of symptom assessment and management, medication review and adjustment, psychosocial support, chart review, imaging review, lab review, advanced directives, goals of care and medical marijuana  All of the patient's questions were answered during this discussion  Return once registered for medical marijuana, or want to discuss goals of care, or cancer-related symptoms      SUBJECTIVE:  Chief Complaint   Patient presents with    Cancer     consult    Gait Problem    Counseling    Anorexia    Back Pain     non-malignant    Neck Pain     non-malignant    Loss of Appetite  Weight Loss    Fatigue        HPI    Amy Conteh is a 82 y o  male w/ muscle-invasive bladder cancer s/p TURBT, chronic non-malignant pain s/t cervical spinal stenosis w/ neurogenic claudication + lumbar radiculitis + lumbar post-laminectomy syndrome + L4-5 herniated nucleus pulposus  He follows w/ Dr Frankey Gulling (Urology), [facility] Pain Medicine / Spine & Pain (most recently Jorge Lusi Lujan on 2/4/21), Dr Sergey Arechiga (Physiatry)  He has not yet seen Medical Oncology or Radiation Oncology but has visits scheduled w/ Dr Ruth Helton and Dr Darren Perez  Malignancy diagnosis confirmed by [facility]  Per Urology, patient is not a recommended surgical candidate (but their 6/22/21 note states it is an option for him)  Patient was offered spinal stimulator by Pain Medicine in 02/2021 as TESI had not been of benefit  Patient is new to [facility] clinic; referred by Urology for symptom management  Patients primary complaint is acute neck pain - this is not a new issue, rather a recurring acute issue that he states has resolved quickly in the past when Emergency Department physicians provided a local injection (chart review shows IM injections of 15mg ketorolac on 8/28/20 and 8/8/19 at [facility] ED)  He also c/o chronic lumbar pain  These pains predate his malignancy by years, and there is no evidence of osseous metastasis on imaging  Patient was prescribed methocarbamol by his PCP but he has only tried that once today (prior to visit)  This had been par of his medication regimen in the past per chart review  He has not tried heat or ice, or topical medications  His back pain can be worse when ambulating  Patient endorses significant fatigue, and weakness of b/l LE  His appetite is low and he has lost 10 2% WTB in 6 months  He tries to supplement PO intake w/ Ensure  Discussed MMJ today (for appetite stimulation and relief of chronic pain); he expressed interest     Patient lives w/ his wife Fareed [place]   He has not yet completed any advanced directives  He has home health services (VNA, PT, etc) through [facility]  PDMP shows no concerns  The following portions of the medical history were reviewed: past medical history, problem list, medication list, and social history  Current Outpatient Medications:     aspirin 325 mg tablet, Take 325 mg by mouth daily, Disp: , Rfl:     losartan (COZAAR) 100 MG tablet, daily, Disp: , Rfl:     Magnesium Oxide 400 MG CAPS, daily, Disp: , Rfl:     methocarbamol (ROBAXIN) 500 mg tablet, Take 500 mg by mouth 3 (three) times a day as needed for pain (neck pain, muscle spasms), Disp: , Rfl:     metoprolol tartrate (LOPRESSOR) 50 mg tablet, 2 (two) times a day, Disp: , Rfl:     mirtazapine (REMERON) 15 mg tablet, Take 15 mg by mouth daily at bedtime, Disp: , Rfl:     niacin 500 mg tablet, daily, Disp: , Rfl:     nitrofurantoin (MACROBID) 100 mg capsule, Take 1 capsule (100 mg total) by mouth 2 (two) times a day, Disp: 10 capsule, Rfl: 0    nystatin (MYCOSTATIN) powder, Apply topically 2 (two) times a day, Disp: 15 g, Rfl: 0    Omega-3 Fatty Acids (FISH OIL PO), daily, Disp: , Rfl:     pravastatin (PRAVACHOL) 80 mg tablet, daily, Disp: , Rfl:     acetaminophen (TYLENOL) 500 mg tablet, Take 2 tablets (1,000 mg total) by mouth 3 (three) times a day, Disp: 180 tablet, Rfl: 2    Review of Systems   Constitutional: Positive for activity change, appetite change, fatigue, fever and unexpected weight change  Eyes: Negative for pain and redness  Endocrine: Negative for polydipsia and polyphagia  Musculoskeletal: Positive for back pain, gait problem, neck pain and neck stiffness  Allergic/Immunologic: Positive for immunocompromised state  Neurological: Positive for weakness  Negative for facial asymmetry  Psychiatric/Behavioral: Positive for dysphoric mood and sleep disturbance (d/t pain)           OBJECTIVE:  /70 (BP Location: Left arm, Patient Position: Sitting, Cuff Size: Standard)   Pulse 101   Temp (!) 97 °F (36 1 °C) (Temporal)   Resp 18   Ht 5' 8" (1 727 m)   Wt 68 3 kg (150 lb 8 oz)   SpO2 98%   BMI 22 88 kg/m²   Vital signs reviewed  Physical Exam:  Constitutional: Debilitated, chronically ill-appearing elderly female seen in transport chair  In no acute emotional distress  Head: Normocephalic and atraumatic  Eyes: EOM are normal  No ocular discharge  No scleral icterus  Neck: Wearing soft neck brace  Limited ROM d/t muscle spasm  In discomfort when moving neck  Respiratory: Effort normal  No stridor  No respiratory distress on room air  Speaking comfortably in complete sentences  Gastrointestinal: No abdominal distension  Musculoskeletal: No edema  Neurological: Alert, oriented and appropriately conversant  No focal deficits  Follows commands appropriately  Skin: No diaphoresis, no rashes seen on exposed areas of skin  Psychiatric: Displays a normal mood and affect  Behavior, judgment and thought content appear normal      Humberto Wang MD  [facility] Palliative and Supportive Care      Portions of this document may have been created using dictation software and as such some "sound alike" terms may have been generated by the system  Do not hesitate to contact me with any questions or clarifications

## 2021-07-01 NOTE — PATIENT INSTRUCTIONS
It was a pleasure to meet you today  Thank you for coming in  · Good luck at your upcoming visit with Dr Ga Amin! · It's okay to use the methocarbamol, 500mg up to 3 times per day as needed for muscle spasms (including neck pain)  · Try a heating pad or ice pack (you can alternate these about 30 minutes apart) for neck and back pain  · Take Tylenol, 3000mg three times per day, for pain  · I strongly recommend you follow with a spinal specialist (such a Dr Han Burr) or Physiatrist (such as Dr Adonis Khan)  Referring you back to [facility] as your pain is unrelated to your cancer  · Please let me know if you would like to try a corticosteroid (such as dexamethasone 2mg each morning) as this can help improve fatigue, loss of appetite, and in some cases pain  I think that you could benefit from medical marijuana (MMJ)  To register, go to the website listed in the provided brochure (www [facility] [place] go[place])  You will need a computer (a Emida computer is fine, such as in a [facility]), and your own personal email address (free email like Cheo Palacio should be acceptable)  You will apply at that website; there may be a fee  Once you have the registration number, call our office at [phone] to set up an [employer] certification appointment  I recommend that Hailey Harvinder or another trusted family member or friend register as an [employer] caregiver so she can go into the dispensary for you  · A copy of Five Wishes was provided; please review and discuss w/ your family  We can discuss it at our next visit  When complete, you may bring it in to any [facility]'s appointment where staff can witness your signature and scan it in to the system  · If something changes and you need to come in sooner, please call our office  Please protect yourself from the novel Coronavirus (COVID-19)! The numbers of cases of Coronavirus are spiking in [address]   This is not a more dangerous virus, but a sign that more people in a community are spreading the virus  Please check the local disease reports near you if you consider travelling this summer  We do not advise travel to any community or State with a rising viral caseload   Wash your hands! Soap and water, or hand  with at least 60% alcohol, are both effective at killing the virus   Wear a mask! This will help protect others from any virus particles you might spread  Your mouth and nose BOTH need to be covered   Keep the distance! Keep 6 feet of distance from others people, even if they seem healthy  Keeping distance protects you from the other person's virus spread   Thank you for completing the [address] vaccination series

## 2021-07-02 NOTE — TELEPHONE ENCOUNTER
This nurse reached out to patient's home phone to try to complete the previsit screening process for MMJ  I was unable to get through on his phone due to a constant busy signal  I left a vm on his cell phone number to please call the office back regarding the previsit screening for MMJ

## 2021-07-02 NOTE — TELEPHONE ENCOUNTER
Patient called in to go over covid screening     1  Are you currently experiencing any symptoms of fever, cough, shortness of breath, chills, repeated shaking with chills, muscle pain, headache, sore throat, or new loss of taste/smell? No    2  Have you been tested for COVID-19? No      3  Have you been in contact with someone that is confirmed COVID-19 within the past 14 days?      No

## 2021-07-02 NOTE — TELEPHONE ENCOUNTER
----- Message from Mamta Gamez DO sent at 7/2/2021  7:56 AM EDT -----  Please schedule f/u with KIRTI

## 2021-07-02 NOTE — TELEPHONE ENCOUNTER
Please call patients home phone to do assessment for MMJ appt is already schedule thanks  Deysi Clay

## 2021-07-06 PROBLEM — C67.8 MALIGNANT NEOPLASM OF OVERLAPPING SITES OF BLADDER (HCC): Status: ACTIVE | Noted: 2021-01-01

## 2021-07-06 NOTE — TELEPHONE ENCOUNTER
Dr Ruth Helton ordered IR lung biopsy and that was scheduled for 7/26/21  Rad Onc consult will be rescheduled after biopsy results are complete  Fareed Carpio (wife) aware  Jocelyn Paulson will start pembrolizumab 7/16/21  Fareed Carpio aware that Urology follow up will be scheduled after repeat imaging

## 2021-07-06 NOTE — TELEPHONE ENCOUNTER
Patient managed by Dr Bowen Velasquez [facility] from radiology called in to report CT significant findings   [facility] can be reached at [phone]

## 2021-07-06 NOTE — PROGRESS NOTES
Oncology Consult Note  Chevy Macias 82 y o  male MRN: [id number]  Unit/Bed#:  Encounter: [id number]      Presenting Complaint: poorly differentiated bladder cancer    History of Presenting Illness:   47-year-old  male with past medical history of coronary artery disease, myocardial infarction, hypertension, hyperlipidemia, lumbar radiculitis, cervical radiculitis, carotid stenosis, abdominal aortic aneurysm who presented with gross hematuria in May 2021 CT scan of the abdomen and pelvis showed bladder thickening with mass in the dome of the bladder status post cystoscopy with abnormal thickening and appearance no evidence of pelvic or distant lymphadenopathy or masses    Biopsy on 06/07/2021 showed malignant neoplasm with tumor necrosis 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 confirmed by 2nd opinion at [facility]    Cystoscopy showed large irregular bladder mass measuring more than 7 cm in the posterior wall towards the left side relatively avascular was not completely resected there are additional synchronous lesions on the right side    CT scan of the chest on 06/30/2021 was not officially read however it reviewed on the PACS system showed a mass in the left upper lung adjacent to the heart border /chest wall area spiculated highly suspicious for primary cancer or metastatic disease    He met with palliative care and consideration for medical marijuana     He reported fatigue, weight loss, constipation, hematuria, tingling and numbness denies any headache blurred vision diplopia angina melena, hematochezia, skin rash, night sweats    Heavy smoker used to smoke 1 pack of cigarettes daily for many years he does not drink alcohol  Review of Systems - As stated in the HPI otherwise the fourteen point review of systems was negative      Past Medical History:   Diagnosis Date    AAA (abdominal aortic aneurysm) (HCC)     Acute cystitis without hematuria     Atherosclerotic heart disease     Cancer ([facility] [address] )     bladder    Carotid stenosis     Dizziness     Erectile dysfunction     Flaccid neuropathic bladder, not elsewhere classified     Cordova catheter in place     Hypercholesteremia     Hypertension     Hyponatremia     Mobility impaired     uses walker    Muscle spasms of neck     Myocardial infarction ([facility])     SIADH (syndrome of inappropriate ADH production) ([facility])     history of as per Care Everywhere    Urge incontinence of urine     Urinary retention     UTI (urinary tract infection)        Social History     Socioeconomic History    Marital status: /Civil Union     Spouse name: None    Number of children: None    Years of education: None    Highest education level: None   Occupational History    Occupation:      Comment: Retired   Tobacco Use    Smoking status: Never Smoker    Smokeless tobacco: Never Used   Vaping Use    Vaping Use: Never used   Substance and Sexual Activity    Alcohol use: No    Drug use: No    Sexual activity: None   Other Topics Concern    None   Social History Narrative    Patient lives w/ his wife Kelli Tracy  Social Determinants of Health     Financial Resource Strain:     Difficulty of Paying Living Expenses:    Food Insecurity:     Worried About Running Out of Food in the Last Year:     [address] in the Last Year:    Transportation Needs:     Lack of Transportation (Medical):      Lack of Transportation (Non-Medical):    Physical Activity:     Days of Exercise per Week:     Minutes of Exercise per Session:    Stress:     Feeling of Stress :    Social Connections:     Frequency of Communication with Friends and Family:     Frequency of Social Gatherings with Friends and Family:     Attends Mosque Services:     Active Member of Clubs or Organizations:     Attends Club or Organization Meetings:     Marital Status:    Intimate Partner Violence:     Fear of Current or Ex-Partner:     Emotionally Abused:     Physically Abused:     Sexually Abused:        Family History   Problem Relation Age of Onset    Cancer Mother     Heart disease Father        Allergies   Allergen Reactions    Magnesium Chloride Diarrhea    Oxycodone-Acetaminophen Diarrhea         Current Outpatient Medications:     acetaminophen (TYLENOL) 500 mg tablet, Take 2 tablets (1,000 mg total) by mouth 3 (three) times a day, Disp: 180 tablet, Rfl: 2    aspirin 325 mg tablet, Take 325 mg by mouth daily, Disp: , Rfl:     losartan (COZAAR) 100 MG tablet, daily, Disp: , Rfl:     Magnesium Oxide 400 MG CAPS, daily, Disp: , Rfl:     methocarbamol (ROBAXIN) 500 mg tablet, Take 500 mg by mouth 3 (three) times a day as needed for pain (neck pain, muscle spasms), Disp: , Rfl:     metoprolol tartrate (LOPRESSOR) 50 mg tablet, 2 (two) times a day, Disp: , Rfl:     mirtazapine (REMERON) 15 mg tablet, Take 15 mg by mouth daily at bedtime, Disp: , Rfl:     niacin 500 mg tablet, daily, Disp: , Rfl:     nystatin (MYCOSTATIN) powder, Apply topically 2 (two) times a day, Disp: 15 g, Rfl: 0    Omega-3 Fatty Acids (FISH OIL PO), daily, Disp: , Rfl:     pravastatin (PRAVACHOL) 80 mg tablet, daily, Disp: , Rfl:     nitrofurantoin (MACROBID) 100 mg capsule, Take 1 capsule (100 mg total) by mouth 2 (two) times a day (Patient not taking: Reported on 7/6/2021), Disp: 10 capsule, Rfl: 0      /72 (BP Location: Left arm, Patient Position: Sitting, Cuff Size: Adult)   Pulse 103   Temp 98 4 °F (36 9 °C) (Tympanic)   Resp 18   Ht 5' 8" (1 727 m)   Wt 67 1 kg (148 lb)   SpO2 98%   BMI 22 50 kg/m²       General Appearance:    Alert, oriented, on wheelchair        Eyes:    PERRL   Ears:    Normal external ear canals, both ears   Nose:   Nares normal, septum midline   Throat:   Mucosa moist  Pharynx without injection      Neck:   Supple       Lungs:      Distant breath sounds bilaterally   Chest Wall:    No tenderness or deformity    Heart:    Regular rate and rhythm       Abdomen:     Soft, non-tender, bowel sounds +, no organomegaly           Extremities:   Extremities no cyanosis or edema       Skin:   no rash or icterus  Lymph nodes:   Cervical, supraclavicular, and axillary nodes normal   Neurologic:   CNII-XII intact, normal strength, sensation and reflexes     Throughout               No results found for this or any previous visit (from the past 48 hour(s))  No results found    ECOG :2      Assessment and plan:     81-year-old  male with history of cervical and lumbar radiculitis, coronary artery disease, triple AAA, peripheral vascular disease, COPD, hypertension, dyslipidemia, presented with hematuria, was found to have large gross mass measuring 7 cm on the posterior wall of the bladder with extension laterally bilaterally more towards the left status post biopsy showed sarcomatoid urothelial carcinoma with invasion, CT scan at 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

## 2021-07-06 NOTE — PROGRESS NOTES
Spoke to Dr Malik Jackson after Harlan's consult with him today  Dr Malik Jackson ordered IR lung biopsy and it was scheduled on 7/26/21  Dr Malik Jackson requested Rad Onc consult be rescheduled after results of IR biopsy are final  Called Shruthi Hennessy (wife)  Made her aware that Rad Onc consult will be rescheduled about 2 weeks after lung biopsy and that Rad Onc will call her to reschedule

## 2021-07-06 NOTE — TELEPHONE ENCOUNTER
Call placed to patient, spoke with wife per communication consent form  She states that Dr Ruth Helton has discussed plan of care with patient and his wife  They will follow up as recommended by med onc today  She declined follow up with Dr Frankey Gulling at this time  She states she will call office if needed

## 2021-07-06 NOTE — TELEPHONE ENCOUNTER
Patient seen by medical oncology today, and CT scan and recommendations discussed at that time  He should follow up with Dr Melo Ngo to discuss definitive plan of care

## 2021-07-09 NOTE — TELEPHONE ENCOUNTER
Medical Marijuana Pre-Visit Screening for Palliative & Supportive Care    Referral Source: Dr Whitney Parra  Diagnosis:Bladder cancer  Is the diagnosis an approved serious medical condition as outlined by [place]:yes  History/Symptoms: fatigue, loss of appetite and pain    Does the patient's diagnosis fall within the current scope of our Palliative & Supportive Care practice: yes    Does the patient intend to use MMJ with palliative intent:     Does the patient currently have a signed controlled substances contract with another provider:   Is the patient a resident of [place] with a valid state ID or 's license:   Has the patient registered on the [address] [place] [address]  website: Yes  [email or web address]     Prior to any scheduled visit, the patient has been informed of the following:  · [facility] providers are knowledgeable about many ways to help people  A visit to discuss MMJ does not mean that the provider agrees that this is the best way to help you and may make other recommendations  · There is an expectation and requirement by the [place] MMJ law for continuity of care if your certification is completed  · You will be expected to sign an informed consent  · A PDMP report will be reviewed before your visit  · This may effect your ability to purchase a handgun  · Medical marijuana products are not covered by insurance  The dispensaries do not accept credit cards  · The certification does not exempt you from any employer based drug screening programs and may effect your ability to participate in federally funded programs  · [facility] does not allow for medical marijuana possession or use at any of it's inpatient facilities  If it is brought it you will be asked to send it home with a designated representative (friend or family member)  If not one is available to take the product(s) home they will be stored in a secure location until you are discharged    · Please spend time becoming familiar with the information on the website before your visit: [email or web address]    Date of scheduled visit:  7/12/21 with Dr Casiano Ora  Wife is not a registered Caregiver and I did explain the process to become a registered caregiver, she was a little unsure of how it would all work out

## 2021-07-12 NOTE — PROGRESS NOTES
VV attempt made x2 unsuccessfully  LSW phoned pt's wife Angeles Holbrook at [phone]  Angeles Holbrook reports that her main concern at this time is that pt has registered for [employer] and needs certification from physician  Wife states appt scheduled 2 weeks out for pt is too long for him to go without being seen  Wife concerned pt's pain is not being controlled and he is unable to eat  Wife advised during prior conversation with Dr Toy Davis, a steroid was mentioned for pt  Wife would be interested in getting steroid for pt in the interim  During conversation wife had to take another call and advised she would c/b     LSW informed by Carito Richey that wife called office back, and she discussed MMJ appointment with wife--no sooner availability for pt  Hamzah Vanessa has scheduled pt for appt tomorrow 7/13 with Dr Krishna Bond to address pt's symptoms  LSW to follow

## 2021-07-12 NOTE — TELEPHONE ENCOUNTER
Reviewed with Dr Fuad Alves and he would like palliative care to manage any steroids related to appetite  Called the patient's wife back and encouraged her to keep this appt tomorrow  She verbalized understanding

## 2021-07-12 NOTE — TELEPHONE ENCOUNTER
This nurse called patient back and was able to schedule with Sarah Beth Fay one week earlier and educated on her process for becoming a registered caregiver

## 2021-07-12 NOTE — TELEPHONE ENCOUNTER
Call from patient's wife, Katty Fraser  Patient does not have to fast for labs  Patient went to palliative care   Patient is not eating  Palliative care recommended medical kamaljit but appt has been delayed for 2 weeks   Patient's wife would like Dr Alla Dunlap to order low dose decadron as recommended by palliative care so patient nabeel have to keep palliative care appt tomorrow    Please call back to discuss

## 2021-07-12 NOTE — TELEPHONE ENCOUNTER
I spoke with Angeles Holbrook wife and rescheduled Dr Brando Smith but wife does have some questions about the [facility] Caregiver register site  I told Angeles Holbrook our [facility] nurse would give her a call back

## 2021-07-13 NOTE — PROGRESS NOTES
Outpatient Follow-Up - Palliative and Supportive Care   Aimee Ha 82 y o  male [id number]    Assessment & Plan  Problem List Items Addressed This Visit     None      Visit Diagnoses     Therapeutic opioid-induced constipation (OIC)    -  Primary    Relevant Medications    polyethylene glycol (MIRALAX) 17 g packet    senna (SENOKOT) 8 6 MG tablet    Malignant cachexia (HCC)        Relevant Medications    predniSONE 10 mg tablet    Severe protein-calorie malnutrition (HCC)        Relevant Medications    predniSONE 10 mg tablet        - Counseling on health screening and disease prevention, COVID-19 specific (CPT V65 49)    Medications adjusted this encounter:  Requested Prescriptions     Signed Prescriptions Disp Refills    polyethylene glycol (MIRALAX) 17 g packet 30 each 11     Sig: Take 17 g by mouth daily To prevent constipation  Hold one dose for loose stool   senna (SENOKOT) 8 6 MG tablet 60 tablet 11     Sig: Take 1 tablet (8 6 mg total) by mouth 2 (two) times a day as needed for constipation    predniSONE 10 mg tablet 30 tablet 0     Sig: Take 1 tablet (10 mg total) by mouth daily with breakfast     No orders of the defined types were placed in this encounter  Medications Discontinued During This Encounter   Medication Reason    nitrofurantoin (MACROBID) 100 mg capsule     mirtazapine (REMERON) 15 mg tablet     predniSONE 10 mg tablet Reorder   - Trial of steroids to stimulate appetite  - Referred for MMJ  Aimee Ha was seen today for symptoms and planning cares related to above illnesses  I have reviewed the patient's controlled substance dispensing history in the Prescription Drug Monitoring Program in compliance with the [facility] regulations before prescribing any controlled substances  They are invited to continue to follow with us  If there are questions or concerns, please contact us through our clinic/answering service 24 hours a day, seven days a week      Nilsa Jane MD  [facility]  [phone]      Visit Information    Accompanied By: Family member    Source of History: Self, Family member    History Limitations: None    Contacts: wife Ritesh Dykes - [phone], [phone]    History of Present Illness      Harpreet Rowell is a 82 y o  male who presents in follow up of symptoms related to bladder cancer and spinal stenosis  Pertinent issues include: symptom management, support      recently saw Dr Kandace Schwab:    · "Patient c/o acute neck pain (though this is chronically recurring, x years) and chronic spinal pain which predates his malignancy by years and is unrelated to his palliative diagnosis  ? Local injections have resolved his neck pain immediately (done in the ED on several occasions)  ? Recommended conservative management (Tylenol, local topical solutions, ice/heat)  ? Recommended he return to Spine & Pain and/or Physiatry for management of non-malignant spinal pain  Referred to [facility]  ? He was prescribed methocarbamol by his PCP for this pain (muscle spasm elements); he may continue this  · Patient has yet to meet Medical Oncology or Radiation Oncology but is interested in pursing disease-directed treatments for his bladder cancer  · Recommended trial of PO corticosteroid (anorexia, fatigue, pain) but patient and his wife wish to discuss this w/ their PCP first   · Continue mirtazapine for now  · Patient could benefit from [employer]  Counseling and literature provided  They will call [facility] if interested in moving forward, once they have registered  Will need another separate visit as the logistics, legality, adverse effects, etc would need to be discussed in greater detail  · Patient and wife wish to wait until they have spoken w/ Oncology and/or registered for [employer] before setting up a f/u visit  · Patient has not completed any advanced healthcare directives  He accepts a copy of 5 Wishes along with counseling  ACPs may be reviewed in detail at next visit  · Once patient established w/ Oncology, he can be referred to Oncology Nutrition  Literature on nutrition provided  · Emotional support provided  · Medication safety issues addressed - no driving under the influence of narcotics, watch for adverse effects including AMS and respiratory depression, keep medications stored in a safe/locked environment  · Patient has completed the [address] vaccination series  Reviewed notes (Urology, Neurology, Neurosurgery / Spine & Pain, Pain Medicine, [facility] IM), labs (6/29/21: Cr 1 14, alb 2 8, Hb 11 2; A1c 6 0 on 11/24/21), imaging (6/30/21 CT chest read pending, 2/11/21 MRI thoracic spine)  "      "Leland Molina is a 82 y o  male w/ muscle-invasive bladder cancer s/p TURBT, chronic non-malignant pain s/t cervical spinal stenosis w/ neurogenic claudication + lumbar radiculitis + lumbar post-laminectomy syndrome + L4-5 herniated nucleus pulposus  He follows w/ Dr Estela Gerber (Urology), [facility] (most recently [facility] Dr Yaa Dominguez on 2/4/21), Dr Silvana Meehan (Physiatry)  He has not yet seen Medical Oncology or Radiation Oncology but has visits scheduled w/ Dr Poncho House and Dr Jordan Leary  Malignancy diagnosis confirmed by [facility]  Per Urology, patient is not a recommended surgical candidate (but their 6/22/21 note states it is an option for him)  Patient was offered spinal stimulator by Pain Medicine in 02/2021 as TESI had not been of benefit      Patient is new to [facility] clinic; referred by Urology for symptom management      Patients primary complaint is acute neck pain - this is not a new issue, rather a recurring acute issue that he states has resolved quickly in the past when Emergency Department physicians provided a local injection (chart review shows IM injections of 15mg ketorolac on 8/28/20 and 8/8/19 at [facility] ED)  He also c/o chronic lumbar pain   These pains predate his malignancy by years, and there is no evidence of osseous metastasis on imaging  Patient was prescribed methocarbamol by his PCP but he has only tried that once today (prior to visit)  This had been par of his medication regimen in the past per chart review  He has not tried heat or ice, or topical medications  His back pain can be worse when ambulating      Patient endorses significant fatigue, and weakness of b/l LE  His appetite is low and he has lost 10 2% WTB in 6 months  He tries to supplement PO intake w/ Ensure  Discussed MMJ today (for appetite stimulation and relief of chronic pain); he expressed interest      Patient lives w/ his wife Barney Dorsey  He has not yet completed any advanced directives  He has home health services (VNA, PT, etc) through [facility] "      Today we note that he has a poor appetite and poor intake, and that he has lost weight and energy since he has been diagnosed with his cancer  He is agreeable to consider MMJ as well, as he would wish for a medication with some of the opposite side effects from steroids: reduced anxiety and reduced nausea and less irritation to gastric mucosa  Past medical, surgical, social, and family histories are reviewed and pertinent updates are made  Review of Systems   Constitutional: Positive for decreased appetite and weight loss  Negative for weight gain  HENT: Negative for hoarse voice and nosebleeds  Eyes: Negative for vision loss in left eye and vision loss in right eye  Cardiovascular: Negative for chest pain and dyspnea on exertion  Respiratory: Negative for cough and shortness of breath  Endocrine: Negative for polydipsia, polyphagia and polyuria  Skin: Negative for flushing and itching  Musculoskeletal: Positive for back pain, joint swelling, neck pain and stiffness  Negative for falls  Gastrointestinal: Positive for nausea and vomiting  Negative for anorexia and jaundice  Genitourinary: Negative for frequency     Neurological: Negative for dizziness  Psychiatric/Behavioral: Negative for depression and memory loss  The patient is not nervous/anxious  Vital Signs    /62 (BP Location: Left arm, Cuff Size: Standard)   Pulse 97   Temp (!) 97 4 °F (36 3 °C) (Temporal)   Resp 16   Wt 66 5 kg (146 lb 9 7 oz)   SpO2 98%   BMI 22 29 kg/m²     Physical Exam and Objective Data  Physical Exam  Constitutional:       General: He is not in acute distress  Appearance: He is ill-appearing  He is not toxic-appearing or diaphoretic  Comments: Frail   HENT:      Head: Normocephalic and atraumatic  Right Ear: External ear normal       Left Ear: External ear normal    Eyes:      General:         Right eye: No discharge  Left eye: No discharge  Conjunctiva/sclera: Conjunctivae normal       Pupils: Pupils are equal, round, and reactive to light  Neck:      Trachea: No tracheal deviation  Cardiovascular:      Rate and Rhythm: Normal rate and regular rhythm  Pulmonary:      Effort: Pulmonary effort is normal  No respiratory distress  Breath sounds: No stridor  Abdominal:      Palpations: Abdomen is soft  Comments: scaphoid   Skin:     General: Skin is warm and dry  Coloration: Skin is pale  Findings: No erythema or rash  Neurological:      General: No focal deficit present  Mental Status: He is alert and oriented to person, place, and time  Mental status is at baseline  Cranial Nerves: No cranial nerve deficit  Psychiatric:         Mood and Affect: Mood normal          Behavior: Behavior normal          Thought Content:  Thought content normal          Judgment: Judgment normal            Radiology and Laboratory:  I personally reviewed and interpreted the following results: none new    30+ minutes was spent face to face with Albino Cordova and his family with greater than 50% of the time spent in counseling or coordination of care including discussions of etiology of diagnosis, risks and benefits of treatment, patient and family counseling/involvement in care and compliance with treatment regimen   Additional time was also spent in discussing coronavirus vaccine indications, availability, and logistical challenges  All of the patient's or agent's questions were answered during this discussion

## 2021-07-13 NOTE — TELEPHONE ENCOUNTER
Call placed to the patient per [facility] referral     Spoke with patients wife Merrill Bashir regarding the referral  She states her  has seen Dr Gini Carreno in the past for injections and will be contacting them to follow up with his chronic pain  States he is not interested in PT  Patients wife appreciative for the call and was provided with our business hrs and ph# should they need any assistance in the future  Referral Closed

## 2021-07-14 NOTE — PROGRESS NOTES
MSW received a referral from Med Onc, where the pt self scored an 8 to indicate his level of stress  The pt marked off depression, fears, nervousness, sadness and worry as his psychosocial stressors  He indicated 7 out of 21 physical stressors  MSW made outreach this day and spoke with the pt's wife, who was open and receptive of this call  Pt's wife shared that she and her  completed the form together  MSW asked pt's spouse if the pt was recognizing that he was depressed, to which she felt he did  Pt's wife shared that pt is being seen by Palliative and this is being addressed  MSW asked if the pt would consider counseling and pt's wife felt they had too many appointments as well as it is challenging for the pt to get in and out of the car  MSW offered phone support and pt's wife was agreeable to this and felt the pt would also  Pt was sleeping and she took MSW number, feeling it would be better for them to call when the pt was desiring to talk  Pt is also to start chemo and MSW offered chairside counseling, to which pt's wife thought the pt would appreciate  MSW will plan to meet pt on 8/6  Emotional support offered to wife and MSW will continue to follow

## 2021-07-16 NOTE — TELEPHONE ENCOUNTER
Spoke to Patient and wife  Patient reports that he has been having pain/discomfort and "has not been peeing well" since before he got Keytruda this morning - feeling have worsened and Patient states he feels he is retaining urine  Patient scheduled to come into office for PVR and possible urine testing  Patient and wife agree to plan and are on their way

## 2021-07-16 NOTE — TELEPHONE ENCOUNTER
Received notification by Nita Roth RN on 7/16/21 that pt has triggered for oncology nutrition care (reason for referral: wt loss, difficulty eating )  Luis Mejia today to establish care  Called preferred number ([phone]) line was busy  Called mobile number ([phone]) no answer, left voice message with the reason for today's call and this RDs contact information asking that Arturo Sullivan call back as able/desired

## 2021-07-16 NOTE — TELEPHONE ENCOUNTER
Returned call to patient's wife Merrill Bashir  Left message on her voicemail to call Urology to discuss urinary symptoms  Will forward task to [facility] Dr Gordo Burt office to follow up with patient  Will notify Dr Tino Fernandez RN as Karson Haque only that message was left for patient to follow up with urology

## 2021-07-16 NOTE — TELEPHONE ENCOUNTER
Patient of Dr Mark Agrawal seen in [facility] with history of muscle invasive bladder cancer  He had TURBT on 6/07/2021  LMOM to call office and office number given

## 2021-07-16 NOTE — PROGRESS NOTES
7/16/2021    Misty Cote  1935  [id number]    Diagnosis  Chief Complaint     Urinary Retention; Difficulty Urinating; Urinary Urgency          Patient presents for PVR managed by Dr Reeves Covert  · Urine testing sent  Patient to be called with results  · Patient to call office/go to ER with difficulty/inability to urinate  · Patient to call office with questions or concerns in the interim  Procedure PVR    Patient arrived to office with wife in wheelchair - A&O x 4 and transfers with minimal assist  Patient declined vitals - taken earlier today  Patient states able to void but with urgency and some difficulty and burning with urination  Denies fever, chills, or any other complaints at this time  Patient voided prior to coming to office  Bladder ultrasound performed and PVR measured 127 ml  Orders placed for UA C&S to rule out urinary tract infection  Office will follow up as results become available usually within 48-72 hours  Patient encouraged to hydrate well with water and avoid bladder irritants  Patient instructed to call back with worsening symptoms, fever, chills, blood in the urine or difficulty urinating  Patient and wife repeat back understanding and agree with plan  He knows he may return early if uncomfortable and unable to urinate         Recent Results (from the past 4 hour(s))   POCT Measure PVR    Collection Time: 07/16/21  4:13 PM   Result Value Ref Range    POST-VOID RESIDUAL VOLUME, ML  mL         Mayito Thayer, RN, BSN

## 2021-07-16 NOTE — TELEPHONE ENCOUNTER
Patient's wife Barney Dorsey calling to request a call back about patient's problem urinating and having pain  Please reach back to Barney Dorsey to discuss at [facility]: [address]

## 2021-07-19 PROBLEM — Z79.899 MEDICAL MARIJUANA USE: Status: ACTIVE | Noted: 2021-01-01

## 2021-07-19 PROBLEM — Z51.5 PALLIATIVE CARE PATIENT: Status: ACTIVE | Noted: 2021-01-01

## 2021-07-19 NOTE — TELEPHONE ENCOUNTER
Called Rinku Malhotra back and went over the plan  After Lona Onofre finishes the kelfex for UTI  He can start a daily dose of keflex 250 mg  He  Is also to hydrate with water  She understands

## 2021-07-19 NOTE — TELEPHONE ENCOUNTER
Called and spoke with Albertha Lennox that ABX was sent to pharmacy  She states he keeps gettng UTI's and he is not steady on his feet and he is getting up at night  Patient has high  grade bladder cancer invasive into the bladder- confirmed by [facility]  He was referred to Medical oncology  And was told that he was not a good candidate for surgery   She is just wondering if maybe a daily antibiotic would help or is there any other suggestion

## 2021-07-19 NOTE — TELEPHONE ENCOUNTER
Results of recent urine culture positive for infection  Prescription for Keflex was electronically sent to his pharmacy

## 2021-07-19 NOTE — PROGRESS NOTES
Palliative and Supportive Care   Carlton Phillips 82 y o  male [id number]    Assessment/Plan:  1  Malignant neoplasm of overlapping sites of bladder ([address] )    2  Chronic pain syndrome    3  Chronic bilateral low back pain without sciatica    4  Cervical spinal stenosis    5  Loss of appetite    6  Neoplastic malignant related fatigue    7  Medical marijuana use    8  Palliative care patient        Requested Prescriptions      No prescriptions requested or ordered in this encounter     There are no discontinued medications  Representatives have queried the patient's controlled substance dispensing history in the Prescription Drug Monitoring Program in compliance with regulations before I have prescribed any controlled substances  The prescription history is consistent with prescribed therapy and our practice policies  Last fill:  3/31/2021: pregabalin 75mg, #120    The patient qualifies for use of MMJ in the [facility] by having the following medical condition - bladder cancer  From a palliative care stand point the patient is suffering with decreased appetite, weight loss, and chronic pain  These symptoms and side effects might be alleviated with use of MMJ products  The patient registered online  The patient read and I reviewed the informed consent document with the patient  I answered all questions related to it before the patient signed it  The patient's medical certification was completed on this date  The patient was given a signed copy of the informed consent and medical certification  I issued a certification for MMJ use with palliative intent -  To help alleviate cancer related symptoms and cancer treatment related side effects  I do not endorse the belief that MMJ can treat cancer and strongly encouraged the patient to continue treatments and surveillance as recommend by cancer specialists        The visit was spent face to face with Carlton Phillips and his wife with greater than 50% of the time spent in counseling or coordination of care including discussions of treatment instructions and follow up requirements   All of the patient's questions were answered during this discussion  Return in about 1 month (around 8/19/2021), or if symptoms worsen or fail to improve, for w/ Dr Pily Palma  Subjective: In attendance at this visit: Estrella Dunbar and his wife   Chief Complaint  Follow up visit for:  Medical marijuana certification  HPI     Estrella Dunbar is a 82 y o  male with muscle-invasive bladder cancer s/p TURBT  PMH significant for chronic pain syndrome due to cervical spinal stenosis w/ neurogenic claudication and lumbar radiculitis w/ post-laminectomy syndrome and L4-5 herniated nucleus pulposus  CT chest 6/2021 showed 2 3 x 2 0 cm lobulated anteromedial left upper lobe nodule that could be primary lung cancer or metastatic disease  Patient follows Urology/Dr Richardson Else  He also follows [facility] Pain Medicine/Dr Disha Dowd, seen on 2/4/21 and Dr Alonso Mccann  Patient has appointment scheduled w/ HemOnc/Dr Fannie Pope on 8/5/21 and RadOnc/Dr Gloria Fong on 8/19  He was seen by palliative partner/Dr Pily Palma on 7/1/21, with f/u scheduled for 8/19/21  Patient for f/u today for medical marijuana certification  He is hoping it helps with his appetite, weight loss, and chronic pain  He c/o neck and back pain not related to his malignancy  This is a recurring issue that resolves with local injection  Patient prescribed methocarbamol by PCP, but has not tried heat/ice or topical medications  His pain worsens with ambulation  Patient has home health services(VNA, PT) through [facility]  Patient states that his ambulation feels unsteady at times and says that [address] [place] [address] was going to request an OT/PT eval  I encouraged wife to call [facility]/PCP to make sure that there is a PT/OT referral, and discussed importance of therapy for strength and functioning   *Palliative RN called [facility], patient has home OT eval tomorrow, asked them to eval for safety/steadiness/PT eval     Patient also complains of significant fatigue and weakness of b/l LE  He reports decreased appetite and weight loss  He tries to take Ensure supplements to increase his calories and nutrition  Patient started on prednisone 10mg daily by palliative partner/Dr Ginna Becerra on 7/13/21  He did note some improvement, but states recently diagnosed UTI wiped out the improvements since he is up peeing all night and not getting restful sleep  They have not filled antibiotic prescription yet, and wife expressed concerns using same antibiotic that patient has been on in past  I encouraged wife to call Urology team/PCP to discuss concerns with antibiotic prescribed  Patient no longer taking mirtazapine as he states it was not helpful  We discussed the different forms of mmj products  We discussed trying tincture and topicals  We discussed starting low and going slow on initiation for symptom relief and adjusting as tolerated  We discussed taking in the presence of family/friend initially and monitoring response  Stressed the importance of this with patient's weakness and feeling unsteady at times  We discussed safety and caution with other medications  All questions answered and concerns addressed  Patient wishes to proceed with certification  The following portions of the medical history were reviewed: past medical history, family history, problem list, medication list, and social history  Lives with wife/Germania Hicks Wishes document provided, encouraged to complete        Current Outpatient Medications:     acetaminophen (TYLENOL) 500 mg tablet, Take 2 tablets (1,000 mg total) by mouth 3 (three) times a day, Disp: 180 tablet, Rfl: 2    aspirin 325 mg tablet, Take 325 mg by mouth daily, Disp: , Rfl:     cephalexin (KEFLEX) 500 mg capsule, Take 1 capsule (500 mg total) by mouth every 12 (twelve) hours for 5 days, Disp: 10 capsule, Rfl: 0    losartan (COZAAR) 100 MG tablet, daily, Disp: , Rfl:     Magnesium Oxide 400 MG CAPS, daily, Disp: , Rfl:     methocarbamol (ROBAXIN) 500 mg tablet, Take 500 mg by mouth 3 (three) times a day as needed for pain (neck pain, muscle spasms), Disp: , Rfl:     metoprolol tartrate (LOPRESSOR) 50 mg tablet, 2 (two) times a day, Disp: , Rfl:     niacin 500 mg tablet, daily, Disp: , Rfl:     nystatin (MYCOSTATIN) powder, Apply topically 2 (two) times a day, Disp: 15 g, Rfl: 0    Omega-3 Fatty Acids (FISH OIL PO), daily, Disp: , Rfl:     polyethylene glycol (MIRALAX) 17 g packet, Take 17 g by mouth daily To prevent constipation  Hold one dose for loose stool , Disp: 30 each, Rfl: 11    pravastatin (PRAVACHOL) 80 mg tablet, daily, Disp: , Rfl:     predniSONE 10 mg tablet, Take 1 tablet (10 mg total) by mouth daily with breakfast, Disp: 30 tablet, Rfl: 0    senna (SENOKOT) 8 6 MG tablet, Take 1 tablet (8 6 mg total) by mouth 2 (two) times a day as needed for constipation, Disp: 60 tablet, Rfl: 11    Review of Systems   Constitutional: Positive for activity change, appetite change, fatigue and unexpected weight change  Genitourinary: Positive for dysuria and hematuria (patient reports some blood with new UTI)  Musculoskeletal: Positive for arthralgias, back pain, gait problem, myalgias and neck pain  Neurological: Positive for weakness  Psychiatric/Behavioral: Positive for dysphoric mood and sleep disturbance  All other systems negative    Objective:  Vital Signs  /60 (BP Location: Left arm, Patient Position: Sitting, Cuff Size: Standard)   Pulse 72   Temp (!) 97 3 °F (36 3 °C) (Temporal)   Resp 20   Wt 66 2 kg (146 lb)   BMI 22 20 kg/m²    Physical Exam    Constitutional: Appears well-developed and well-nourished  In no acute distress  Head/Mouth: Normocephalic and atraumatic  Patient wearing mask  Reports no lesions or sores  Eyes: EOM are normal  No ocular discharge  No scleral icterus     Neck: No visible adenopathy or masses  Respiratory: Effort normal  No stridor  No respiratory distress  Gastrointestinal: No abdominal distension  Musculoskeletal/Extremities: No edema  No cyanosis  ROM appears normal, no tenderness  Neurological: Alert, oriented and appropriately conversant  Skin: Dry, no diaphoresis  Psychiatric: Displays a normal mood and affect  Behavior, judgement and thought content appear normal     Spent additional time(25+ minutes) reviewing chart, precharting, reviewing mmj consent/safety, completing mmj online certification

## 2021-07-21 NOTE — TELEPHONE ENCOUNTER
Patient wife called in and advised that the new script has not been sent to the pharmacy yet  Patient wife also would like to know if he will need to retake a urine test  She also would like to know his next steps in his plan of care  Please advise

## 2021-07-21 NOTE — PRE-PROCEDURE INSTRUCTIONS
Phone Consult completed:Pre procedure instructions for lung biopsy reviewed with wife with verbal understanding  Allergies,meds,NPO, and ride  Approximate arrival time given,SDS phone call evening before procedure  patient holding ASA LD today  Covid vaccine completed Feb 2021

## 2021-07-21 NOTE — TELEPHONE ENCOUNTER
Second attempt to contact Sage Álvarez to establish oncology nutrition care, spoke to his wife Ana Rosa Reddy  Nutrition education provided  Outpatient Oncology Nutrition Education  Type of Consult:  Nutrition Education  Care Location: Telephone Call; Ana Rosa Reddy    Reason for referral: Joceline Delacruz RN on 7/16/21 (reason for referral: wt loss, difficulty eating )  Oncology Diagnosis & Treatments: Diagnosed with bladder cancer 7/6/21  Started chemotherapy Luh Sarabia) 7/16/21    Oncology History   Malignant neoplasm of overlapping sites of bladder ([facility] )   7/6/2021 Initial Diagnosis    Malignant neoplasm of overlapping sites of bladder ([facility] )     7/16/2021 -  Chemotherapy    pembrolizumab (KEYTRUDA) IVPB, 200 mg, Intravenous, Once, 1 of 6 cycles  Administration: 200 mg (7/16/2021)       Past Medical & Surgical Hx:   Patient Active Problem List   Diagnosis    Erectile dysfunction    Urge incontinence of urine    Benign prostatic hyperplasia with nocturia    Recurrent urinary tract infection    Cervical spinal stenosis    Lumbar radiculitis    Spastic gait    Urgency of urination    Chronic pain syndrome    Chronic bilateral low back pain without sciatica    Herniated nucleus pulposus, L4-5    Spinal stenosis of lumbar region with neurogenic claudication    Lumbar post-laminectomy syndrome    HTN (hypertension)    HLD (hyperlipidemia)    AAA (abdominal aortic aneurysm) without rupture ([facility] )    CAD (coronary artery disease)    AV block, 1st degree    PAD (peripheral artery disease) (HCC)    Mitral valve disorder    Loss of appetite    Neoplastic malignant related fatigue    Bladder [facility]    Malignant neoplasm of overlapping sites of bladder ([facility] )    Medical marijuana use    Palliative care patient     Past Medical History:   Diagnosis Date    AAA (abdominal aortic aneurysm) ([facility] )     Acute cystitis without hematuria     Atherosclerotic heart disease     Cancer ([facility] )     bladder    Carotid stenosis     Dizziness     Erectile dysfunction     Flaccid neuropathic bladder, not elsewhere classified     Cordova catheter in place     Hypercholesteremia     Hypertension     Hyponatremia     Mobility impaired     uses walker    Muscle spasms of neck     Myocardial infarction (HCC)     SIADH (syndrome of inappropriate ADH production) ([facility])     history of as per Care Everywhere    Urge incontinence of urine     Urinary retention     UTI (urinary tract infection)      Past Surgical History:   Procedure Laterality Date    CARDIAC SURGERY      stent x 2    CERVICAL SPINE SURGERY  2010    cervical hardware    CYSTOSCOPY  02/08/2017    CYSTOSCOPY  05/14/2021    LUMBAR SPINE SURGERY  2006    LA CYSTOURETHROSCOPY,FULGUR <0 5 CM LESN N/A 6/7/2021    Procedure: TRANSURETHRAL RESECTION OF BLADDER TUMOR (TURBT);   Surgeon: Daryl Brown MD;  Location: [facility];  Service: Urology       Review of Medications:     Current Outpatient Medications:     acetaminophen (TYLENOL) 500 mg tablet, Take 2 tablets (1,000 mg total) by mouth 3 (three) times a day, Disp: 180 tablet, Rfl: 2    aspirin 325 mg tablet, Take 325 mg by mouth daily, Disp: , Rfl:     cephalexin (KEFLEX) 500 mg capsule, Take 1 capsule (500 mg total) by mouth every 12 (twelve) hours for 5 days, Disp: 10 capsule, Rfl: 0    losartan (COZAAR) 100 MG tablet, daily, Disp: , Rfl:     Magnesium Oxide 400 MG CAPS, daily, Disp: , Rfl:     methocarbamol (ROBAXIN) 500 mg tablet, Take 500 mg by mouth 3 (three) times a day as needed for pain (neck pain, muscle spasms), Disp: , Rfl:     metoprolol tartrate (LOPRESSOR) 50 mg tablet, 2 (two) times a day, Disp: , Rfl:     niacin 500 mg tablet, daily, Disp: , Rfl:     nystatin (MYCOSTATIN) powder, Apply topically 2 (two) times a day, Disp: 15 g, Rfl: 0    Omega-3 Fatty Acids (FISH OIL PO), daily, Disp: , Rfl:     polyethylene glycol (MIRALAX) 17 g packet, Take 17 g by mouth daily To prevent constipation  Hold one dose for loose stool , Disp: 30 each, Rfl: 11    pravastatin (PRAVACHOL) 80 mg tablet, daily, Disp: , Rfl:     predniSONE 10 mg tablet, Take 1 tablet (10 mg total) by mouth daily with breakfast, Disp: 30 tablet, Rfl: 0    senna (SENOKOT) 8 6 MG tablet, Take 1 tablet (8 6 mg total) by mouth 2 (two) times a day as needed for constipation, Disp: 60 tablet, Rfl: 11    Most Recent Lab Results:   Lab Results   Component Value Date    WBC 11 86 (H) 07/13/2021    ALT 24 07/13/2021    AST 29 07/13/2021    K 4 3 07/13/2021    K 4 0 06/29/2021    BUN 27 (H) 07/13/2021    BUN 21 06/29/2021    CREATININE 1 11 07/13/2021    CREATININE 1 14 06/29/2021    HGBA1C 6 0 (H) 11/24/2020    HGBA1C 5 9 (H) 03/09/2019    HGBA1C 5 9 (H) 09/06/2018    CALCIUM 10 1 07/13/2021       Anthropometric Measurements:   Ht Readings from Last 1 Encounters:   07/06/21 5' 8" (1 727 m)     -Weight History:    Wt Readings from Last 15 Encounters:   07/19/21 66 2 kg (146 lb)   07/16/21 66 6 kg (146 lb 13 2 oz)   07/13/21 66 5 kg (146 lb 9 7 oz)   07/06/21 67 1 kg (148 lb)   07/01/21 68 3 kg (150 lb 8 oz)   06/11/21 68 kg (150 lb)   06/07/21 69 4 kg (153 lb)   05/14/21 75 8 kg (167 lb)   02/11/21 73 9 kg (163 lb)   11/24/20 70 8 kg (156 lb)   10/30/20 74 4 kg (164 lb)   09/02/20 74 4 kg (164 lb)   08/26/20 73 9 kg (163 lb)   06/22/20 74 2 kg (163 lb 8 oz)   06/08/20 73 9 kg (163 lb)     Oncology Nutrition-Anthropometrics      Telephone from 7/21/2021 in [address] [place] [address] Dietitian Ethel   Patient age (years):  82 years   Patient (male) height (in):  76 in   Current weight (lbs):  146 lbs   Current weight to be used for anthropometric calculations (kg)  66 4 kg   BMI:  22 2   IBW male  154 lb   IBW (kg) male  70 kg   IBW % (male)  94 8 %   Adjusted BW (male):  152 lbs   Adjusted BW in kg (male):  69 1 kg   % weight change after 1 week:  -1 4 %   Weight change after 1 week (lbs)  -2 lbs   % weight change after 1 month:  -2 7 % Weight change after 1 month (lbs)  -4 lbs   % weight change after 1 year:  -10 4 %   Weight change after 1 year (lbs)  -17 lbs        Oncology Nutrition-Estimated Needs      Telephone from 2021 in [address] [place] [address]   Weight type used  IBW   Weight in kilograms (kg) used for estimated needs  70 kg   Energy needs formula:   30-35 kcal/kg   Energy needs based on 30 kcal/k kcal   Energy needs based on 35 kcal/k kcal   Protein needs formula:  1 2-1 5 g/kg   Protein needs based on 1 2 g/k g   Protein needs based on 1 5 g/kg  105 g   Fluid needs formula:  30-35 mL/kg   Fluid needs based on 30 mL/kg  2100 mL   Fluid needs in ounces  71 oz   Fluid needs based on 35 mL/kg  2450 mL   Fluid needs in ounces  83 oz        Discussion/Summary:  Bello Mast was contacted today for nutrition education regarding wt loss and poor po intake  Spoke to his wife Freddie Bloch who reports that Marvin Aleman has been struggling to eat enough due to decreased appeitte and has experienced weight loss as a result  His usual body weight is ~160#  She does say that he recently started a steroid which has help improve his appetite at this time  Reviewed the importance of weight management throughout the treatment process and the role of a high calorie/ high protein diet in managing weight  Discussed ways to increase calorie and protein intake, suggestions include: eating smaller/more frequent meals, including high calorie foods in diet (cheese, whole milk, peanut butter), including high protein foods in diet (eggs, chicken, fish, beans/legumes, nuts/nut butters), eating when feeling most hungry, sipping on calorie containing beverages, utilizing oral nutrition supplements, and keeping non perishable foods nearby to snack on  Fam Bloch states that Marvin Aleman has been drinking [employer], he has tried Ensure/Boost but prefers that CIB   Discussed using Ensure/Boost is recipes such as milkshakes, pudding, liz  Will send recipes for more ideas  Lupe Kirby also reports that he is not experiencing any nausea, vomiting, diarrhea  However he is experiencing constipation  Reviewed nutrition therapy for constipation, suggestions include:  - Try to eat at about the same times each day   -warm beverages can help stimulate the bowels  -maintain hydration with at least 64oz liquid per day   -try to consume insoluble fiber foods (refer to Fiber for Bowel Management handout and Soluble vs  Insoluble Fiber handout)  -light physical activity can help    Reviewed Eating Hints Book and how to navigate, pt was provided copy at a previous oncology office visit  Lupe Kirby has been reading through to help familiarize with some of the symptoms that Verenice Noonan may experience  Lupe Kirby is appreciative of call and information, she will reach out to this RD with any nutrition related questions/concerns moving forward  Provided RD contact info  Materials Provided: Ensure Coupons, Oncology Milkshake &  Smoothie Recipe Packet, Constipation handout, Increasing Calories and Protein handout and Oncology Nutrition Services Brochure, Ensure Recipe packet, Boost Oatmeal recipe, High calorie vanilla ice cream recipe  -mailed to pts home  Follow Up Plan: prn per pt/wife request, will reach out in the near future if pt/wife do not reach out     Recommend Referral to Other Providers: none at this time

## 2021-07-21 NOTE — PROGRESS NOTES
Spoke to Pearl Toure (wife)  Harlan C/O Urinary frequency and gross hematuria (marroon) with stringy blood clots up to 1 inch long x 3 days  Denies fever  He is currently taking Keflex 500 mg BID until 7/24/21 for positive urine culture  Pearl Toure also stated that Zach Rogesr was to be placed on low dose daily antibiotic for 3 months (per Cristiano Palm note from 7/19/21)  She was instructed that Zach Rogers should start the Keflex 250 mg daily after he completes the BID dosing  Pearl Toure asked for repeat urine culture  Order placed  She was instructed to have Harlan repeat urine culture 7-10 days after completing BID dosing of Keflex  Made her aware that I would contact Urology for her and make them aware of symptoms  Zach Rogers has [facility] and is on [place]

## 2021-07-26 NOTE — SEDATION DOCUMENTATION
Left upper lobe lung biopsy performed by Dr Joe Chester without complications  Tolerated well by patient, VSS throughout  IR Procedure Bedrest Start Time is 1133 x1 hour

## 2021-07-26 NOTE — BRIEF OP NOTE (RAD/CATH)
INTERVENTIONAL RADIOLOGY PROCEDURE NOTE    Date: 7/26/2021    Procedure: IR BIOPSY LEFT LUNG     Preoperative diagnosis:   1  Lung mass         Postoperative diagnosis: Same  Surgeon: Semaj Abad MD     Assistant: None  No qualified resident was available  Blood loss: minimal    Specimens: 4 core biopsies    Findings: Successful CT guided ANNMARIE lung mass biopsy  Complications: None immediate      Anesthesia: conscious sedation

## 2021-07-26 NOTE — DISCHARGE INSTRUCTIONS
Needle Biopsy of the Lung    WHAT YOU NEED TO KNOW:  A needle biopsy of the lung is a procedure to remove cells or tissue from your lung  You may have a fine needle aspiration biopsy (FNAB), or a core needle biopsy (CNB)  A FNAB is used to remove cells through a thin needle  CNB uses a thicker needle to remove lung tissue  The samples are collected and tested for inflammation, infection, or cancer  DISCHARGE INSTRUCTIONS:   Resume your normal diet  Small sips of flat soda will help with nausea  Limit your activity for 24 hours  Wound Care:      - Remove band aid in 24 hours  Contact Interventional Radiology at [phone] [place] PATIENTS: Contact Interventional Radiology at [phone]) ([address]: Contact Interventional Radiology at [phone]) if any of the following occur:    - You have a fever greater than 101*    - You cough up large amounts of bright red blood     - You have chest pain with breathing    - You have shortness of breath    -You have persistent nausea and vomiting    - You have pus, redness or swelling around your biopsy site    - You have questions or concerns about your condition or care           Moderate Sedation   WHAT YOU NEED TO KNOW:   Moderate sedation, or conscious sedation, is medicine used during procedures to help you feel relaxed and calm  You will be awake and able to follow directions without anxiety or pain  You will remember little to none of the procedure  You may feel tired, weak, or unsteady on your feet after you get sedation  You may also have trouble concentrating or short-term memory loss  These symptoms should go away in 24 hours or less  DISCHARGE INSTRUCTIONS:   Call 911 or have someone else call for any of the following:   · You have sudden trouble breathing  · You cannot be woken  Seek care immediately if:   · You have a severe headache or dizziness       · Your heart is beating faster than usual   Contact your healthcare provider if:   · You have a fever  · You have nausea or are vomiting for more than 8 hours after the procedure  · Your skin is itchy, swollen, or you have a rash  · You have questions or concerns about your condition or care  Self-care:   · Have someone stay with you for 24 hours  This person can drive you to errands and help you do things around the house  This person can also watch for problems  · Rest and do quiet activities for 24 hours  Do not exercise, ride a bike, or play sports  Stand up slowly to prevent dizziness and falls  Take short walks around the house with another person  Slowly return to your usual activities the next day  · Do not drive or use dangerous machines or tools for 24 hours  You may injure yourself or others  Examples include a lawnmower, saw, or drill  Do not return to work for 24 hours if you use dangerous machines or tools for work  · Do not make important decisions for 24 hours  For example, do not sign important papers or invest money  · Drink liquids as directed  Liquids help flush the sedation medicine out of your body  Ask how much liquid to drink each day and which liquids are best for you  · Eat small, frequent meals to prevent nausea and vomiting  Start with clear liquids such as juice or broth  If you do not vomit after clear liquids, you can eat your usual foods  · Do not drink alcohol or take medicines that make you drowsy  This includes medicines that help you sleep and anxiety medicines  Ask your healthcare provider if it is safe for you to take pain medicine  Follow up with your healthcare provider as directed: Write down your questions so you remember to ask them during your visits  © 2017 [address]  Information is for End User's use only and may not be sold, redistributed or otherwise used for commercial purposes   All illustrations and images included in CareNotes® are the copyrighted property of HERMELINDA DAVE Vianca  or Vance Borja  The above information is an  only  It is not intended as medical advice for individual conditions or treatments  Talk to your doctor, nurse or pharmacist before following any medical regimen to see if it is safe and effective for you

## 2021-07-29 NOTE — TELEPHONE ENCOUNTER
Patient's wife Angeles Holbrook would like a call back from [address] regarding recent testing results   Please call Angeles Holbrook back at [phone]

## 2021-07-29 NOTE — PROGRESS NOTES
Called pt to see if I can get info from him for as son as the fund for ladder opens & I can get hi yanci help for the [place]   Got pts voicemail left a message for pt to call me back

## 2021-07-30 NOTE — TELEPHONE ENCOUNTER
Spoke to the patient's wife and she requested a sooner appt   Patient scheduled for 8/2 at 1 pm at [facility]

## 2021-08-02 PROBLEM — C34.12 MALIGNANT NEOPLASM OF UPPER LOBE OF LEFT LUNG (HCC): Status: ACTIVE | Noted: 2021-01-01

## 2021-08-02 NOTE — PROGRESS NOTES
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, CT scan at [address] did not show distant metastases      CT scan of the chest showed a mass in the left upper lobe anterior to the chest wall area spiculated highly suspicious for primary lung cancer versus metastatic disease, biopsy showed squamous cell carcinoma HPV negative     The patient is being treated with pembrolizumab 200 mg IV flat dose every 3 weeks, proceed with dose 2  On 08/05/2021, after 3 doses will do CT scan of the chest abdomen and pelvis to assess response, regarding the squamous cell carcinoma of the lung I told the patient and his wife Pembrolizumab or immunotherapy have a good activity against squamous cell carcinoma    If no response we might consider SB RT    I wait for 3 doses and then CT scan    Regarding nutrition he is eating very well he is on prednisone     Urgency, frequency most likely secondary to bladder cancer, follow-up with Urology [facility] is ordered        Labs and imaging studies are reviewed by ordering provider once results are available  If there are findings that need immediate attention, you will be contacted when results available  Discussing results and the implication on your healthcare is best discussed in person at your follow-up visit         HPI:    77-year-old  male with past medical history of coronary artery disease, myocardial infarction, hypertension, hyperlipidemia, lumbar radiculitis, cervical radiculitis, carotid stenosis, abdominal aortic aneurysm who presented with gross hematuria in May 2021 CT scan of the abdomen and pelvis showed bladder thickening with mass in the dome of the bladder status post cystoscopy with abnormal thickening and appearance no evidence of pelvic or distant lymphadenopathy or masses     Biopsy on 06/07/2021 showed malignant neoplasm with tumor necrosis 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 confirmed by 2nd opinion at [facility]     Cystoscopy showed large irregular bladder mass measuring more than 7 cm in the posterior wall towards the left side relatively avascular was not completely resected there are additional synchronous lesions on the right side     CT scan of the chest on 06/30/2021 was not officially read however it reviewed on the PACS system showed a mass in the left upper lung adjacent to the heart border /chest wall area spiculated highly suspicious for primary cancer or metastatic disease    Biopsy of the left upper lobe lung mass showed squamous cell carcinoma HPV negative    The patient was diagnosed with 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 and right middle lobe lung nodule as well bladder cancer with sarcomatoid features    Initiated on Pembrolizumab 200 mg flat dose dose 2  On 08/05/2020     He met with palliative care and consideration for medical marijuana   Interval History:        Previous Treatment:         Test Results:    Imaging: IR biopsy lung    Result Date: 7/28/2021  Narrative: CT-scan guided needle biopsy of left lung mass History: Left lung mass Conscious sedation time: 30 minutes Technique: This examination, like all CT scans performed in the [facility], was performed utilizing techniques to minimize radiation dose exposure, including the use of iterative reconstruction and automated exposure control   The patient was brought to the CT scanner and placed supine on the table  After axial images were obtained through the region of interest an area of the skin was then marked, prepped, and draped in usual sterile fashion  Lidocaine was administered to the  skin and a small skin incision was made  A 17-gauge cannula was advanced up to the lesion, and through this, an 18-gauge core biopsy specimen was obtained  The specimen was adequate The patient tolerated the procedure well and suffered no complications  Impression: Impression: Successful percutaneous core biopsy of left lung mass  A full pathology report will follow  Workstation performed: [id number]       Labs:   Lab Results   Component Value Date    WBC 14 26 (H) 07/26/2021    HGB 10 1 (L) 07/26/2021    HCT 31 6 (L) 07/26/2021    MCV 88 07/26/2021     (H) 07/26/2021     Lab Results   Component Value Date    K 4 3 07/13/2021    CL 99 (L) 07/13/2021    CO2 25 07/13/2021    BUN 27 (H) 07/13/2021    CREATININE 1 11 07/13/2021    GLUF 119 (H) 07/13/2021    CALCIUM 10 1 07/13/2021    CORRECTEDCA 11 2 (H) 07/13/2021    AST 29 07/13/2021    ALT 24 07/13/2021    ALKPHOS 86 07/13/2021    EGFR 61 07/13/2021       No results found for: IRON, TIBC, FERRITIN    No results found for: [id number]      ROS: Review of Systems   Constitutional: Negative  Negative for appetite change, chills, diaphoresis, fatigue, fever and unexpected weight change  HENT:   Negative for hearing loss, lump/mass, mouth sores, nosebleeds, sore throat, trouble swallowing and voice change  Eyes: Negative  Negative for eye problems and icterus  Respiratory: Negative  Negative for chest tightness, cough, hemoptysis and shortness of breath  Cardiovascular: Negative for chest pain and leg swelling  Gastrointestinal: Positive for constipation  Negative for abdominal distention, abdominal pain, blood in stool, diarrhea and nausea  Endocrine: Negative      Genitourinary: Positive for dysuria, frequency and hematuria  Negative for pelvic pain  Musculoskeletal: Negative  Negative for arthralgias, back pain, flank pain, gait problem, myalgias and neck stiffness  Skin: Negative for itching and rash  Neurological: Negative for dizziness, gait problem, headaches, light-headedness, numbness and speech difficulty  Hematological: Negative for adenopathy  Does not bruise/bleed easily  Psychiatric/Behavioral: Negative for confusion, decreased concentration, depression and sleep disturbance  The patient is not nervous/anxious  Current Medications: Reviewed  Allergies: Reviewed  PMH/FH/SH:  Reviewed      Physical Exam:    Body surface area is 1 69 meters squared  Wt Readings from Last 3 Encounters:   08/02/21 67 6 kg (149 lb)   07/26/21 65 3 kg (144 lb)   07/19/21 66 2 kg (146 lb)        Temp Readings from Last 3 Encounters:   08/02/21 (!) 96 7 °F (35 9 °C) (Temporal)   07/26/21 97 9 °F (36 6 °C) (Oral)   07/19/21 (!) 97 3 °F (36 3 °C) (Temporal)        BP Readings from Last 3 Encounters:   08/02/21 122/76   07/26/21 167/76   07/19/21 112/60         Pulse Readings from Last 3 Encounters:   08/02/21 79   07/26/21 74   07/19/21 72        Physical Exam  Vitals reviewed  Constitutional:       General: He is not in acute distress  Appearance: He is well-developed  He is not diaphoretic  Comments: On wheelchair   HENT:      Head: Normocephalic and atraumatic  Eyes:      Conjunctiva/sclera: Conjunctivae normal    Neck:      Trachea: No tracheal deviation  Cardiovascular:      Rate and Rhythm: Normal rate and regular rhythm  Heart sounds: No murmur heard  No friction rub  No gallop  Pulmonary:      Effort: Pulmonary effort is normal  No respiratory distress  Breath sounds: Normal breath sounds  No wheezing or rales  Chest:      Chest wall: No tenderness  Abdominal:      General: There is no distension  Palpations: Abdomen is soft  Tenderness:  There is no abdominal tenderness  Musculoskeletal:      Cervical back: Normal range of motion and neck supple  Lymphadenopathy:      Cervical: No cervical adenopathy  Skin:     General: Skin is warm and dry  Coloration: Skin is not pale  Findings: No erythema  Neurological:      Mental Status: He is alert and oriented to person, place, and time  Psychiatric:         Behavior: Behavior normal          Thought Content: Thought content normal          Judgment: Judgment normal          ECO    Goals and Barriers:  Current Goal: Minimize effects of disease  Barriers: None  Patient's Capacity to Self Care:  Patient is able to self care      Code Status: @[facility]@

## 2021-08-03 NOTE — TELEPHONE ENCOUNTER
I called patient to change his consult time to the pm on 8/9 and he states he is not to have Radiation at this time  When reading the note from yesterday's visit, it looks that way to me as well  Please advise

## 2021-08-04 NOTE — PROGRESS NOTES
Merrill Bashir (wife) called  Jigar Beltran has had gross hematuria (punch color) with stringy clots x 3 days  He had urine culture yesterday  Results still pending  He is currently receiving pembro for bladder cancer  He is scheduled for second infusion 8/6/21  He denies fever  He was instructed to hydrate with water and call Urology if he develops a fever  Hemoglobin from yesterday was 10  He does not have Urology follow up scheduled

## 2021-08-04 NOTE — TELEPHONE ENCOUNTER
Primary palliative medicine provider:   Sofiya Call    Medication requested:  Prednisone   Last filled by Dr Parham Client post [address] 07/13    If for pain, how has the patient been taking their pain medicine?      Last appointment: 07/19  Dr Magdalena Fuentes    Next scheduled appointment:    08/19  Dr Sofiya Call     PDMP review:  na

## 2021-08-04 NOTE — TELEPHONE ENCOUNTER
Letty Taylor calling she left a message if a nurse can call her back regarding Sunita Baires Questions about the steroids and how to taper off and questions about MMJ  She can be reached at [phone]

## 2021-08-05 NOTE — TELEPHONE ENCOUNTER
Spoke to patient's wife, Ericka Richey, regarding her concerns  Advised having hematuria is normal and to hydrate well with water as per AP's recommendations  AP is recommending follow up in 2 months  Appointment has been made for 10/20  Advised urine culture is still not back yet, and once they are, we will notify her if it is +/-  Wife knows to call office with any further concerns

## 2021-08-05 NOTE — TELEPHONE ENCOUNTER
Patient's wife called and states her  did not take his steroid yesturday or today   She is wondering if he is able to start taking a full tablet now since it was missed  Patient's also wondering what the tapering process would be for this patient moving forward   Since patient has not taken this steroid yesturday or today he is a lot more fatigued than normal

## 2021-08-05 NOTE — TELEPHONE ENCOUNTER
Received message from Lucretia Ma, as noted below  Patient recently treated for UTI, and repeat urine culture is pending  Patient seen by oncology on 8/2 and will be starting IV pembrolizumab x3 weeks  He was last seen by Dr Lawrence Syed on 6/22  Please assist with scheduling follow up appointment  Marvel Barnard (wife) called  Nacho Carreno has had gross hematuria (punch color) with stringy clots x 3 days  He had urine culture yesterday  Results still pending  He is currently receiving pembro for bladder cancer  He is scheduled for second infusion 8/6/21  He denies fever  He was instructed to hydrate with water and call Urology if he develops a fever  Hemoglobin from yesterday was 10  He does not have Urology follow up scheduled           Documentation

## 2021-08-05 NOTE — TELEPHONE ENCOUNTER
Spoke to spouse  Instructed to take prednisone 10 mg daily as he has been taking since 07/13  Pt had [facility] office visit 07/19/21  Did not start MMJ yet for appetite  Instructed spouse that weaning off prednisone will be discussed at next office visit or sooner if provider instructs  Spouse reports pt has been doing well with daily prednisone

## 2021-08-06 NOTE — PROGRESS NOTES
Patient completed treatment without adverse effects  AVS given to patient and wife  Discharged home

## 2021-08-06 NOTE — PLAN OF CARE
Problem: SAFETY ADULT  Goal: Patient will remain free of falls  Description: INTERVENTIONS:  - Educate patient/family on patient safety including physical limitations  - Instruct patient to call for assistance with activity   - Consult OT/PT to assist with strengthening/mobility   - Keep Call bell within reach  - Keep bed low and locked with side rails adjusted as appropriate  - Keep care items and personal belongings within reach  - Initiate and maintain comfort rounds  - Make Fall Risk Sign visible to staff  - Offer Toileting every Hour  - Initiate/Maintain   - Obtain necessary fall risk management equipment  - Apply yellow socks and bracelet for high fall risk patients  - Consider moving patient to room near nurses station  8/6/2021 0953 by Marcial Aranda RN  Outcome: Progressing  8/6/2021 [facility] by Marcial Aranda RN  Outcome: Progressing     Problem: Knowledge Deficit  Goal: Patient/family/caregiver demonstrates understanding of disease process, treatment plan, medications, and discharge instructions  Description: Complete learning assessment and assess knowledge base    Interventions:  - Provide teaching at level of understanding  - Provide teaching via preferred learning methods  8/6/2021 0953 by Marcial Aranda RN  Outcome: Progressing  8/6/2021 [facility] by Marcial Aranda, RN  Outcome: Progressing

## 2021-08-06 NOTE — PLAN OF CARE
Problem: SAFETY ADULT  Goal: Patient will remain free of falls  Description: INTERVENTIONS:  - Educate patient/family on patient safety including physical limitations  - Instruct patient to call for assistance with activity   - Consult OT/PT to assist with strengthening/mobility   - Keep Call bell within reach  - Keep bed low and locked with side rails adjusted as appropriate  - Keep care items and personal belongings within reach  - Initiate and maintain comfort rounds  - Make Fall Risk Sign visible to staff  - Offer Toileting every   - Initiate/Maintain    - Obtain necessary fall risk management equipment   - Apply yellow socks and bracelet for high fall risk patients  - Consider moving patient to room near nurses station  Outcome: Progressing     Problem: Knowledge Deficit  Goal: Patient/family/caregiver demonstrates understanding of disease process, treatment plan, medications, and discharge instructions  Description: Complete learning assessment and assess knowledge base    Interventions:  - Provide teaching at level of understanding  - Provide teaching via preferred learning methods  Outcome: Progressing

## 2021-08-09 NOTE — TELEPHONE ENCOUNTER
Spoke to patient  Patient unable to sleep despite taking marijuana  Will send to palliative care Rns  FYI to Dr Sharon Wilder RNs

## 2021-08-09 NOTE — TELEPHONE ENCOUNTER
Patient called to report he is having sleeping issues  Patient is requesting something to help him sleep at night   Patient can be reached at  [phone]

## 2021-08-10 NOTE — TELEPHONE ENCOUNTER
This nurse reviewed with patient's wife the Trazadone medication ordered and how to take it  She will pick that up today  They will also check with [facility] dispensary for direction as to products that may assist patient with sleep

## 2021-08-11 NOTE — TELEPHONE ENCOUNTER
Ivana Hamman to touch base in regards to his nutrition  Spoke to Lalo Hurt who reports that Rebecca Bledsoeivet is doing well at this time and his appetite is improving  She states that she has been making him smoothies and milkshakes from the recipe packet send by this RD in July  She usually combines ice cream and Ensure and he will have this at least 1x daily  She does not have any questions/concerns regarding Harlan's nutrition at this time  Encouraged her or Rebecca Bledsoeivet to reach out with any nutrition related questions/concerns moving forward  Lalo Hurt has RD contact info

## 2021-08-12 NOTE — PROGRESS NOTES
Yaw Sin has MIBC and receiving pembrolizumab  He had recent urine culture on 8/3/21  He called because he did not receive results  He is currently taking Keflex 250 mg daily  He continues to have intermittent hematuria and denies fever  Made him aware I would contact Urology  Please advise

## 2021-08-13 NOTE — TELEPHONE ENCOUNTER
Spoke with patient and wife  Advised of recommendation per STEPHANIE Khanna, to stop Keflex and start 7 day course of Levaquin based on urine culture results  Hallie Mosher, does not recommend patient restart Keflex after completing Levaquin  Advised, they should contact office if symptoms persist after completing course of Levaquin

## 2021-08-13 NOTE — TELEPHONE ENCOUNTER
Please see message from nurse navigator below  Reviewed urine culture from 8/3/2021  Prescription for Levaquin was sent to his pharmacy  Once completed, would hold off on restarting prophylactic Keflex, as one sensitivity did not show susceptibility to cephalosporins  Veto Dennison RN 5 hours ago (3:39 PM)        Zach Sender has MIBC and receiving pembrolizumab  He had recent urine culture on 8/3/21  He called because he did not receive results  He is currently taking Keflex 250 mg daily  He continues to have intermittent hematuria and denies fever  Made him aware I would contact Urology  Please advise            Documentation

## 2021-08-16 NOTE — TELEPHONE ENCOUNTER
RAD ONC-  Spoke with patients wife, patient has Lung CA as second primary - Patient has opted to start [address], per Dr Goodwin Maxim may consider SBRT if he does not have a good response  RAD ONC Consult for XRT to Bladder closed  Department contact information provided   KD

## 2021-08-16 NOTE — PROGRESS NOTES
Called the pt & we talked about the benefits & he asked about the cost of the chemo & I went over the funding part & he sd that he would like the help if I could help him  He gave me all his info & I tried to enroll him on the [employer] portal but there were issues so I called there & spoke to elliott &darcie givens that the pt is   approved for $9400  Effective 05/18/21-08/15/22  grp = Id# [id number]  Bin = [id number]  pcn = [facility]  I asked if there is a 2nd lazara if this exhausts & darcie givens if the fund is still open there is a 2nd lazara for the pt but his award has to be exhausted  Called the pt & spoke to his wife & gave her the info  She sd that she will tell him when he wakes up & if she has any questions she will tell him  To call me  Nona De La Garza

## 2021-08-17 NOTE — TELEPHONE ENCOUNTER
Received a call from patient and his wife  They had questions about information they received in the mail about CARIS testing  I explained this test was ordered last month  Patient will discuss this further with Dr Desiree Salcedo at his upcoming appointment  Patient states he is having trouble sleeping  He does not fall asleep until 3-4am   He is asking if Dr Desiree Slacedo can prescribe something to help him sleep? Patient also discuss that he is not happy with the urologist he is seeing now    He will reach out to that practice to discuss switching to another provider    Will send to RN to discuss sleep medication with Dr Desiree Salcedo   [phone]

## 2021-08-17 NOTE — TELEPHONE ENCOUNTER
Please reschedule follow up with another Urologist per patient request  He is currently receiving pembrolizumab  He is currently scheduled for follow up in October  His follow up should be scheduled as a cysto end of September or early October if possible  He has muscle invasive bladder cancer

## 2021-08-17 NOTE — TELEPHONE ENCOUNTER
Reviewed with Dr Rosario Alfaro and he would like the patient to take melatonin 3 mg daily  If this is ineffective he can try melatonin 6 mg daily  If the   6 mg does not work he should inform the office  I called the patient to review this information  Kacey Rudd can you please address the urologist question?  Thanks

## 2021-08-19 PROBLEM — F41.9 ANXIOUSNESS: Status: ACTIVE | Noted: 2021-01-01

## 2021-08-19 PROBLEM — G47.01 INSOMNIA DUE TO MEDICAL CONDITION: Status: ACTIVE | Noted: 2021-01-01

## 2021-08-19 PROBLEM — R45.89 DYSPHORIC MOOD: Status: ACTIVE | Noted: 2021-01-01

## 2021-08-19 NOTE — PATIENT INSTRUCTIONS
It was a pleasure to see you again today  Thank you for coming in  · Reduce prednisone to 5mg (half of a 10gm tab) daily  Call me in 2 weeks to let me know how your appetite and energy level are  · Start Celexa, 20mg per day, for mood  You had taken this in the past    · Increase trazodone to 100mg at bedtime for sleep  · Please return to the medical marijuana dispensary and ask then about oral products (tinctures, oils) to help w/ insomnia, loss of appetite, nausea; may also help w/ anxiety more than the vaped product  · Use the methocarbamol for muscle spasms (even leg or foot spasms at night), as needed  · Return in about 1 month  · Call us for refills on medications that we supply, as needed  · If something changes and you need to come in sooner, please call our office  PRESCRIPTION REFILL REMINDER:  All medication refills should be requested prior to [facility] on Friday  Any refill requests after noon on Friday would be addressed the following Monday  Please protect yourself from the novel Coronavirus (COVID-19)! The numbers of cases of Coronavirus are spiking in [address]  This is not a more dangerous virus, but a sign that more people in a community are spreading the virus  Please check the local disease reports near you if you consider travelling this summer  We do not advise travel to any community or State with a rising viral caseload   Wash your hands! Soap and water, or hand  with at least 60% alcohol, are both effective at killing the virus   Wear a mask! This will help protect others from any virus particles you might spread  Your mouth and nose BOTH need to be covered   Keep the distance! Keep 6 feet of distance from others people, even if they seem healthy  Keeping distance protects you from the other person's virus spread   Thank you for completing the [address] vaccination series

## 2021-08-19 NOTE — TELEPHONE ENCOUNTER
Lana Kemp 16 hours ago (3:18 PM)   SZ     Patients wife is calling back regarding an appointment            Documentation    Germania Bray [phone]  [facility]

## 2021-08-19 NOTE — PROGRESS NOTES
Follow-up with Palliative and Supportive Care  Albino Cordova 82 y o  male [id number]    ASSESSMENT & PLAN:  1  Malignant neoplasm of urinary bladder, unspecified site ([facility] [address] )    2  Malignant neoplasm of upper lobe of left lung ([facility] [address] )    3  Chronic bilateral low back pain without sciatica    4  Loss of appetite    5  Neoplastic malignant related fatigue    6  Insomnia due to medical condition    7  Anxiousness    8  Dysphoric mood    9  Medical marijuana use    10  Palliative care patient    6  Protein-calorie malnutrition, unspecified severity ([facility] )           Acute neck pain s/t muscle spasm resolved  He does experience nocturnal leg and foot spasms  Continue methocarbamol PRN   Continue Tylenol   Taper prednisone to see if other medications (inclusing MMJ) are helping w/ appetite   Increase trazodone to 100mg QHS for insomnia  May reduce or discontinue in the future if MMJ is helpful   Start Celexa 20mg/day for mood; patient had tolerated 20mg/day in the past (2018)   Patient using a vaped MMJ product but it is challenging for him to use it  Counseled on PO products for anxiousness, appetite, sleep, pain   Patient has completed the [address] vaccination series   Reviewed notes (Urology, Medical Oncology, Gastroenterology), labs (7/26/21: ANNMARIE biopsy showing SCC; 8/3/21: Cr 0 90, alb 2 9, Hb 10 1; alb 2 6 on 7/13/21), imaging (7/26/21 IR biopsy, 6/30/21 CT chest)   Emotional support provided   Medication safety issues addressed - no driving under the influence of narcotics, watch for adverse effects including AMS and respiratory depression, keep medications stored in a safe/locked environment        Requested Prescriptions     Signed Prescriptions Disp Refills    traZODone (DESYREL) 50 mg tablet 60 tablet 0     Sig: Take 2 tablets (100 mg total) by mouth daily at bedtime    predniSONE 10 mg tablet 30 tablet 0     Sig: Take 0 5 tablets (5 mg total) by mouth daily with breakfast    citalopram (CeleXA) 20 mg tablet 30 tablet 0     Sig: Take 1 tablet (20 mg total) by mouth daily       Medications Discontinued During This Encounter   Medication Reason    predniSONE 10 mg tablet Reorder    traZODone (DESYREL) 50 mg tablet Reorder       Representatives have queried the patient's controlled substance dispensing history in the Prescription Drug Monitoring Program in compliance with regulations before I have prescribed any controlled substances  The prescription history is consistent with prescribed therapy and our practice policies  35+ minutes were spent face to face with patient and wife Albertha Lennox with greater than 50% of the time spent in counseling or coordination of care including discussions of symptom assessment and management, medication review and adjustment, psychosocial support, chart review, imaging review, lab review and medical marijuana  All of the patient's questions were answered during this discussion  Return in about 1 month (around 9/19/2021)  SUBJECTIVE:  Chief Complaint   Patient presents with    Follow-up    Cancer    Insomnia    Back Pain    Loss of Appetite    Weakness - Generalized    Counseling        HPI    Julius Rodríguez is a 82 y o  male w/ SCC of the lung (diagnosed 07/2021), muscle-invasive bladder cancer s/p TURBT, chronic non-malignant pain s/t cervical spinal stenosis w/ neurogenic claudication + lumbar radiculitis + lumbar post-laminectomy syndrome + L4-5 herniated nucleus pulposus  He follows w/ Dr Evangelina Bennett (Urology), Dr Christa Kirk (Medical Oncology), [facility] (most recently [place] on 2/4/21), Dr Lore Gaines (Physiatry)  Malignancy diagnosis confirmed by [facility]  Per Urology, patient is not a recommended surgical candidate (but their 6/22/21 note states it is an option for him)  Patient was offered spinal stimulator by Pain Medicine in 02/2021 as TESI had not been of benefit   Local ketorolac injections had been useful for his neck pain in the past  Cancer treatment plan includes pembrolizumab  Patient is [facility] to [facility]; seen by Dr Tricia Rodriguez 7/19/21 for symptom management (including MMJ certification)  Patient reports his appetite has increased in recent weeks and he has regained some weight  He is concerned about long-term steroid use and asks if prednisone will need to be used long term  He reports his neck pain s/t spasm has resolved, but he has some ongoing nocturnal leg and foot spasms (not PLMD/RLS)  Sleep continues to be an issue for him; 50mg trazodone was not helpful, and mirtazapine was discontinued by another provider  Patient endorses dysphoric mood and some anxiousness; he had been on Celexa in the past and felt is had been useful (40mg/day back on 2018)  Patient is currently using a vaped MMJ product ([place]) to help relax, but has not used PO products, or addressed insomnia or anorexia specifically w/ MMJ  Counseled  He states using the vape can be challenging  Patient is frustrated at continued urinary tract infections  PDMP shows no concerns  The following portions of the medical history were reviewed: past medical history, problem list, medication list, and social history      Current Outpatient Medications:     acetaminophen (TYLENOL) 500 mg tablet, Take 2 tablets (1,000 mg total) by mouth 3 (three) times a day, Disp: 180 tablet, Rfl: 2    aspirin 325 mg tablet, Take 325 mg by mouth daily, Disp: , Rfl:     losartan (COZAAR) 100 MG tablet, daily, Disp: , Rfl:     Magnesium Oxide 400 MG CAPS, daily, Disp: , Rfl:     melatonin 3 mg, Take 6 mg by mouth daily at bedtime, Disp: , Rfl:     methocarbamol (ROBAXIN) 500 mg tablet, Take 500 mg by mouth 3 (three) times a day as needed for pain (neck pain, muscle spasms), Disp: , Rfl:     metoprolol tartrate (LOPRESSOR) 50 mg tablet, 2 (two) times a day, Disp: , Rfl:     Omega-3 Fatty Acids (FISH OIL PO), daily, Disp: , Rfl:    polyethylene glycol (MIRALAX) 17 g packet, Take 17 g by mouth daily To prevent constipation  Hold one dose for loose stool , Disp: 30 each, Rfl: 11    pravastatin (PRAVACHOL) 80 mg tablet, daily, Disp: , Rfl:     predniSONE 10 mg tablet, Take 0 5 tablets (5 mg total) by mouth daily with breakfast, Disp: 30 tablet, Rfl: 0    senna (SENOKOT) 8 6 MG tablet, Take 1 tablet (8 6 mg total) by mouth 2 (two) times a day as needed for constipation, Disp: 60 tablet, Rfl: 11    traZODone (DESYREL) 50 mg tablet, Take 2 tablets (100 mg total) by mouth daily at bedtime, Disp: 60 tablet, Rfl: 0    citalopram (CeleXA) 20 mg tablet, Take 1 tablet (20 mg total) by mouth daily, Disp: 30 tablet, Rfl: 0    levofloxacin (LEVAQUIN) 500 mg tablet, Take 1 tablet (500 mg total) by mouth every 24 hours for 7 days (Patient not taking: Reported on 8/19/2021), Disp: 7 tablet, Rfl: 0    niacin 500 mg tablet, daily (Patient not taking: Reported on 8/19/2021), Disp: , Rfl:     nystatin (MYCOSTATIN) powder, Apply topically 2 (two) times a day (Patient not taking: Reported on 8/19/2021), Disp: 15 g, Rfl: 0    Review of Systems   Constitutional: Positive for activity change, appetite change (improved), fatigue and unexpected weight change (reports regaining lost weight)  Eyes: Negative for pain and itching  Endocrine: Negative for polydipsia and polyphagia  Genitourinary: Positive for dysuria  Recurrent UTI  Musculoskeletal: Positive for back pain and gait problem  Negative for neck pain  Skin: Negative for pallor  Tanned  Allergic/Immunologic: Positive for immunocompromised state  Neurological: Positive for weakness  Negative for facial asymmetry  Psychiatric/Behavioral: Positive for dysphoric mood and sleep disturbance  The patient is nervous/anxious            OBJECTIVE:  /70 (BP Location: Left arm, Patient Position: Sitting, Cuff Size: Standard)   Pulse 102   Temp (!) 97 1 °F (36 2 °C) (Temporal) Resp 20   Ht 5' 2" (1 575 m)   Wt 68 kg (150 lb)   SpO2 97%   BMI 27 44 kg/m²   Vital signs reviewed  Physical Exam  Constitutional:       General: He is not in acute distress  Appearance: He is ill-appearing (chronically)  He is not toxic-appearing  Comments: Debilitated  Seen in WC  HENT:      Head: Normocephalic and atraumatic  Right Ear: External ear normal       Left Ear: External ear normal    Eyes:      General: No scleral icterus  Right eye: No discharge  Left eye: No discharge  Extraocular Movements: Extraocular movements intact  Conjunctiva/sclera: Conjunctivae normal       Pupils: Pupils are equal, round, and reactive to light  Pulmonary:      Effort: Pulmonary effort is normal  No respiratory distress  Abdominal:      General: There is no distension  Musculoskeletal:      Right lower leg: No edema  Left lower leg: No edema  Skin:     General: Skin is dry  Coloration: Skin is not pale  Neurological:      General: No focal deficit present  Mental Status: He is alert and oriented to person, place, and time  Motor: Weakness present  Gait: Gait abnormal    Psychiatric:         Attention and Perception: Attention normal          Mood and Affect: Mood and affect normal          Speech: Speech normal          Behavior: Behavior normal  Behavior is cooperative  Thought Content: Thought content normal          Cognition and Memory: Cognition and memory normal          Judgment: Judgment normal           Derian Wisdom MD  [facility]      Portions of this document may have been created using dictation software and as such some "sound alike" terms may have been generated by the system  Do not hesitate to contact me with any questions or clarifications

## 2021-08-20 NOTE — TELEPHONE ENCOUNTER
Returned wife's call  Patient has not yet been to the [facility], they hope to do so Monday  Unsteady on feet / feeling like he might fall after taking 100mg trazodone  Similarly patient's wife unwilling to try BZD s/t fall risk  Had decreased melatonin to 3mg but will resume 6mg  Encouraged trip to [facility] to try "Soothe" or "Doze" for insomnia  Thank you

## 2021-08-20 NOTE — TELEPHONE ENCOUNTER
Wife call again to make sure she gets a call back  I informed her Dr Cuenca Come was made aware and she should be getting a call back soon

## 2021-08-20 NOTE — TELEPHONE ENCOUNTER
DR changed some medications at appt yesterday  To deal with better sleep  Trazodone was increased and he took 1 melatonin  In the middle of the night he woke up unsteady and afraid of falling  She wants to know what to do for tonight? She can be reached at [phone]  She doesn't think he should take again tonight Conley Kussmaul stated she needs a call back today

## 2021-08-20 NOTE — TELEPHONE ENCOUNTER
Paola Schneider  He decided to transfer care to [facility]  He is scheduled with them on 9/2/21

## 2021-08-27 NOTE — PROGRESS NOTES
Notified by infusion pt complaining of ongoing urinary burning, frequency and bleeding  Was on recent course of Levaquin which did not relieve symptoms  Dr Josi Bagley would like to check a UA and okay to proceed with treatment today  Also Na today 128 - Dr Josi Bagley aware

## 2021-08-27 NOTE — PROGRESS NOTES
Patient completed treatment without adverse reaction  AVS provided to patient and discharged home with wife  Explained should possibly receive phone call from Dr Cecile Goldberg office regarding UA results

## 2021-08-27 NOTE — PROGRESS NOTES
Patient here for Keytruda,  Complaints for urine frequency burning and blood in urine  Was on Levaquin from urologist and completed course with not relief  Seeing new urologist next month    Discussed with Albertina Veronica-Dr Poncho MARSH ordered and proceed with treatment as ordered

## 2021-09-03 PROBLEM — D50.0 IRON DEFICIENCY ANEMIA DUE TO CHRONIC BLOOD LOSS: Status: ACTIVE | Noted: 2021-01-01

## 2021-09-03 NOTE — PROGRESS NOTES
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  No evidence of pelvic or distant lymphadenopathy or masses  He has transferred care [facility]  He had an appointment with Miriam Charles PA-c 9/2/21  He is scheduled for cystocopy 9/16/21  Molecular testing on the bladder cancer specimen identified PDL1 10%  2   2 3 x 1 9 cm lobulated nodule in the anterior left upper lobe nodule abutting the mediastinum  Biopsy showed squamous cell carcinoma ANNMARIE,  HPV negative     Additional 1 4 x 1 1 cm smoothly marginated nodule in the medial right middle lobe  Due to PS, comorbid conditions, he is not a good candidate for chemotherapy  Pembrolizumab 200 mg IV flat dose every 3 weeks initiated 7/16/21  This can have activity for lung cancer as well  CT scan of the chest abdomen and pelvis requested to assess response    Regarding the squamous cell carcinoma of the lung, if no response we might consider SBRT  3   Regarding nutrition he is eating very well    he is on prednisone  Albumin improving  4   Urgency, frequency, chronic UTI  He is working with urology  Rx upad    5  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     6  Progressive anemia with thrombocytopenia  Hemoglobin 11 2 with MCV 91 and platelet count 663 2/30/33 8/24/21 Hemoglobin 9 4 with MCV 91, platelet count 686 0/33/17  He has had episodes of hematuria  We discussed IV iron replacement  He is agreeable  Will add Venofer 300mg x 3 doses  History of Presenting Illness:  Mary Purvis is a 72-year-old  male seen initially 7/6/21 regarding poorly differentiated bladder cancer      He has a past medical history of coronary artery disease, myocardial infarction, hypertension, hyperlipidemia, lumbar radiculitis, cervical radiculitis, carotid stenosis, abdominal aortic aneurysm  He  presented with gross hematuria in May 2021  CT scan of the abdomen and pelvis showed bladder thickening with mass in the dome of the bladder  He is status post cystoscopy with abnormal thickening and appearance  No evidence of pelvic or distant lymphadenopathy or masses  Biopsy on 06/07/2021 showed malignant neoplasm with tumor necrosis 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 confirmed by 2nd opinion at [facility]      Cystoscopy showed large irregular bladder mass measuring more than 7 cm in the posterior wall towards the left side relatively avascular  This was not completely resected  There are additional synchronous lesions on the right side  CT scan of the chest on 06/30/2021 identified 2 3 x 1 9 cm lobulated nodule in the anterior left upper lobe abutting the mediastinum and 1 4 x 1 1 cm smoothly marginated nodule in the medial right middle lobe  Two 4 mm lateral basal juxtapleural left lower lobe nodules     He met with palliative care and consideration for medical marijuana      He reported fatigue, weight loss, constipation, hematuria, tingling and numbness denies any headache blurred vision diplopia angina melena, hematochezia, skin rash, night sweats     Heavy smoker used to smoke 1 pack of cigarettes daily for many years  He does not drink alcohol  ANNMARIE lung biopsy 7/26/21 - Squamous cell carcinoma  HPV negative  Pembrolizumab 200 mg IV flat dose every 3 weeks initiated 7/16/21    after 3 doses will do CT scan of the chest abdomen and pelvis    Interval History:     Transferred care to [facility]  Was prescribed Flomax  Persistent frequency and nocturia  Hematuria has improved  Last day of antibiotics today    Was given instructions to contact Urology should he have any increased urinary symptoms which may be indicative of UTI  Chronic fatigue which preceded initiation of immunotherapy  Good appetite  He is sleeping better with medical marijuana  denies any respiratory issues  Moving his bowels well  Test Results:        Labs:   Lab Results   Component Value Date    HGB 9 4 (L) 08/24/2021    HCT 30 1 (L) 08/24/2021    MCV 91 08/24/2021     08/24/2021    WBC 9 96 08/24/2021    NRBC 0 08/24/2021     Lab Results   Component Value Date    K 4 1 08/24/2021    CL 98 (L) 08/24/2021    CO2 25 08/24/2021    BUN 21 08/24/2021    CREATININE 0 80 08/24/2021    GLUF 112 (H) 08/24/2021    CALCIUM 9 2 08/24/2021    CORRECTEDCA 10 0 08/24/2021    AST 22 08/24/2021    ALT 24 08/24/2021    ALKPHOS 74 08/24/2021    EGFR 82 08/24/2021       Imaging: No results found  ROS:  As mentioned in HPI & Interval History otherwise 14 point ROS negative  Allergies: Allergies   Allergen Reactions    Magnesium Chloride Diarrhea    Oxycodone-Acetaminophen Diarrhea     Current Medications: Reviewed  PMH/FH/SH:  Reviewed      Physical Exam:    1 68 meters squared    Ht Readings from Last 3 Encounters:   09/03/21 5' 2" (1 575 m)   08/19/21 5' 2" (1 575 m)   08/02/21 5' 2" (1 575 m)        Wt Readings from Last 3 Encounters:   09/03/21 67 4 kg (148 lb 8 oz)   08/27/21 68 1 kg (150 lb 2 1 oz)   08/19/21 68 kg (150 lb)        Temp Readings from Last 3 Encounters:   08/27/21 97 8 °F (36 6 °C) (Tympanic)   08/19/21 (!) 97 1 °F (36 2 °C) (Temporal)   08/06/21 97 7 °F (36 5 °C) (Temporal)        BP Readings from Last 3 Encounters:   09/03/21 112/62   08/27/21 153/64   08/19/21 138/70           Physical Exam  Vitals reviewed  Constitutional:       General: He is not in acute distress  Appearance: He is well-developed  He is not diaphoretic  HENT:      Head: Normocephalic and atraumatic     Eyes:      Conjunctiva/sclera: Conjunctivae normal    Neck:      Trachea: No tracheal deviation  Cardiovascular:      Rate and Rhythm: Normal rate and regular rhythm  Heart sounds: No murmur heard  No friction rub  No gallop  Pulmonary:      Effort: Pulmonary effort is normal  No respiratory distress  Breath sounds: Normal breath sounds  No wheezing or rales  Chest:      Chest wall: No tenderness  Abdominal:      General: There is no distension  Palpations: Abdomen is soft  Tenderness: There is no abdominal tenderness  Musculoskeletal:      Cervical back: Normal range of motion and neck supple  Comments: wheelchair   Lymphadenopathy:      Cervical: No cervical adenopathy  Skin:     General: Skin is warm and dry  Coloration: Skin is not pale  Findings: No erythema  Neurological:      Mental Status: He is alert and oriented to person, place, and time  Psychiatric:         Behavior: Behavior normal          Thought Content:  Thought content normal          Judgment: Judgment normal          Emergency Contacts:    Extended Emergency Contact Information  Primary Emergency Contact: Ellene Nageotte [address] Phone: [phone]  Mobile Phone: [phone]  Relation: Spouse

## 2021-09-17 NOTE — PROGRESS NOTES
Per dannie Mcknight to offer 9/30/21 at 1:15 in the [place] office   Spoke with wife, who confirmed appointment date, time and location

## 2021-09-17 NOTE — PROGRESS NOTES
Angeles Jesusito (wife) called  Sam De La Torre had transferred care to [address] [place] [address] [address] because they were unhappy with [facility]  They realized that it is difficult for Sam De La Torre to receive treatment/ have follow up in different health care systems  Per Angeles Holbrook, he had cysto yesterday with [facility]  He is currently receiving cycle 4 of pembrolizumab for MIBC and is scheduled for CT c/a/p 9/21/21  Angeles Holbrook is requesting to transfer care to Dr Kianna Oneil and he would like visits in the [place] office  Made her aware that I would request visit with Dr Kianna Oneil

## 2021-09-20 NOTE — TELEPHONE ENCOUNTER
Called and left a message explaining that Dr Piter Khalil would not be in the office on 10/1/21  He was moved to [employer] schedule for later in the morning that same day  I left the [place] line number incase he as any questions

## 2021-09-21 NOTE — PROGRESS NOTES
Luisjesus Hurleyn (wife) L/M wanting to know if Kathleen should have his urine culture done at [address]  It was ordered by his Urologist at [address]  Called Germania rodriguez and L/M on both home and cell  Made her aware that if he has a copy of the order, he could have it done at [address] and it should be done about 4 days prior to his visit with Dr Bj Cortez  Instructed her to call me back if she has additional questions

## 2021-09-21 NOTE — PROGRESS NOTES
Barney Dorsey called back  She stated that Yaw Sin completed antibiotics last week for UTI and he is scheduled for transition of care visit with Dr Danika Suero 9/30/21  Yaw Sin was instructed by [facility] to have repeat UA and culture after completing antibiotics  Yaw Sin does not have an order and would like to have it done at [address] [place] [address]  Order for UA and culture placed  Yaw Sin will have it done at [facility] tomorrow

## 2021-09-22 NOTE — PATIENT INSTRUCTIONS
It was a pleasure to see you again today  Thank you for coming in  · Take 0 5 tablet (5mg) of the prednisone every other day for 4 doses (8 days) then discontinue  · I recommend returning to the medical marijuana [facility]) dispensary to ask for a medication you can take by mouth prior to mealtimes, to increase appetite  Continue the "Doze" for insomnia  · Continue Prozac, but at 40mg/day - please discuss ways to keep your sodium levels normal with your primary care doctor  · Return in about 1 month  · Call us for refills on medications that we supply, as needed  · If something changes and you need to come in sooner, please call our office  PRESCRIPTION REFILL REMINDER:  All medication refills should be requested prior to [facility] on Friday  Any refill requests after noon on Friday would be addressed the following Monday

## 2021-09-22 NOTE — PROGRESS NOTES
Follow-up with Palliative and Supportive Care  Matilde Hill 82 y o  male [id number]    ASSESSMENT & PLAN:  1  Malignant neoplasm of upper lobe of left lung ([facility] [address] )    2  Malignant neoplasm of urinary bladder, unspecified site ([facility] [address] )    3  Chronic bilateral low back pain without sciatica    4  Loss of appetite    5  Neoplastic malignant related fatigue    6  Insomnia due to medical condition    7  Medical marijuana use    8  Palliative care patient    9  Anxiousness    10  Dysphoric mood    11  Protein-calorie malnutrition, unspecified severity (HCC)           Continue methocarbamol PRN  Effective   Continue Tylenol   Complete prednisone taper and discontinue after 4 more doses   Patient feels MMJ product (PO "Doze") is helpful for insomnia   Return to 40mg/day Celexa for depressed mood  He had tolerated this dose in the past  Counseled that patient should touch base w/ his PCP re: hyponatremia   Counseled on PO and topical MMJ products for anxiousness, appetite, pain   Patient has received [address] vaccinations   Reviewed notes ([address] [facility]), labs (9/14/21: Cr 0 93, alb 2 6, Hb 7 4, TSH 1 500), imaging + procedures (9/16/21 uroflowmetry)   Emotional support provided   Medication safety issues addressed - no driving under the influence of narcotics, watch for adverse effects including AMS and respiratory depression, keep medications stored in a safe/locked environment  Requested Prescriptions     Signed Prescriptions Disp Refills    citalopram (CeleXA) 40 mg tablet 30 tablet 2     Sig: Take 1 tablet (40 mg total) by mouth daily    predniSONE 10 mg tablet 30 tablet 0     Sig: Take 0 5tablet (5mg) every other day for 4 doses (8 days) then discontinue         Medications Discontinued During This Encounter   Medication Reason    predniSONE 10 mg tablet Reorder    citalopram (CeleXA) 20 mg tablet Reorder       Representatives have queried the patient's controlled substance dispensing history in the Prescription Drug Monitoring Program in compliance with regulations before I have prescribed any controlled substances  The prescription history is consistent with prescribed therapy and our practice policies  30+ minutes were spent face to face with patient and wife Marvel Barnard with greater than 50% of the time spent in counseling or coordination of care including discussions of symptom assessment and management, medication review and adjustment, psychosocial support, chart review, imaging review, lab review and medical marijuana  All of the patient's questions were answered during this discussion  Return in about 1 month (around 10/22/2021)  SUBJECTIVE:  Chief Complaint   Patient presents with    Follow-up    Loss of Appetite    Cancer    Insomnia    Anorexia    Depression    Counseling    Medication Refill        HPI    Carlton Phillips is a 82 y o  male w/ SCC of the lung (diagnosed 07/2021), muscle-invasive bladder cancer s/p TURBT, chronic non-malignant pain s/t cervical spinal stenosis w/ neurogenic claudication + lumbar radiculitis + lumbar post-laminectomy syndrome + L4-5 herniated nucleus pulposus  He follows w/ Dr Jose Ceja (Urology), Dr Piedad Bell (Medical Oncology), [facility] (most recently Alessio Caban on 2/4/21), Dr Octavio Campos (Physiatry)  Malignancy diagnosis confirmed by [facility]  Per Urology, patient is not a recommended surgical candidate (but their 6/22/21 note states it is an option for him)  Patient was offered spinal stimulator by Pain Medicine in 02/2021 as TESI had not been of benefit  Local ketorolac injections had been useful for his neck pain in the past  Cancer treatment plan includes pembrolizumab (last infusion 9/17/21)  Patient is known to [facility] [place], operated by [facility]; seen 8/19/21 for symptom management, psychosocial support  Visit today for same   Per 9/16/21 [facility] note, they recommended patient transition all care to one network [facility] or [facility]) for better care coordination  Patient plans to continue care w/ CHARLIE TRIANA; and will transition Urology care to Dr Van Dorsey  Low appetite continues to be an issue for the patient, though he has been defending his weight  He is using an oral MMJ product ("Doze") which helps w/ sleep, but nothing that is helping w/ appetite (yet)  He accepts counseling that it can take some trial and error to find products that work for him  Thankfully his wife Cristofer Nuñez has now received her MMJ caregiver card  He endorses depressed mood and requests a return to 40mg/day Prozac (he had been on this dose long-term in the past)  Patient has been tolerating prednisone taper and is willing to complete the taper then discontinue  Patient still frustrated at ongoing recurrent UTIs; he will discuss in his upcoming visit w/ Dr Van Dorsey  Unknown if there might be a colovesicular fistula or some other cause  PDMP shows no concerns  The following portions of the medical history were reviewed: past medical history, problem list, medication list, and social history      Current Outpatient Medications:     acetaminophen (TYLENOL) 500 mg tablet, Take 2 tablets (1,000 mg total) by mouth 3 (three) times a day, Disp: 180 tablet, Rfl: 2    aspirin 325 mg tablet, Take 325 mg by mouth daily, Disp: , Rfl:     citalopram (CeleXA) 40 mg tablet, Take 1 tablet (40 mg total) by mouth daily, Disp: 30 tablet, Rfl: 2    losartan (COZAAR) 100 MG tablet, daily, Disp: , Rfl:     melatonin 3 mg, Take 6 mg by mouth daily at bedtime, Disp: , Rfl:     methocarbamol (ROBAXIN) 500 mg tablet, Take 500 mg by mouth 3 (three) times a day as needed for pain (neck pain, muscle spasms), Disp: , Rfl:     metoprolol tartrate (LOPRESSOR) 50 mg tablet, 2 (two) times a day, Disp: , Rfl:     NON FORMULARY, [facility] [place], Disp: , Rfl:     Omega-3 Fatty Acids (FISH OIL PO), daily, Disp: , Rfl:     pravastatin (PRAVACHOL) 80 mg tablet, daily, Disp: , Rfl:     predniSONE 10 mg tablet, Take 0 5tablet (5mg) every other day for 4 doses (8 days) then discontinue , Disp: 30 tablet, Rfl: 0    senna (SENOKOT) 8 6 MG tablet, Take 1 tablet (8 6 mg total) by mouth 2 (two) times a day as needed for constipation, Disp: 60 tablet, Rfl: 11    tamsulosin (Flomax) 0 4 mg, Take 0 4 mg by mouth daily with dinner, Disp: , Rfl:     Magnesium Oxide 400 MG CAPS, daily (Patient not taking: Reported on 9/22/2021), Disp: , Rfl:     niacin 500 mg tablet, daily (Patient not taking: Reported on 8/19/2021), Disp: , Rfl:     nystatin (MYCOSTATIN) powder, Apply topically 2 (two) times a day (Patient not taking: Reported on 8/19/2021), Disp: 15 g, Rfl: 0    polyethylene glycol (MIRALAX) 17 g packet, Take 17 g by mouth daily To prevent constipation  Hold one dose for loose stool  (Patient not taking: Reported on 9/22/2021), Disp: 30 each, Rfl: 11  No current facility-administered medications for this visit  Review of Systems   Constitutional: Positive for activity change, appetite change and fatigue  Negative for unexpected weight change (defending his weight)  Eyes: Negative for pain and itching  Gastrointestinal: Negative for constipation, diarrhea, nausea and vomiting  Endocrine: Negative for polydipsia and polyphagia  Genitourinary: Positive for dysuria  Recurrent UTI  Musculoskeletal: Positive for back pain and gait problem  Skin: Negative for pallor  Tanned  Allergic/Immunologic: Positive for immunocompromised state  Neurological: Positive for weakness  Negative for facial asymmetry  Psychiatric/Behavioral: Positive for dysphoric mood and sleep disturbance  The patient is nervous/anxious            OBJECTIVE:  /58 (BP Location: Left arm, Patient Position: Sitting, Cuff Size: Standard)   Pulse 89   Temp (!) 97 4 °F (36 3 °C) (Temporal)   Resp 18   Ht 5' 2" (1 575 m)   Wt 67 8 kg (149 lb 8 oz) SpO2 97%   BMI 27 34 kg/m²   Vital signs reviewed  Physical Exam  Vitals reviewed  Constitutional:       General: He is not in acute distress  Appearance: He is normal weight  He is ill-appearing (chronically)  He is not toxic-appearing  Comments: Debilitated  HENT:      Head: Normocephalic and atraumatic  Right Ear: External ear normal       Left Ear: External ear normal    Eyes:      General: No scleral icterus  Right eye: No discharge  Left eye: No discharge  Extraocular Movements: Extraocular movements intact  Conjunctiva/sclera: Conjunctivae normal       Pupils: Pupils are equal, round, and reactive to light  Pulmonary:      Effort: Pulmonary effort is normal  No respiratory distress  Abdominal:      General: There is no distension  Tenderness: There is no guarding  Musculoskeletal:      Right lower leg: No edema  Left lower leg: No edema  Skin:     General: Skin is dry  Coloration: Skin is not pale  Neurological:      General: No focal deficit present  Mental Status: He is alert and oriented to person, place, and time  Gait: Gait abnormal (seen in WC)  Psychiatric:         Attention and Perception: Attention normal          Mood and Affect: Affect normal  Mood is depressed  Speech: Speech normal          Behavior: Behavior normal  Behavior is cooperative  Thought Content: Thought content normal          Cognition and Memory: Cognition normal          Judgment: Judgment normal           Jonah Adan MD  [facility]  [phone]    Portions of this document may have been created using dictation software and as such some "sound alike" terms may have been generated by the system  Do not hesitate to contact me with any questions or clarifications

## 2021-09-24 NOTE — TELEPHONE ENCOUNTER
Patient's wife Naren Campos called requesting a antibiotic for a patient's UTI  Naren Campos can be reached at [phone]   Naren Campos is requesting a call back from [place]

## 2021-09-24 NOTE — TELEPHONE ENCOUNTER
Elton Pantoja RN urology navigator  Called the patient's wife and informed her that the culture came back inconclusive   He should have another culture completed and discuss this at his urology appt on 9/30

## 2021-09-24 NOTE — TELEPHONE ENCOUNTER
Lalo Hurt called asking when we call regarding the antibiotic to call her on the cell # [phone]

## 2021-09-26 PROBLEM — N30.00 ACUTE CYSTITIS WITHOUT HEMATURIA: Status: ACTIVE | Noted: 2021-01-01

## 2021-09-29 PROBLEM — N17.9 AKI (ACUTE KIDNEY INJURY) (HCC): Status: ACTIVE | Noted: 2021-01-01

## 2021-09-29 PROBLEM — E87.1 HYPONATREMIA: Status: ACTIVE | Noted: 2021-01-01

## 2021-09-29 PROBLEM — N39.0 RECURRENT URINARY TRACT INFECTION: Chronic | Status: ACTIVE | Noted: 2019-08-21

## 2021-09-29 NOTE — TELEPHONE ENCOUNTER
Patient wife Angeles Holbrook called to report the patient suffered a fall last night, stated that  their visiting nurse from [facility] stopped and believes the [address] may be the problem  came to Best call back [phone]

## 2021-09-29 NOTE — TELEPHONE ENCOUNTER
Reviewed with Franca Sánchez PA-C and will offer the patient a blood transfusion for symptomatic anemia  Called the patient's wife and she states that he might be too weak to go get a type and screen and then the infusion center  Educated her that he might need to go back to the ED if he is that weak  She will discuss with the patient and see what he would like to do  Patient was not happy with going to the hospital recently because of having to stay in the ED once admitted  I explained that unfortunately this is the situation right now with the ED but at least he will be receiving the medical care he needs  Patient's wife verbalized understanding and will call back with her decision

## 2021-09-30 PROBLEM — N18.2 CKD (CHRONIC KIDNEY DISEASE), STAGE II: Status: ACTIVE | Noted: 2021-01-01

## 2021-09-30 NOTE — TELEPHONE ENCOUNTER
Patient scheduled for Dr Edilson Han today is current INPT as per Deborah AB  Please assist in r/s after discharge  Thank you!

## 2021-09-30 NOTE — TELEPHONE ENCOUNTER
Michaela Calabrese yes, known for aggressive sarcomatoid urothelial cancer, on immunotherapy per medical-oncology  Our service was contacted for in hospital urologic consultation  He was seen  Reviewed data  CT scan was not demonstrative of any acute changes   symptoms were well controlled  Patient will require medical optimization and management of failure to thrive and hyponatremia  Nothing for us to do  Please reschedule with FT  Thank you

## 2021-09-30 NOTE — PROGRESS NOTES
Veena Sumner called Bertha Vargas is currently in the hospital and she stated that he will likely be discharged  They are planning to keep follow up as scheduled with Dottie Lo PA-C tomorrow as long as he is discharged  Called her back  L/M [phone]  Made her aware that I would make Dottie Lo PA-C and her team aware  I also told her that Urology is planning to reschedule his visit with Dr Juan Torres and will call her once he is discharged per Urology encounter

## 2021-12-01 ENCOUNTER — DOCUMENTATION (OUTPATIENT)
Dept: HEMATOLOGY ONCOLOGY | Facility: CLINIC | Age: 86
End: 2021-12-01

## 2021-12-01 NOTE — PROGRESS NOTES
The following were uploaded to the [facility] portal for payment  Acct# [de-identified] [facility] 07/16/21 $[id number]  Acct# [de-identified] [facility] 08/16/21 $[id number]  Acct# [de-identified] dos 08/27/21 $[id number]

## 2021-12-15 NOTE — PROGRESS NOTES
Dusty Rhoades called  Maureen Magallanes is scheduled with Dr Rishi Oseguera tomorrow for transition of care for his bladder cancer  Wife had left a message that Maureen Magallanes was extremely weak  She did call Medical Oncology and per note from [facility], he was offered blood transfusion but wife stated that he was too weak to go to infusion center  Maureen Magallanes is currently in ER 
Patient remains hospitalized at this time  Will follow up after discharge 
Heterosexual

## 2021-12-30 ENCOUNTER — DOCUMENTATION (OUTPATIENT)
Dept: HEMATOLOGY ONCOLOGY | Facility: CLINIC | Age: 86
End: 2021-12-30

## 2021-12-30 NOTE — PROGRESS NOTES
12/28/21 Per juma at [facility] no claims were recvd  refaxed the following for payment  Acct# [de-identified] [facility] 07/16/21 $[id number]  Acct# [de-identified] dos 08/06/21 $[id number]  Acct# [de-identified] dos 08/27/21 $[id number] 60

## (undated) DEVICE — PACK TUR

## (undated) DEVICE — BAG URINE DRAINAGE 2000ML ANTI RFLX LF

## (undated) DEVICE — BAG DECANTER

## (undated) DEVICE — GLOVE INDICATOR PI UNDERGLOVE SZ 7 BLUE

## (undated) DEVICE — TELFA NON-ADHERENT ABSORBENT DRESSING: Brand: TELFA

## (undated) DEVICE — CATH FOLEY 20FR 5ML 2 WAY SILICONE ELASTIMER

## (undated) DEVICE — SCD SEQUENTIAL COMPRESSION COMFORT SLEEVE MEDIUM KNEE LENGTH: Brand: KENDALL SCD

## (undated) DEVICE — Device: Brand: OLYMPUS

## (undated) DEVICE — GLOVE INDICATOR PI UNDERGLOVE SZ 8 BLUE

## (undated) DEVICE — UROCATCH BAG

## (undated) DEVICE — TUBING SUCTION 5MM X 12 FT

## (undated) DEVICE — EVACUATOR BLADDER ELLIK DISP STRL

## (undated) DEVICE — BASIC SINGLE BASIN-LF: Brand: MEDLINE INDUSTRIES, INC.

## (undated) DEVICE — GLOVE SRG BIOGEL 7.5

## (undated) DEVICE — GLOVE SRG BIOGEL 6.5